# Patient Record
Sex: MALE | Race: BLACK OR AFRICAN AMERICAN | Employment: OTHER | URBAN - METROPOLITAN AREA
[De-identification: names, ages, dates, MRNs, and addresses within clinical notes are randomized per-mention and may not be internally consistent; named-entity substitution may affect disease eponyms.]

---

## 2017-06-13 ENCOUNTER — OFFICE VISIT (OUTPATIENT)
Dept: ENT CLINIC | Age: 57
End: 2017-06-13

## 2017-06-13 VITALS
WEIGHT: 207 LBS | SYSTOLIC BLOOD PRESSURE: 153 MMHG | BODY MASS INDEX: 27.43 KG/M2 | HEART RATE: 66 BPM | DIASTOLIC BLOOD PRESSURE: 86 MMHG | HEIGHT: 73 IN

## 2017-06-13 DIAGNOSIS — J04.0 ACUTE LARYNGITIS: Primary | ICD-10-CM

## 2017-06-13 PROCEDURE — 1036F TOBACCO NON-USER: CPT | Performed by: OTOLARYNGOLOGY

## 2017-06-13 PROCEDURE — 3017F COLORECTAL CA SCREEN DOC REV: CPT | Performed by: OTOLARYNGOLOGY

## 2017-06-13 PROCEDURE — G8419 CALC BMI OUT NRM PARAM NOF/U: HCPCS | Performed by: OTOLARYNGOLOGY

## 2017-06-13 PROCEDURE — 96372 THER/PROPH/DIAG INJ SC/IM: CPT | Performed by: OTOLARYNGOLOGY

## 2017-06-13 PROCEDURE — G8427 DOCREV CUR MEDS BY ELIG CLIN: HCPCS | Performed by: OTOLARYNGOLOGY

## 2017-06-13 PROCEDURE — 99213 OFFICE O/P EST LOW 20 MIN: CPT | Performed by: OTOLARYNGOLOGY

## 2017-06-13 RX ORDER — VENLAFAXINE HYDROCHLORIDE 150 MG/1
CAPSULE, EXTENDED RELEASE ORAL DAILY
COMMUNITY
Start: 2017-04-26

## 2017-06-13 RX ORDER — ACETAMINOPHEN ER 650 MG TABLET,EXTENDED RELEASE 650 MG
650 TABLET, EXTENDED RELEASE ORAL EVERY 8 HOURS PRN
COMMUNITY

## 2017-06-13 RX ORDER — METHYLPREDNISOLONE ACETATE 40 MG/ML
40 INJECTION, SUSPENSION INTRA-ARTICULAR; INTRALESIONAL; INTRAMUSCULAR; SOFT TISSUE ONCE
Status: COMPLETED | OUTPATIENT
Start: 2017-06-13 | End: 2017-06-13

## 2017-06-13 RX ORDER — TOLTERODINE 4 MG/1
CAPSULE, EXTENDED RELEASE ORAL
COMMUNITY
Start: 2017-04-04 | End: 2019-12-12 | Stop reason: ALTCHOICE

## 2017-06-13 RX ORDER — LINACLOTIDE 290 UG/1
CAPSULE, GELATIN COATED ORAL
COMMUNITY
Start: 2017-05-23

## 2017-06-13 RX ADMIN — METHYLPREDNISOLONE ACETATE 40 MG: 40 INJECTION, SUSPENSION INTRA-ARTICULAR; INTRALESIONAL; INTRAMUSCULAR; SOFT TISSUE at 16:07

## 2019-02-20 ENCOUNTER — OFFICE VISIT (OUTPATIENT)
Dept: ENT CLINIC | Age: 59
End: 2019-02-20
Payer: MEDICARE

## 2019-02-20 ENCOUNTER — PROCEDURE VISIT (OUTPATIENT)
Dept: AUDIOLOGY | Age: 59
End: 2019-02-20
Payer: MEDICARE

## 2019-02-20 VITALS
SYSTOLIC BLOOD PRESSURE: 126 MMHG | DIASTOLIC BLOOD PRESSURE: 74 MMHG | HEIGHT: 73 IN | WEIGHT: 197 LBS | BODY MASS INDEX: 26.11 KG/M2

## 2019-02-20 DIAGNOSIS — H90.3 SENSORINEURAL HEARING LOSS (SNHL) OF BOTH EARS: Primary | ICD-10-CM

## 2019-02-20 DIAGNOSIS — H93.13 TINNITUS OF BOTH EARS: Primary | ICD-10-CM

## 2019-02-20 PROCEDURE — G8427 DOCREV CUR MEDS BY ELIG CLIN: HCPCS | Performed by: OTOLARYNGOLOGY

## 2019-02-20 PROCEDURE — 92552 PURE TONE AUDIOMETRY AIR: CPT | Performed by: AUDIOLOGIST

## 2019-02-20 PROCEDURE — 1036F TOBACCO NON-USER: CPT | Performed by: OTOLARYNGOLOGY

## 2019-02-20 PROCEDURE — 92556 SPEECH AUDIOMETRY COMPLETE: CPT | Performed by: AUDIOLOGIST

## 2019-02-20 PROCEDURE — G8419 CALC BMI OUT NRM PARAM NOF/U: HCPCS | Performed by: OTOLARYNGOLOGY

## 2019-02-20 PROCEDURE — 3017F COLORECTAL CA SCREEN DOC REV: CPT | Performed by: OTOLARYNGOLOGY

## 2019-02-20 PROCEDURE — G8484 FLU IMMUNIZE NO ADMIN: HCPCS | Performed by: OTOLARYNGOLOGY

## 2019-02-20 PROCEDURE — 99212 OFFICE O/P EST SF 10 MIN: CPT | Performed by: OTOLARYNGOLOGY

## 2019-02-20 RX ORDER — POTASSIUM CHLORIDE 14.9 MG/ML
20 INJECTION INTRAVENOUS ONCE
COMMUNITY
End: 2019-12-12 | Stop reason: ALTCHOICE

## 2019-02-20 RX ORDER — TRIAMTERENE AND HYDROCHLOROTHIAZIDE 37.5; 25 MG/1; MG/1
1 TABLET ORAL DAILY
COMMUNITY
End: 2019-12-12 | Stop reason: ALTCHOICE

## 2019-02-20 RX ORDER — PIOGLITAZONEHYDROCHLORIDE 15 MG/1
15 TABLET ORAL DAILY
COMMUNITY
End: 2019-12-12 | Stop reason: ALTCHOICE

## 2019-02-20 RX ORDER — PRAVASTATIN SODIUM 20 MG
20 TABLET ORAL DAILY
COMMUNITY

## 2019-12-06 NOTE — PROGRESS NOTES
during my hospitalization, the order may or may not be in effect during this procedure. I give my doctor permission to give me blood or blood products. I understand that there are risks with receiving blood such as hepatitis, AIDS, fever, or allergic reaction. I acknowledge that the risks, benefits, and alternatives of this treatment have been explained to me and that no express or implied warranty has been given by the hospital, any blood bank, or any person or entity as to the blood or blood components transfused. At the discretion of my doctor, I agree to allow observers, equipment/product representatives and allow photographing, and/or televising of the procedure, provided my name or identity is maintained confidentially. I agree the hospital may dispose of or use for scientific or educational purposes any tissue, fluid, or body parts which may be removed.     ________________________________Date________Time______ am/pm  ([facility])  Patient or Signature of Closest Relative or Legal Guardian    ________________________________Date________Time______am/pm      Page 1 of  1  Witness

## 2019-12-12 ENCOUNTER — HOSPITAL ENCOUNTER (OUTPATIENT)
Dept: PREADMISSION TESTING | Age: 59
Discharge: HOME | End: 2019-12-16
Payer: COMMERCIAL

## 2019-12-12 VITALS
OXYGEN SATURATION: 96 % | TEMPERATURE: 97 F | SYSTOLIC BLOOD PRESSURE: 134 MMHG | DIASTOLIC BLOOD PRESSURE: 77 MMHG | WEIGHT: 213 LBS | BODY MASS INDEX: 28.85 KG/M2 | RESPIRATION RATE: 16 BRPM | HEIGHT: 72 IN | HEART RATE: 76 BPM

## 2019-12-12 LAB
ABO/RH: NORMAL
ANION GAP SERPL CALCULATED.3IONS-SCNC: 13 MMOL/L (ref 3–16)
ANTIBODY SCREEN: NORMAL
APTT: 34.3 SEC (ref 24.2–36.2)
BILIRUBIN URINE: NEGATIVE
BLOOD, URINE: ABNORMAL
BUN BLDV-MCNC: 14 MG/DL (ref 7–20)
C-REACTIVE PROTEIN: 7.5 MG/L (ref 0–5.1)
CALCIUM SERPL-MCNC: 9.5 MG/DL (ref 8.3–10.6)
CHLORIDE BLD-SCNC: 101 MMOL/L (ref 99–110)
CLARITY: CLEAR
CO2: 28 MMOL/L (ref 21–32)
COLOR: YELLOW
CREAT SERPL-MCNC: 0.8 MG/DL (ref 0.8–1.3)
EPITHELIAL CELLS, UA: ABNORMAL /HPF
GFR AFRICAN AMERICAN: >60
GFR NON-AFRICAN AMERICAN: >60
GLUCOSE BLD-MCNC: 91 MG/DL (ref 70–99)
GLUCOSE URINE: NEGATIVE MG/DL
HCT VFR BLD CALC: 41.7 % (ref 40.5–52.5)
HEMOGLOBIN: 13.9 G/DL (ref 13.5–17.5)
INR BLD: 1.06 (ref 0.86–1.14)
KETONES, URINE: ABNORMAL MG/DL
LEUKOCYTE ESTERASE, URINE: NEGATIVE
MCH RBC QN AUTO: 31.6 PG (ref 26–34)
MCHC RBC AUTO-ENTMCNC: 33.4 G/DL (ref 31–36)
MCV RBC AUTO: 94.8 FL (ref 80–100)
MICROSCOPIC EXAMINATION: YES
MUCUS: ABNORMAL /LPF
NITRITE, URINE: NEGATIVE
PDW BLD-RTO: 14.6 % (ref 12.4–15.4)
PH UA: 6 (ref 5–8)
PLATELET # BLD: 215 K/UL (ref 135–450)
PMV BLD AUTO: 9.1 FL (ref 5–10.5)
POTASSIUM SERPL-SCNC: 3.6 MMOL/L (ref 3.5–5.1)
PREALBUMIN: 26.8 MG/DL (ref 20–40)
PROTEIN UA: ABNORMAL MG/DL
PROTHROMBIN TIME: 12.3 SEC (ref 10–13.2)
RBC # BLD: 4.4 M/UL (ref 4.2–5.9)
RBC UA: ABNORMAL /HPF (ref 0–2)
SEDIMENTATION RATE, ERYTHROCYTE: 28 MM/HR (ref 0–20)
SODIUM BLD-SCNC: 142 MMOL/L (ref 136–145)
SPECIFIC GRAVITY UA: 1.02 (ref 1–1.03)
URINE TYPE: ABNORMAL
UROBILINOGEN, URINE: 0.2 E.U./DL
WBC # BLD: 6.2 K/UL (ref 4–11)
WBC UA: ABNORMAL /HPF (ref 0–5)

## 2019-12-12 PROCEDURE — 86140 C-REACTIVE PROTEIN: CPT

## 2019-12-12 PROCEDURE — 84134 ASSAY OF PREALBUMIN: CPT

## 2019-12-12 PROCEDURE — 86900 BLOOD TYPING SEROLOGIC ABO: CPT

## 2019-12-12 PROCEDURE — 80048 BASIC METABOLIC PNL TOTAL CA: CPT

## 2019-12-12 PROCEDURE — 85027 COMPLETE CBC AUTOMATED: CPT

## 2019-12-12 PROCEDURE — 86850 RBC ANTIBODY SCREEN: CPT

## 2019-12-12 PROCEDURE — 86901 BLOOD TYPING SEROLOGIC RH(D): CPT

## 2019-12-12 PROCEDURE — 87086 URINE CULTURE/COLONY COUNT: CPT

## 2019-12-12 PROCEDURE — 85652 RBC SED RATE AUTOMATED: CPT

## 2019-12-12 PROCEDURE — 87641 MR-STAPH DNA AMP PROBE: CPT

## 2019-12-12 PROCEDURE — 85730 THROMBOPLASTIN TIME PARTIAL: CPT

## 2019-12-12 PROCEDURE — 81001 URINALYSIS AUTO W/SCOPE: CPT

## 2019-12-12 PROCEDURE — 85610 PROTHROMBIN TIME: CPT

## 2019-12-12 RX ORDER — AMLODIPINE, VALSARTAN AND HYDROCHLOROTHIAZIDE 10; 320; 25 MG/1; MG/1; MG/1
1 TABLET ORAL
COMMUNITY
End: 2019-12-12 | Stop reason: ALTCHOICE

## 2019-12-12 RX ORDER — TAMSULOSIN HYDROCHLORIDE 0.4 MG/1
0.4 CAPSULE ORAL DAILY
COMMUNITY

## 2019-12-12 RX ORDER — OXYCODONE HYDROCHLORIDE AND ACETAMINOPHEN 5; 325 MG/1; MG/1
1 TABLET ORAL EVERY 4 HOURS PRN
COMMUNITY
End: 2020-02-24

## 2019-12-12 RX ORDER — SILDENAFIL CITRATE 20 MG/1
20 TABLET ORAL PRN
COMMUNITY

## 2019-12-12 RX ORDER — AMLODIPINE, VALSARTAN AND HYDROCHLOROTHIAZIDE 10; 320; 25 MG/1; MG/1; MG/1
TABLET ORAL DAILY
COMMUNITY

## 2019-12-13 LAB
MRSA SCREEN RT-PCR: NORMAL
URINE CULTURE, ROUTINE: NORMAL

## 2019-12-16 ENCOUNTER — ANESTHESIA EVENT (OUTPATIENT)
Dept: OPERATING ROOM | Age: 59
DRG: 489 | End: 2019-12-16
Payer: COMMERCIAL

## 2019-12-17 ENCOUNTER — ANESTHESIA (OUTPATIENT)
Dept: OPERATING ROOM | Age: 59
DRG: 489 | End: 2019-12-17
Payer: COMMERCIAL

## 2019-12-17 ENCOUNTER — APPOINTMENT (OUTPATIENT)
Dept: GENERAL RADIOLOGY | Age: 59
DRG: 489 | End: 2019-12-17
Attending: ORTHOPAEDIC SURGERY
Payer: COMMERCIAL

## 2019-12-17 ENCOUNTER — HOSPITAL ENCOUNTER (INPATIENT)
Age: 59
LOS: 2 days | Discharge: HOME | DRG: 489 | End: 2019-12-19
Attending: ORTHOPAEDIC SURGERY | Admitting: ORTHOPAEDIC SURGERY
Payer: COMMERCIAL

## 2019-12-17 VITALS — OXYGEN SATURATION: 100 % | DIASTOLIC BLOOD PRESSURE: 70 MMHG | TEMPERATURE: 96.8 F | SYSTOLIC BLOOD PRESSURE: 130 MMHG

## 2019-12-17 PROBLEM — T84.039A: Status: ACTIVE | Noted: 2019-12-17

## 2019-12-17 LAB
ABO/RH: NORMAL
ANTIBODY SCREEN: NORMAL
GLUCOSE BLD-MCNC: 114 MG/DL (ref 70–99)
GLUCOSE BLD-MCNC: 117 MG/DL (ref 70–99)
GLUCOSE BLD-MCNC: 118 MG/DL (ref 70–99)
GLUCOSE BLD-MCNC: 132 MG/DL (ref 70–99)
GLUCOSE BLD-MCNC: 96 MG/DL (ref 70–99)
PERFORMED ON: ABNORMAL
PERFORMED ON: NORMAL

## 2019-12-17 PROCEDURE — 2580000003 HC RX 258: Performed by: ANESTHESIOLOGY

## 2019-12-17 PROCEDURE — 6360000002 HC RX W HCPCS: Performed by: ANESTHESIOLOGY

## 2019-12-17 PROCEDURE — 2580000003 HC RX 258: Performed by: ORTHOPAEDIC SURGERY

## 2019-12-17 PROCEDURE — 6360000002 HC RX W HCPCS: Performed by: ORTHOPAEDIC SURGERY

## 2019-12-17 PROCEDURE — 2500000003 HC RX 250 WO HCPCS: Performed by: NURSE ANESTHETIST, CERTIFIED REGISTERED

## 2019-12-17 PROCEDURE — 3600000001 HC SURGERY LEVEL 1  BASE: Performed by: ORTHOPAEDIC SURGERY

## 2019-12-17 PROCEDURE — 3700000000 HC ANESTHESIA ATTENDED CARE: Performed by: ORTHOPAEDIC SURGERY

## 2019-12-17 PROCEDURE — 7100000001 HC PACU RECOVERY - ADDTL 15 MIN: Performed by: ORTHOPAEDIC SURGERY

## 2019-12-17 PROCEDURE — 2500000003 HC RX 250 WO HCPCS: Performed by: ORTHOPAEDIC SURGERY

## 2019-12-17 PROCEDURE — 6360000002 HC RX W HCPCS: Performed by: NURSE ANESTHETIST, CERTIFIED REGISTERED

## 2019-12-17 PROCEDURE — 3600000011 HC SURGERY LEVEL 1  ADDTL 15MIN: Performed by: ORTHOPAEDIC SURGERY

## 2019-12-17 PROCEDURE — 87070 CULTURE OTHR SPECIMN AEROBIC: CPT

## 2019-12-17 PROCEDURE — 88305 TISSUE EXAM BY PATHOLOGIST: CPT

## 2019-12-17 PROCEDURE — 88331 PATH CONSLTJ SURG 1 BLK 1SPC: CPT

## 2019-12-17 PROCEDURE — 2720000010 HC SURG SUPPLY STERILE: Performed by: ORTHOPAEDIC SURGERY

## 2019-12-17 PROCEDURE — 3600000006 HC SURGERY LEVEL 6 BASE: Performed by: ORTHOPAEDIC SURGERY

## 2019-12-17 PROCEDURE — 0SBD0ZZ EXCISION OF LEFT KNEE JOINT, OPEN APPROACH: ICD-10-PCS | Performed by: ORTHOPAEDIC SURGERY

## 2019-12-17 PROCEDURE — 6370000000 HC RX 637 (ALT 250 FOR IP): Performed by: ORTHOPAEDIC SURGERY

## 2019-12-17 PROCEDURE — 2709999900 HC NON-CHARGEABLE SUPPLY: Performed by: ORTHOPAEDIC SURGERY

## 2019-12-17 PROCEDURE — 73560 X-RAY EXAM OF KNEE 1 OR 2: CPT

## 2019-12-17 PROCEDURE — 87176 TISSUE HOMOGENIZATION CULTR: CPT

## 2019-12-17 PROCEDURE — 86900 BLOOD TYPING SEROLOGIC ABO: CPT

## 2019-12-17 PROCEDURE — 86850 RBC ANTIBODY SCREEN: CPT

## 2019-12-17 PROCEDURE — 3700000001 HC ADD 15 MINUTES (ANESTHESIA): Performed by: ORTHOPAEDIC SURGERY

## 2019-12-17 PROCEDURE — 0SUW09Z SUPPLEMENT LEFT KNEE JOINT, TIBIAL SURFACE WITH LINER, OPEN APPROACH: ICD-10-PCS | Performed by: ORTHOPAEDIC SURGERY

## 2019-12-17 PROCEDURE — 3600000016 HC SURGERY LEVEL 6 ADDTL 15MIN: Performed by: ORTHOPAEDIC SURGERY

## 2019-12-17 PROCEDURE — 7100000000 HC PACU RECOVERY - FIRST 15 MIN: Performed by: ORTHOPAEDIC SURGERY

## 2019-12-17 PROCEDURE — 2700000000 HC OXYGEN THERAPY PER DAY

## 2019-12-17 PROCEDURE — 3600000015 HC SURGERY LEVEL 5 ADDTL 15MIN: Performed by: ORTHOPAEDIC SURGERY

## 2019-12-17 PROCEDURE — 3600000005 HC SURGERY LEVEL 5 BASE: Performed by: ORTHOPAEDIC SURGERY

## 2019-12-17 PROCEDURE — 1200000000 HC SEMI PRIVATE

## 2019-12-17 PROCEDURE — 0SPD09Z REMOVAL OF LINER FROM LEFT KNEE JOINT, OPEN APPROACH: ICD-10-PCS | Performed by: ORTHOPAEDIC SURGERY

## 2019-12-17 PROCEDURE — C1776 JOINT DEVICE (IMPLANTABLE): HCPCS | Performed by: ORTHOPAEDIC SURGERY

## 2019-12-17 PROCEDURE — 86901 BLOOD TYPING SEROLOGIC RH(D): CPT

## 2019-12-17 PROCEDURE — 87205 SMEAR GRAM STAIN: CPT

## 2019-12-17 RX ORDER — PROMETHAZINE HYDROCHLORIDE 25 MG/ML
6.25 INJECTION, SOLUTION INTRAMUSCULAR; INTRAVENOUS
Status: COMPLETED | OUTPATIENT
Start: 2019-12-17 | End: 2019-12-17

## 2019-12-17 RX ORDER — LIDOCAINE HYDROCHLORIDE 20 MG/ML
INJECTION, SOLUTION INTRAVENOUS PRN
Status: DISCONTINUED | OUTPATIENT
Start: 2019-12-17 | End: 2019-12-17 | Stop reason: SDUPTHER

## 2019-12-17 RX ORDER — MEPERIDINE HYDROCHLORIDE 25 MG/ML
12.5 INJECTION INTRAMUSCULAR; INTRAVENOUS; SUBCUTANEOUS EVERY 5 MIN PRN
Status: DISCONTINUED | OUTPATIENT
Start: 2019-12-17 | End: 2019-12-17 | Stop reason: HOSPADM

## 2019-12-17 RX ORDER — DEXTROSE MONOHYDRATE 25 G/50ML
12.5 INJECTION, SOLUTION INTRAVENOUS PRN
Status: DISCONTINUED | OUTPATIENT
Start: 2019-12-17 | End: 2019-12-19 | Stop reason: HOSPADM

## 2019-12-17 RX ORDER — ROPIVACAINE HYDROCHLORIDE 5 MG/ML
INJECTION, SOLUTION EPIDURAL; INFILTRATION; PERINEURAL
Status: COMPLETED
Start: 2019-12-17 | End: 2019-12-17

## 2019-12-17 RX ORDER — VALSARTAN AND HYDROCHLOROTHIAZIDE 320; 25 MG/1; MG/1
1 TABLET, FILM COATED ORAL DAILY
Status: DISCONTINUED | OUTPATIENT
Start: 2019-12-17 | End: 2019-12-19 | Stop reason: HOSPADM

## 2019-12-17 RX ORDER — VENLAFAXINE HYDROCHLORIDE 150 MG/1
150 CAPSULE, EXTENDED RELEASE ORAL
Status: DISCONTINUED | OUTPATIENT
Start: 2019-12-18 | End: 2019-12-19 | Stop reason: HOSPADM

## 2019-12-17 RX ORDER — MIDAZOLAM HYDROCHLORIDE 1 MG/ML
INJECTION INTRAMUSCULAR; INTRAVENOUS PRN
Status: DISCONTINUED | OUTPATIENT
Start: 2019-12-17 | End: 2019-12-17 | Stop reason: SDUPTHER

## 2019-12-17 RX ORDER — FENTANYL CITRATE 50 UG/ML
INJECTION, SOLUTION INTRAMUSCULAR; INTRAVENOUS
Status: COMPLETED
Start: 2019-12-17 | End: 2019-12-17

## 2019-12-17 RX ORDER — PRAVASTATIN SODIUM 20 MG
20 TABLET ORAL DAILY
Status: DISCONTINUED | OUTPATIENT
Start: 2019-12-17 | End: 2019-12-19 | Stop reason: HOSPADM

## 2019-12-17 RX ORDER — SODIUM CHLORIDE 0.9 % (FLUSH) 0.9 %
10 SYRINGE (ML) INJECTION EVERY 12 HOURS SCHEDULED
Status: DISCONTINUED | OUTPATIENT
Start: 2019-12-17 | End: 2019-12-19 | Stop reason: HOSPADM

## 2019-12-17 RX ORDER — GLYCOPYRROLATE 1 MG/5 ML
SYRINGE (ML) INTRAVENOUS PRN
Status: DISCONTINUED | OUTPATIENT
Start: 2019-12-17 | End: 2019-12-17 | Stop reason: SDUPTHER

## 2019-12-17 RX ORDER — OXYCODONE AND ACETAMINOPHEN 10; 325 MG/1; MG/1
1 TABLET ORAL EVERY 4 HOURS PRN
Qty: 30 TABLET | Refills: 0 | Status: SHIPPED | OUTPATIENT
Start: 2019-12-17 | End: 2019-12-24

## 2019-12-17 RX ORDER — AMLODIPINE BESYLATE 10 MG/1
10 TABLET ORAL DAILY
Status: DISCONTINUED | OUTPATIENT
Start: 2019-12-17 | End: 2019-12-19 | Stop reason: HOSPADM

## 2019-12-17 RX ORDER — SODIUM CHLORIDE 0.9 % (FLUSH) 0.9 %
10 SYRINGE (ML) INJECTION PRN
Status: DISCONTINUED | OUTPATIENT
Start: 2019-12-17 | End: 2019-12-19 | Stop reason: HOSPADM

## 2019-12-17 RX ORDER — NICOTINE POLACRILEX 4 MG
15 LOZENGE BUCCAL PRN
Status: DISCONTINUED | OUTPATIENT
Start: 2019-12-17 | End: 2019-12-19 | Stop reason: HOSPADM

## 2019-12-17 RX ORDER — TAMSULOSIN HYDROCHLORIDE 0.4 MG/1
0.4 CAPSULE ORAL DAILY
Status: DISCONTINUED | OUTPATIENT
Start: 2019-12-17 | End: 2019-12-19 | Stop reason: HOSPADM

## 2019-12-17 RX ORDER — DIPHENHYDRAMINE HYDROCHLORIDE 50 MG/ML
12.5 INJECTION INTRAMUSCULAR; INTRAVENOUS
Status: DISCONTINUED | OUTPATIENT
Start: 2019-12-17 | End: 2019-12-17 | Stop reason: HOSPADM

## 2019-12-17 RX ORDER — BUDESONIDE 0.5 MG/2ML
0.5 INHALANT ORAL 2 TIMES DAILY
Status: DISCONTINUED | OUTPATIENT
Start: 2019-12-17 | End: 2019-12-19 | Stop reason: HOSPADM

## 2019-12-17 RX ORDER — ROCURONIUM BROMIDE 10 MG/ML
INJECTION, SOLUTION INTRAVENOUS PRN
Status: DISCONTINUED | OUTPATIENT
Start: 2019-12-17 | End: 2019-12-17 | Stop reason: SDUPTHER

## 2019-12-17 RX ORDER — ROPIVACAINE HYDROCHLORIDE 5 MG/ML
INJECTION, SOLUTION EPIDURAL; INFILTRATION; PERINEURAL PRN
Status: DISCONTINUED | OUTPATIENT
Start: 2019-12-17 | End: 2019-12-17 | Stop reason: SDUPTHER

## 2019-12-17 RX ORDER — DEXTROSE MONOHYDRATE 50 MG/ML
100 INJECTION, SOLUTION INTRAVENOUS PRN
Status: DISCONTINUED | OUTPATIENT
Start: 2019-12-17 | End: 2019-12-19 | Stop reason: HOSPADM

## 2019-12-17 RX ORDER — OXYCODONE HYDROCHLORIDE 5 MG/1
10 TABLET ORAL EVERY 4 HOURS PRN
Status: DISCONTINUED | OUTPATIENT
Start: 2019-12-17 | End: 2019-12-19 | Stop reason: HOSPADM

## 2019-12-17 RX ORDER — OXYCODONE HYDROCHLORIDE 5 MG/1
5 TABLET ORAL PRN
Status: DISCONTINUED | OUTPATIENT
Start: 2019-12-17 | End: 2019-12-17 | Stop reason: HOSPADM

## 2019-12-17 RX ORDER — CETIRIZINE HYDROCHLORIDE, PSEUDOEPHEDRINE HYDROCHLORIDE 5; 120 MG/1; MG/1
1 TABLET, FILM COATED, EXTENDED RELEASE ORAL DAILY
Status: DISCONTINUED | OUTPATIENT
Start: 2019-12-17 | End: 2019-12-19 | Stop reason: HOSPADM

## 2019-12-17 RX ORDER — TRANEXAMIC ACID 650 1/1
1300 TABLET ORAL ONCE
Status: COMPLETED | OUTPATIENT
Start: 2019-12-17 | End: 2019-12-17

## 2019-12-17 RX ORDER — SODIUM CHLORIDE, SODIUM LACTATE, POTASSIUM CHLORIDE, CALCIUM CHLORIDE 600; 310; 30; 20 MG/100ML; MG/100ML; MG/100ML; MG/100ML
INJECTION, SOLUTION INTRAVENOUS CONTINUOUS
Status: DISCONTINUED | OUTPATIENT
Start: 2019-12-17 | End: 2019-12-19 | Stop reason: HOSPADM

## 2019-12-17 RX ORDER — ACETAMINOPHEN 325 MG/1
650 TABLET ORAL EVERY 6 HOURS
Status: DISCONTINUED | OUTPATIENT
Start: 2019-12-17 | End: 2019-12-19 | Stop reason: HOSPADM

## 2019-12-17 RX ORDER — AMLODIPINE, VALSARTAN AND HYDROCHLOROTHIAZIDE 10; 320; 25 MG/1; MG/1; MG/1
1 TABLET ORAL DAILY
Status: DISCONTINUED | OUTPATIENT
Start: 2019-12-17 | End: 2019-12-17

## 2019-12-17 RX ORDER — NEOSTIGMINE METHYLSULFATE 5 MG/5 ML
SYRINGE (ML) INTRAVENOUS PRN
Status: DISCONTINUED | OUTPATIENT
Start: 2019-12-17 | End: 2019-12-17 | Stop reason: SDUPTHER

## 2019-12-17 RX ORDER — MIDAZOLAM HYDROCHLORIDE 1 MG/ML
INJECTION INTRAMUSCULAR; INTRAVENOUS
Status: COMPLETED
Start: 2019-12-17 | End: 2019-12-17

## 2019-12-17 RX ORDER — SODIUM CHLORIDE, SODIUM LACTATE, POTASSIUM CHLORIDE, CALCIUM CHLORIDE 600; 310; 30; 20 MG/100ML; MG/100ML; MG/100ML; MG/100ML
INJECTION, SOLUTION INTRAVENOUS CONTINUOUS
Status: DISCONTINUED | OUTPATIENT
Start: 2019-12-17 | End: 2019-12-17

## 2019-12-17 RX ORDER — TRANEXAMIC ACID 100 MG/ML
INJECTION, SOLUTION INTRAVENOUS PRN
Status: DISCONTINUED | OUTPATIENT
Start: 2019-12-17 | End: 2019-12-17 | Stop reason: ALTCHOICE

## 2019-12-17 RX ORDER — LORAZEPAM 0.5 MG/1
0.5 TABLET ORAL EVERY 8 HOURS PRN
Status: DISCONTINUED | OUTPATIENT
Start: 2019-12-17 | End: 2019-12-18 | Stop reason: DRUGHIGH

## 2019-12-17 RX ORDER — LABETALOL 20 MG/4 ML (5 MG/ML) INTRAVENOUS SYRINGE
5 EVERY 10 MIN PRN
Status: DISCONTINUED | OUTPATIENT
Start: 2019-12-17 | End: 2019-12-17 | Stop reason: HOSPADM

## 2019-12-17 RX ORDER — OXYCODONE HYDROCHLORIDE 5 MG/1
10 TABLET ORAL PRN
Status: DISCONTINUED | OUTPATIENT
Start: 2019-12-17 | End: 2019-12-17 | Stop reason: HOSPADM

## 2019-12-17 RX ORDER — OXYCODONE HYDROCHLORIDE AND ACETAMINOPHEN 5; 325 MG/1; MG/1
2 TABLET ORAL EVERY 6 HOURS SCHEDULED
Status: DISCONTINUED | OUTPATIENT
Start: 2019-12-17 | End: 2019-12-19 | Stop reason: HOSPADM

## 2019-12-17 RX ORDER — HYDRALAZINE HYDROCHLORIDE 20 MG/ML
5 INJECTION INTRAMUSCULAR; INTRAVENOUS EVERY 10 MIN PRN
Status: DISCONTINUED | OUTPATIENT
Start: 2019-12-17 | End: 2019-12-17 | Stop reason: HOSPADM

## 2019-12-17 RX ORDER — SENNA AND DOCUSATE SODIUM 50; 8.6 MG/1; MG/1
1 TABLET, FILM COATED ORAL 2 TIMES DAILY
Status: DISCONTINUED | OUTPATIENT
Start: 2019-12-17 | End: 2019-12-19 | Stop reason: HOSPADM

## 2019-12-17 RX ORDER — VANCOMYCIN HYDROCHLORIDE 1 G/20ML
INJECTION, POWDER, LYOPHILIZED, FOR SOLUTION INTRAVENOUS PRN
Status: DISCONTINUED | OUTPATIENT
Start: 2019-12-17 | End: 2019-12-17 | Stop reason: ALTCHOICE

## 2019-12-17 RX ORDER — FENTANYL CITRATE 50 UG/ML
INJECTION, SOLUTION INTRAMUSCULAR; INTRAVENOUS PRN
Status: DISCONTINUED | OUTPATIENT
Start: 2019-12-17 | End: 2019-12-17 | Stop reason: SDUPTHER

## 2019-12-17 RX ORDER — OXYCODONE HYDROCHLORIDE 5 MG/1
5 TABLET ORAL EVERY 4 HOURS PRN
Status: DISCONTINUED | OUTPATIENT
Start: 2019-12-17 | End: 2019-12-19 | Stop reason: HOSPADM

## 2019-12-17 RX ORDER — FORMOTEROL FUMARATE 20 UG/2ML
20 SOLUTION RESPIRATORY (INHALATION) 2 TIMES DAILY
Status: DISCONTINUED | OUTPATIENT
Start: 2019-12-17 | End: 2019-12-19 | Stop reason: HOSPADM

## 2019-12-17 RX ORDER — DOCUSATE SODIUM 100 MG/1
100 CAPSULE, LIQUID FILLED ORAL 2 TIMES DAILY
Status: DISCONTINUED | OUTPATIENT
Start: 2019-12-17 | End: 2019-12-19 | Stop reason: HOSPADM

## 2019-12-17 RX ORDER — MORPHINE SULFATE 4 MG/ML
1 INJECTION, SOLUTION INTRAMUSCULAR; INTRAVENOUS EVERY 5 MIN PRN
Status: DISCONTINUED | OUTPATIENT
Start: 2019-12-17 | End: 2019-12-17 | Stop reason: HOSPADM

## 2019-12-17 RX ORDER — ONDANSETRON 2 MG/ML
4 INJECTION INTRAMUSCULAR; INTRAVENOUS EVERY 6 HOURS PRN
Status: DISCONTINUED | OUTPATIENT
Start: 2019-12-17 | End: 2019-12-19 | Stop reason: HOSPADM

## 2019-12-17 RX ORDER — PROPOFOL 10 MG/ML
INJECTION, EMULSION INTRAVENOUS PRN
Status: DISCONTINUED | OUTPATIENT
Start: 2019-12-17 | End: 2019-12-17 | Stop reason: SDUPTHER

## 2019-12-17 RX ORDER — METOCLOPRAMIDE HYDROCHLORIDE 5 MG/ML
10 INJECTION INTRAMUSCULAR; INTRAVENOUS
Status: DISCONTINUED | OUTPATIENT
Start: 2019-12-17 | End: 2019-12-17 | Stop reason: HOSPADM

## 2019-12-17 RX ORDER — GABAPENTIN 300 MG/1
300 CAPSULE ORAL 3 TIMES DAILY
Status: DISCONTINUED | OUTPATIENT
Start: 2019-12-17 | End: 2019-12-19 | Stop reason: HOSPADM

## 2019-12-17 RX ORDER — HYDROMORPHONE HCL 110MG/55ML
PATIENT CONTROLLED ANALGESIA SYRINGE INTRAVENOUS PRN
Status: DISCONTINUED | OUTPATIENT
Start: 2019-12-17 | End: 2019-12-17 | Stop reason: SDUPTHER

## 2019-12-17 RX ADMIN — PROPOFOL 150 MG: 10 INJECTION, EMULSION INTRAVENOUS at 07:46

## 2019-12-17 RX ADMIN — HYDROMORPHONE HYDROCHLORIDE 0.5 MG: 2 INJECTION, SOLUTION INTRAMUSCULAR; INTRAVENOUS; SUBCUTANEOUS at 09:43

## 2019-12-17 RX ADMIN — HYDROMORPHONE HYDROCHLORIDE 0.5 MG: 1 INJECTION, SOLUTION INTRAMUSCULAR; INTRAVENOUS; SUBCUTANEOUS at 11:01

## 2019-12-17 RX ADMIN — SODIUM CHLORIDE, SODIUM LACTATE, POTASSIUM CHLORIDE, AND CALCIUM CHLORIDE: 600; 310; 30; 20 INJECTION, SOLUTION INTRAVENOUS at 09:36

## 2019-12-17 RX ADMIN — PRAVASTATIN SODIUM 20 MG: 20 TABLET ORAL at 14:38

## 2019-12-17 RX ADMIN — OXYCODONE HYDROCHLORIDE AND ACETAMINOPHEN 2 TABLET: 5; 325 TABLET ORAL at 18:44

## 2019-12-17 RX ADMIN — DEXTROSE MONOHYDRATE 2 G: 50 INJECTION, SOLUTION INTRAVENOUS at 23:41

## 2019-12-17 RX ADMIN — TRANEXAMIC ACID 1415 MG: 1 INJECTION, SOLUTION INTRAVENOUS at 07:52

## 2019-12-17 RX ADMIN — LIDOCAINE HYDROCHLORIDE 100 MG: 20 INJECTION, SOLUTION INTRAVENOUS at 07:46

## 2019-12-17 RX ADMIN — TAMSULOSIN HYDROCHLORIDE 0.4 MG: 0.4 CAPSULE ORAL at 14:28

## 2019-12-17 RX ADMIN — DEXTROSE MONOHYDRATE 2 G: 50 INJECTION, SOLUTION INTRAVENOUS at 14:32

## 2019-12-17 RX ADMIN — HYDROMORPHONE HYDROCHLORIDE 0.5 MG: 1 INJECTION, SOLUTION INTRAMUSCULAR; INTRAVENOUS; SUBCUTANEOUS at 10:26

## 2019-12-17 RX ADMIN — SODIUM CHLORIDE, POTASSIUM CHLORIDE, SODIUM LACTATE AND CALCIUM CHLORIDE: 600; 310; 30; 20 INJECTION, SOLUTION INTRAVENOUS at 11:48

## 2019-12-17 RX ADMIN — HYDROMORPHONE HYDROCHLORIDE 0.5 MG: 1 INJECTION, SOLUTION INTRAMUSCULAR; INTRAVENOUS; SUBCUTANEOUS at 10:18

## 2019-12-17 RX ADMIN — HYDROMORPHONE HYDROCHLORIDE 0.5 MG: 1 INJECTION, SOLUTION INTRAMUSCULAR; INTRAVENOUS; SUBCUTANEOUS at 14:11

## 2019-12-17 RX ADMIN — RIVAROXABAN 10 MG: 10 TABLET, FILM COATED ORAL at 20:42

## 2019-12-17 RX ADMIN — HYDROMORPHONE HYDROCHLORIDE 0.5 MG: 1 INJECTION, SOLUTION INTRAMUSCULAR; INTRAVENOUS; SUBCUTANEOUS at 10:53

## 2019-12-17 RX ADMIN — CETIRIZINE HCL, PSEUDOEPHEDRINE HCL 1 TABLET: 5; 120 TABLET, EXTENDED RELEASE ORAL at 14:16

## 2019-12-17 RX ADMIN — ROCURONIUM BROMIDE 50 MG: 10 INJECTION, SOLUTION INTRAVENOUS at 07:46

## 2019-12-17 RX ADMIN — HYDROMORPHONE HYDROCHLORIDE 0.5 MG: 2 INJECTION, SOLUTION INTRAMUSCULAR; INTRAVENOUS; SUBCUTANEOUS at 07:43

## 2019-12-17 RX ADMIN — AMLODIPINE BESYLATE 10 MG: 10 TABLET ORAL at 14:28

## 2019-12-17 RX ADMIN — ROPIVACAINE HYDROCHLORIDE 30 ML: 5 INJECTION, SOLUTION EPIDURAL; INFILTRATION; PERINEURAL at 07:19

## 2019-12-17 RX ADMIN — OXYCODONE HYDROCHLORIDE 10 MG: 5 TABLET ORAL at 15:12

## 2019-12-17 RX ADMIN — PROMETHAZINE HYDROCHLORIDE 6.25 MG: 25 INJECTION INTRAMUSCULAR; INTRAVENOUS at 11:06

## 2019-12-17 RX ADMIN — HYDROMORPHONE HYDROCHLORIDE 0.5 MG: 1 INJECTION, SOLUTION INTRAMUSCULAR; INTRAVENOUS; SUBCUTANEOUS at 11:16

## 2019-12-17 RX ADMIN — ROPIVACAINE HYDROCHLORIDE 30 ML: 5 INJECTION, SOLUTION EPIDURAL; INFILTRATION; PERINEURAL at 07:22

## 2019-12-17 RX ADMIN — LORAZEPAM 0.5 MG: 0.5 TABLET ORAL at 14:28

## 2019-12-17 RX ADMIN — SENNOSIDES AND DOCUSATE SODIUM 1 TABLET: 8.6; 5 TABLET ORAL at 20:42

## 2019-12-17 RX ADMIN — TRANEXAMIC ACID 1300 MG: 650 TABLET ORAL at 14:21

## 2019-12-17 RX ADMIN — PROPOFOL 50 MG: 10 INJECTION, EMULSION INTRAVENOUS at 09:41

## 2019-12-17 RX ADMIN — SODIUM CHLORIDE, SODIUM LACTATE, POTASSIUM CHLORIDE, AND CALCIUM CHLORIDE: 600; 310; 30; 20 INJECTION, SOLUTION INTRAVENOUS at 06:15

## 2019-12-17 RX ADMIN — SENNOSIDES AND DOCUSATE SODIUM 1 TABLET: 8.6; 5 TABLET ORAL at 14:38

## 2019-12-17 RX ADMIN — MIDAZOLAM HYDROCHLORIDE 2 MG: 2 INJECTION, SOLUTION INTRAMUSCULAR; INTRAVENOUS at 07:16

## 2019-12-17 RX ADMIN — GABAPENTIN 300 MG: 300 CAPSULE ORAL at 14:21

## 2019-12-17 RX ADMIN — Medication 1 MG: at 09:17

## 2019-12-17 RX ADMIN — DOCUSATE SODIUM 100 MG: 100 CAPSULE, LIQUID FILLED ORAL at 20:42

## 2019-12-17 RX ADMIN — OXYCODONE HYDROCHLORIDE 10 MG: 5 TABLET ORAL at 22:49

## 2019-12-17 RX ADMIN — FENTANYL CITRATE 100 MCG: 50 INJECTION INTRAMUSCULAR; INTRAVENOUS at 07:16

## 2019-12-17 RX ADMIN — Medication 2 G: at 07:37

## 2019-12-17 RX ADMIN — LINAGLIPTIN 5 MG: 5 TABLET, FILM COATED ORAL at 14:21

## 2019-12-17 RX ADMIN — OXYCODONE HYDROCHLORIDE AND ACETAMINOPHEN 2 TABLET: 5; 325 TABLET ORAL at 14:22

## 2019-12-17 RX ADMIN — Medication 5 MG: at 09:17

## 2019-12-17 RX ADMIN — NALOXEGOL OXALATE 25 MG: 25 TABLET, FILM COATED ORAL at 14:21

## 2019-12-17 RX ADMIN — DOCUSATE SODIUM 100 MG: 100 CAPSULE, LIQUID FILLED ORAL at 14:28

## 2019-12-17 RX ADMIN — SODIUM CHLORIDE, SODIUM LACTATE, POTASSIUM CHLORIDE, AND CALCIUM CHLORIDE: 600; 310; 30; 20 INJECTION, SOLUTION INTRAVENOUS at 06:05

## 2019-12-17 RX ADMIN — OXYCODONE HYDROCHLORIDE 10 MG: 5 TABLET ORAL at 18:43

## 2019-12-17 RX ADMIN — VALSARTAN AND HYDROCHLOROTHIAZIDE 1 TABLET: 320; 25 TABLET, FILM COATED ORAL at 14:28

## 2019-12-17 RX ADMIN — HYDROMORPHONE HYDROCHLORIDE 0.5 MG: 1 INJECTION, SOLUTION INTRAMUSCULAR; INTRAVENOUS; SUBCUTANEOUS at 20:43

## 2019-12-17 RX ADMIN — HYDROMORPHONE HYDROCHLORIDE 0.5 MG: 1 INJECTION, SOLUTION INTRAMUSCULAR; INTRAVENOUS; SUBCUTANEOUS at 17:34

## 2019-12-17 RX ADMIN — HYDROMORPHONE HYDROCHLORIDE 0.5 MG: 1 INJECTION, SOLUTION INTRAMUSCULAR; INTRAVENOUS; SUBCUTANEOUS at 23:42

## 2019-12-17 RX ADMIN — GABAPENTIN 300 MG: 300 CAPSULE ORAL at 20:42

## 2019-12-17 ASSESSMENT — PAIN DESCRIPTION - DIRECTION
RADIATING_TOWARDS: NO

## 2019-12-17 ASSESSMENT — PAIN DESCRIPTION - PAIN TYPE
TYPE: ACUTE PAIN;SURGICAL PAIN
TYPE: ACUTE PAIN;SURGICAL PAIN
TYPE: SURGICAL PAIN
TYPE: SURGICAL PAIN
TYPE: ACUTE PAIN;SURGICAL PAIN
TYPE: SURGICAL PAIN

## 2019-12-17 ASSESSMENT — PULMONARY FUNCTION TESTS
PIF_VALUE: 18
PIF_VALUE: 0
PIF_VALUE: 20
PIF_VALUE: 9
PIF_VALUE: 2
PIF_VALUE: 0
PIF_VALUE: 20
PIF_VALUE: 18
PIF_VALUE: 19
PIF_VALUE: 18
PIF_VALUE: 17
PIF_VALUE: 18
PIF_VALUE: 19
PIF_VALUE: 19
PIF_VALUE: 18
PIF_VALUE: 19
PIF_VALUE: 0
PIF_VALUE: 19
PIF_VALUE: 19
PIF_VALUE: 20
PIF_VALUE: 18
PIF_VALUE: 18
PIF_VALUE: 19
PIF_VALUE: 21
PIF_VALUE: 18
PIF_VALUE: 9
PIF_VALUE: 9
PIF_VALUE: 17
PIF_VALUE: 19
PIF_VALUE: 10
PIF_VALUE: 9
PIF_VALUE: 13
PIF_VALUE: 9
PIF_VALUE: 20
PIF_VALUE: 19
PIF_VALUE: 0
PIF_VALUE: 6
PIF_VALUE: 19
PIF_VALUE: 18
PIF_VALUE: 18
PIF_VALUE: 19
PIF_VALUE: 21
PIF_VALUE: 19
PIF_VALUE: 20
PIF_VALUE: 20
PIF_VALUE: 18
PIF_VALUE: 19
PIF_VALUE: 19
PIF_VALUE: 18
PIF_VALUE: 19
PIF_VALUE: 20
PIF_VALUE: 17
PIF_VALUE: 19
PIF_VALUE: 18
PIF_VALUE: 1
PIF_VALUE: 1
PIF_VALUE: 0
PIF_VALUE: 18
PIF_VALUE: 18
PIF_VALUE: 19
PIF_VALUE: 18
PIF_VALUE: 18
PIF_VALUE: 19
PIF_VALUE: 18
PIF_VALUE: 17
PIF_VALUE: 19
PIF_VALUE: 18
PIF_VALUE: 17
PIF_VALUE: 19
PIF_VALUE: 19
PIF_VALUE: 18
PIF_VALUE: 6
PIF_VALUE: 18
PIF_VALUE: 19
PIF_VALUE: 17
PIF_VALUE: 9
PIF_VALUE: 18
PIF_VALUE: 17
PIF_VALUE: 20
PIF_VALUE: 8
PIF_VALUE: 18
PIF_VALUE: 18
PIF_VALUE: 19
PIF_VALUE: 19
PIF_VALUE: 20
PIF_VALUE: 20
PIF_VALUE: 18
PIF_VALUE: 19
PIF_VALUE: 18
PIF_VALUE: 18
PIF_VALUE: 17
PIF_VALUE: 1
PIF_VALUE: 19
PIF_VALUE: 20
PIF_VALUE: 18
PIF_VALUE: 10
PIF_VALUE: 9
PIF_VALUE: 11
PIF_VALUE: 9
PIF_VALUE: 9
PIF_VALUE: 18
PIF_VALUE: 19
PIF_VALUE: 1
PIF_VALUE: 18
PIF_VALUE: 2
PIF_VALUE: 19
PIF_VALUE: 18
PIF_VALUE: 21
PIF_VALUE: 0
PIF_VALUE: 9
PIF_VALUE: 19
PIF_VALUE: 18
PIF_VALUE: 20
PIF_VALUE: 19
PIF_VALUE: 18
PIF_VALUE: 2
PIF_VALUE: 17
PIF_VALUE: 9
PIF_VALUE: 18
PIF_VALUE: 0
PIF_VALUE: 21
PIF_VALUE: 19
PIF_VALUE: 19
PIF_VALUE: 18
PIF_VALUE: 19
PIF_VALUE: 17

## 2019-12-17 ASSESSMENT — PAIN DESCRIPTION - ONSET
ONSET: ON-GOING

## 2019-12-17 ASSESSMENT — PAIN SCALES - GENERAL
PAINLEVEL_OUTOF10: 8
PAINLEVEL_OUTOF10: 7
PAINLEVEL_OUTOF10: 9
PAINLEVEL_OUTOF10: 7
PAINLEVEL_OUTOF10: 7
PAINLEVEL_OUTOF10: 9
PAINLEVEL_OUTOF10: 7
PAINLEVEL_OUTOF10: 8
PAINLEVEL_OUTOF10: 7
PAINLEVEL_OUTOF10: 7
PAINLEVEL_OUTOF10: 8
PAINLEVEL_OUTOF10: 7
PAINLEVEL_OUTOF10: 7
PAINLEVEL_OUTOF10: 8
PAINLEVEL_OUTOF10: 3
PAINLEVEL_OUTOF10: 8
PAINLEVEL_OUTOF10: 4
PAINLEVEL_OUTOF10: 10
PAINLEVEL_OUTOF10: 9

## 2019-12-17 ASSESSMENT — PAIN DESCRIPTION - ORIENTATION
ORIENTATION: LEFT

## 2019-12-17 ASSESSMENT — PAIN DESCRIPTION - DESCRIPTORS
DESCRIPTORS: SHARP;SORE
DESCRIPTORS: ACHING
DESCRIPTORS: SHARP
DESCRIPTORS: ACHING
DESCRIPTORS: SHARP
DESCRIPTORS: ACHING

## 2019-12-17 ASSESSMENT — PAIN DESCRIPTION - LOCATION
LOCATION: KNEE
LOCATION: NECK
LOCATION: KNEE

## 2019-12-17 ASSESSMENT — PAIN DESCRIPTION - FREQUENCY
FREQUENCY: CONTINUOUS

## 2019-12-17 ASSESSMENT — PAIN DESCRIPTION - PROGRESSION
CLINICAL_PROGRESSION: GRADUALLY WORSENING

## 2019-12-17 ASSESSMENT — PAIN - FUNCTIONAL ASSESSMENT
PAIN_FUNCTIONAL_ASSESSMENT: PREVENTS OR INTERFERES SOME ACTIVE ACTIVITIES AND ADLS
PAIN_FUNCTIONAL_ASSESSMENT: 0-10
PAIN_FUNCTIONAL_ASSESSMENT: PREVENTS OR INTERFERES SOME ACTIVE ACTIVITIES AND ADLS

## 2019-12-17 ASSESSMENT — ENCOUNTER SYMPTOMS
EYES NEGATIVE: 1
RESPIRATORY NEGATIVE: 1
GASTROINTESTINAL NEGATIVE: 1

## 2019-12-17 NOTE — DISCHARGE INSTR - COC
Continuity of Care Form    Patient Name: Alimta Okeefe   :  1960  MRN:  [id number]    Admit date:  2019  Discharge date:  ***    Code Status Order: Prior   Advance Directives:   [facility] Documentation     Date/Time Healthcare Directive Type of Healthcare Directive Copy in [address]  [address] Agent's Name Healthcare Agent's Phone Number    [phone]  No, patient does not have an advance directive for healthcare treatment -- -- -- -- --          Admitting Physician:  Lon Pete MD  PCP: Ashia Newman MD    Discharging Nurse: [facility] Unit/Room#: OR/NONE  Discharging Unit Phone Number: ***    Emergency Contact:   Extended Emergency Contact Information  Primary Emergency Contact: Jordan Hutchison  Address: [address] [place] [address] Phone: [phone]  Mobile Phone: [phone]  Relation: Spouse    Past Surgical History:  Past Surgical History:   Procedure Laterality Date    ANKLE SURGERY Right [id number]    RIGHT ANKLE ARTHROTOMY WITH EXPLORATION, REMOVAL OF LOOSE    CARPAL TUNNEL RELEASE Left     CERVICAL SPINE SURGERY      COLONOSCOPY      FOOT SURGERY      bone spur removed right foot    KNEE ARTHROPLASTY Left     NERVE SURGERY      ulnar, bilat.     ROTATOR CUFF REPAIR Bilateral        Immunization History:   Immunization History   Administered Date(s) Administered    Influenza Virus Vaccine 10/19/2010    Pneumococcal Polysaccharide ([id number]) 2010       Active Problems:  Patient Active Problem List   Diagnosis Code    Hypertension I10    Vomiting R11.10    Abdominal pain R10.9    Abdominal pain, epigastric R10.13    Asthma J45.909    Chronic back pain M54.9, G89.29    GERD (gastroesophageal reflux disease) K21.9    DM2 (diabetes mellitus, type 2) ([facility]) E11.9    Acute renal failure ([facility]) N17.9    Vomiting R11.10    Hearing loss d/t noise H83.3X9 Isolation/Infection:   Isolation          No Isolation        Patient Infection Status     None to display          Nurse Assessment:  Last Vital Signs: BP (!) 140/83   Pulse 65   Temp 97.7 °F (36.5 °C) (Oral)   Resp 17   Ht 6' 1\" (1.854 m)   Wt 206 lb (93.4 kg)   SpO2 97%   BMI 27.18 kg/m²     Last documented pain score (0-10 scale): Pain Level: 3(minimal pain at rest, 7/10 pain with ambulation)  Last Weight:   Wt Readings from Last 1 Encounters:   12/17/19 206 lb (93.4 kg)     Mental Status:  {IP PT MENTAL STATUS:20030}    IV Access:  { CHARLEE IV ACCESS:435873468}    Nursing Mobility/ADLs:  Walking   {P DME ILBW:212601157}  Transfer  {P DME TGPV:908861860}  Bathing  {P DME BIAI:496984745}  Dressing  {P DME WLYT:714298690}  Toileting  {P DME KDDN:408614264}  Feeding  {[facility] DME CJIJ:126837864}  Med Admin  {P DME YTWH:394478432}  Med Delivery   { CHARLEE MED Delivery:015793229}    Wound Care Documentation and Therapy:        Elimination:  Continence:   · Bowel: {YES / IV:95631}  · Bladder: {YES / GH:99635}  Urinary Catheter: {Urinary Catheter:607908987}   Colostomy/Ileostomy/Ileal Conduit: {YES / BE:66999}       Date of Last BM: ***  No intake or output data in the 24 hours ending 12/17/19 0743  No intake/output data recorded.     Safety Concerns:     [employer] Safety Concerns:750921407}    Impairments/Disabilities:      [employer] Impairments/Disabilities:844562668}    Nutrition Therapy:  Current Nutrition Therapy:   [employer] Diet List:466272414}    Routes of Feeding: {[facility] DME Other Feedings:918893986}  Liquids: {Slp liquid thickness:19976}  Daily Fluid Restriction: {CHP DME Yes amt example:222523338}  Last Modified Barium Swallow with Video (Video Swallowing Test): {Done Not Done PTDC:678829499}    Treatments at the Time of Hospital Discharge:   Respiratory Treatments: ***  Oxygen Therapy:  {Therapy; copd oxygen:19711}  Ventilator:    {MH CC Vent REDI:065676588}    Rehab Therapies: {THERAPEUTIC INTERVENTION:7962366398}  Weight Bearing Status/Restrictions: [address] CC Weight Bearin}  Other Medical Equipment (for information only, NOT a DME order):  {EQUIPMENT:374691225}  Other Treatments: ***    Patient's personal belongings (please select all that are sent with patient):  {CHP DME Belongings:792649791}    RN SIGNATURE:  {Esignature:288253537}    CASE MANAGEMENT/SOCIAL WORK SECTION    Inpatient Status Date: ***    Readmission Risk Assessment Score:  Readmission Risk              Risk of Unplanned Readmission:        5           Discharging to Facility/ Agency   · Name:   · Address:  · Phone:  · Fax:    Dialysis Facility (if applicable)   · Name:  · Address:  · Dialysis Schedule:  · Phone:  · Fax:    / signature: {Esignature:123915307}    PHYSICIAN SECTION    Prognosis: Good    Condition at Discharge: Stable    Rehab Potential (if transferring to Rehab): Good    Recommended Labs or Other Treatments After Discharge: Home health referral:    Jamee Blair    Referral for physical therapy, occupational therapy, and most importantly skilled nursing. Initial goals of physical and occupational therapy are to  safety, provide initial mobilization, assess for appropriate aids for mobilization, and to start physical and occupational therapy proportional to the surgery performed. Most importantly monitoring of the wound for any problems is paramount. Please report any issues with the wound. If the postoperative dressing comes off it should be replaced with a Mepilex type dressing and sealed with Tegaderm. As long as the wound is sealed the patient may shower but not bathe. Weightbearing status: WBAT    Precautions or restrictions: see below      Post Operative Total Knee Replacement Instructions     You will be in Rehab once you have your surgery. You may have this Rehab at the hospital or at home with a home health care agency coming to see you. These instructions will apply once you go home.  You may walk for exercise. Inside or Outside and Stairs are okay. Let pain and Home therapy be your guide to how much you should be doing.  Keep wound covered for 10-18 days after surgery. Gauze and Tape should be used on the incision. Cynthia Bang will come out on Post-op day #18. Try to leave postoperative dressing on for 7-10 days (may shower though).  You may shower if the wound is dry and no drainage is present. You can get staples wet. Be sure to dry completely before applying your dressing.  Participate in physical therapy/occupational therapy at home or at an outpatient setting. The goal is to get you 90 degrees flexion by three weeks after surgery. If you do not have this, please call the doctor.  Wear knee brace as directed and sleep with it in 0° extension (straight out). Wear whole DON on the operative leg x 3-4 weeks. You will need to use this until quadriceps are strong. Basically, until you are able to raise your leg by yourself and when stability is restored.  Your knee will feel warm after exercising for several months, this is normal.  Use ice frequently to help this.  Assume your regular diet and avoid constipation. Use laxatives as needed. Magnesium Citrate is the strongest laxative and can be bought over the counter. Senna is only a stool softener. Take Senna as directed daily.  Take pain medication that is provided as needed. (Norco or Percocet) Resume all other home medications.  Your blood clos prevention will be individualized to you. It will require Aspirin Baby low dose 81 mg. two times daily x 6-8 weeks. Other drugs may be added if your risk demands.  Follow-up appointment with Dr. Homero Alejandro 2 weeks after surgery. Please call the office to schedule this appt.  You will feel weak for several weeks after surgery. This is normal due to blood loss during surgery.    Physical therapy is to increase strength, increase motion, and restore ambulation. Your end result depends on your participation in therapy. Work Hard!  Any concerns or questions call [phone] (Medical Department). Call for any emergencies such as increasing leg pain, wound drainage, wound redness/hardness, fever, possible infection, or difficulty voiding.  Blood clots cause leg pain, swelling, shortness of breath, leg edema (swelling that does not go away), and calf tenderness. If this occurs, call the office immediately.  KNEE BRACE - leave lock in extension especially at night and until you can lift the leg on your own. Will adjust as your recover progresses        Physician Certification: I certify the above information and transfer of Amador Brooke  is necessary for the continuing treatment of the diagnosis listed and that he requires [address] for greater 30 days.      Update Admission H&P: No change in H&P    PHYSICIAN SIGNATURE:  Electronically signed by Epifanio Leach MD on 12/17/19 at 7:44 AM

## 2019-12-17 NOTE — PLAN OF CARE
Problem: Mobility - Impaired:  Goal: Achieve maximum mobility level  Description  Achieve maximum mobility level  Note:   Sp block positive Dorsi planter flex, reposition in bed ,

## 2019-12-17 NOTE — H&P
History and Physical update. No significant or new findings.     Allergies   Allergen Reactions    Metformin Other (See Comments)     GI upset    Shellfish-Derived Products Hives    Simvastatin Other (See Comments)     GI upset     Left knee    Glory Elders MD
None     Non-medical: None   Tobacco Use    Smoking status: Former Smoker     Years: 5.00     Last attempt to quit: 1991     Years since quittin.3    Smokeless tobacco: Never Used   Substance and Sexual Activity    Alcohol use: Yes     Comment: occasional    Drug use: Yes     Frequency: 2.0 times per week     Types: Marijuana     Comment: HAS MEDICAL MARIJUANA , used 19    Sexual activity: None   Lifestyle    Physical activity:     Days per week: None     Minutes per session: None    Stress: None   Relationships    Social connections:     Talks on phone: None     Gets together: None     Attends Yazdanism service: None     Active member of club or organization: None     Attends meetings of clubs or organizations: None     Relationship status: None    Intimate partner violence:     Fear of current or ex partner: None     Emotionally abused: None     Physically abused: None     Forced sexual activity: None   Other Topics Concern    None   Social History Narrative    None         Medications Prior to Admission:      Prior to Admission medications    Medication Sig Start Date End Date Taking? Authorizing Provider   mometasone-formoterol (DULERA) 200-5 MCG/ACT inhaler Inhale 2 puffs into the lungs daily   Yes Historical Provider, MD   tamsulosin (FLOMAX) 0.4 MG capsule Take 0.4 mg by mouth daily   Yes Historical Provider, MD   linagliptin (TRADJENTA) 5 MG tablet Take 5 mg by mouth 19  Yes Historical Provider, MD   oxyCODONE-acetaminophen (PERCOCET) 5-325 MG per tablet Take 1 tablet by mouth every 4 hours as needed for Pain.    Yes Historical Provider, MD   pravastatin (PRAVACHOL) 20 MG tablet Take 20 mg by mouth daily   Yes Historical Provider, MD   LINZESS 290 MCG CAPS capsule every morning (before breakfast)  17  Yes Historical Provider, MD   venlafaxine (EFFEXOR XR) 150 MG extended release capsule daily  17  Yes Historical Provider, MD   acetaminophen (TYLENOL 8 HOUR ARTHRITIS

## 2019-12-17 NOTE — PROGRESS NOTES
Left     CERVICAL SPINE SURGERY      COLONOSCOPY      FOOT SURGERY      bone spur removed right foot    KNEE ARTHROPLASTY Left     NERVE SURGERY      ulnar, bilat.  REVISION TOTAL KNEE ARTHROPLASTY Left 12/17/2019    LEFT TOTAL KNEE DEBRIDEMENT AND POLYETHYLENE EXCHANGE performed by Kellen Saldaña MD at [address] [place] [address] Bilateral        Level of Consciousness: Alert, Oriented, and Cooperative = 0    Level of Activity: Walking with assistance = 1    Respiratory Pattern: Regular Pattern; RR 8-20 = 0    Breath Sounds: Clear = 0    Sputum   ,  ,    Cough: Strong, spontaneous, non-productive = 0    Vital Signs   BP (!) 168/85   Pulse 82   Temp 98.5 °F (36.9 °C) (Oral)   Resp 16   Ht 6' 1\" (1.854 m)   Wt 206 lb (93.4 kg)   SpO2 100%   BMI 27.18 kg/m²   SPO2 (COPD values may differ): 90-91% on room air or greater than 92% on FiO2 24- 28% = 1    Peak Flow (asthma only): not applicable = 0    RSI: 0-4 = See once and convert to home regimen or discontinue        Plan       Goals: medication delivery, mobilize retained secretions, volume expansion and improve oxygenation    Patient/caregiver was educated on the proper method of use for Respiratory Care Devices:  Yes      Level of patient/caregiver understanding able to:   ? Verbalize understanding   ? Demonstrate understanding       ? Teach back        ? Needs reinforcement       ? No available caregiver               ? Other:     Response to education:  Very Good     Is patient being placed on Home Treatment Regimen? Yes     Does the patient have everything they need prior to discharge? NA     Comments: reviewed chart and assessed patient     Plan of Care: Continue as ordered     Electronically signed by Giovanni Gomez RCP on 12/17/2019 at 2:21 PM    Respiratory Protocol Guidelines     1.  Assessment and treatment by Respiratory Therapy will be initiated for medication and therapeutic interventions upon initiation of aerosolized medication. 2. Physician will be contacted for respiratory rate (RR) greater than 35 breaths per minute. Therapy will be held for heart rate (HR) greater than 140 beats per minute, pending direction from physician. 3. Bronchodilators will be administered via Metered Dose Inhaler (MDI) with spacer when the following criteria are met:  a. Alert and cooperative     b. HR < 140 bpm  c. RR < 30 bpm                d. Can demonstrate a 2-3 second inspiratory hold  4. Bronchodilators will be administered via Hand Held Nebulizer [facility]) to patients when ANY of the following criteria are met  a. Incognizant or uncooperative          b. Patients treated with HHN at Home        c. Unable to demonstrate proper use of MDI with spacer     d. RR > 30 bpm   5. Bronchodilators will be delivered via Metered Dose Inhaler (MDI), HHN, Aerogen to intubated patients on mechanical ventilation. 6. Inhalation medication orders will be delivered and/or substituted as outlined below. Aerosolized Medications Ordering and Administration Guidelines:    1. All Medications will be ordered by a physician, and their frequency and/or modality will be adjusted as defined by the patients Respiratory Severity Index (RSI) score. 2. If the patient does not have documented COPD, consider discontinuing anticholinergics when RSI is less than 9.  3. If the bronchospasm worsens (increased RSI), then the bronchodilator frequency can be increased to a maximum of every 4 hours. If greater than every 4 hours is required, the physician will be contacted. 4. If the bronchospasm improves, the frequency of the bronchodilator can be decreased, based on the patient's RSI, but not less than home treatment regimen frequency. 5. Bronchodilator(s) will be discontinued if patient has a RSI less than 9 and has received no scheduled or as needed treatment for 72  Hrs. Patients Ordered on a Mucolytic Agent:    1.  Must always be administered with a bronchodilator. 2. Discontinue if patient experiences worsened bronchospasm, or secretions have lessened to the point that the patient is able to clear them with a cough. Anti-inflammatory and Combination Medications:    1. If the patient lacks prior history of lung disease, is not using inhaled anti-inflammatory medication at home, and lacks wheezing by examination or by history for at least 24 hours, contact physician for possible discontinuation.

## 2019-12-17 NOTE — BRIEF OP NOTE
Brief Postoperative Note  ______________________________________________________________    Patient: Lukas Camara  YOB: 1960  MRN: [id number]  Date of Procedure: 12/17/2019    Pre-Op Diagnosis: LEFT TOTAL KNEE INSTABILITY    Post-Op Diagnosis: Same       Procedure(s):  LEFT TOTAL KNEE DEBRIDEMENT AND POLYETHYLENE EXCHANGE WITH VMO ADVANCEMENT    Anesthesia: General / Dali Galindo    Surgeon(s):  Jelena Venegas MD    Assistant: Rudolfo Phalen    Estimated Blood Loss (mL): 782     Complications: None    Specimens:   * No specimens in log *    Implants:  * No implants in log *      Drains: * No LDAs found *    Findings: Same    Trae Baez MD  Date: 12/17/2019  Time: 7:40 AM

## 2019-12-17 NOTE — CONSULTS
12/24/19 Yes Hermilo Desouza MD   rivaroxaban (XARELTO) 10 MG TABS tablet Take 10mg daily at about the same time each day with food x 7 days   THEN: convert to aspirin 81 mg po twice daily x 4-6 weeks 12/17/19  Yes Hermilo Desouza MD   rivaroxaban (XARELTO) 10 MG TABS tablet Take 10mg daily at about the same time each day with food x 10 days 12/17/19  Yes Hermilo Desouza MD   mometasone-formoterol (DULERA) 200-5 MCG/ACT inhaler Inhale 2 puffs into the lungs daily   Yes Historical Provider, MD   tamsulosin (FLOMAX) 0.4 MG capsule Take 0.4 mg by mouth daily   Yes Historical Provider, MD   linagliptin (TRADJENTA) 5 MG tablet Take 5 mg by mouth 9/30/19  Yes Historical Provider, MD   oxyCODONE-acetaminophen (PERCOCET) 5-325 MG per tablet Take 1 tablet by mouth every 4 hours as needed for Pain. Yes Historical Provider, MD   sildenafil (REVATIO) 20 MG tablet Take 20 mg by mouth as needed   Yes Historical Provider, MD   amLODIPine-valsartan-HCTZ -25 MG TABS Take by mouth daily   Yes Historical Provider, MD   pravastatin (PRAVACHOL) 20 MG tablet Take 20 mg by mouth daily   Yes Historical Provider, MD   LINZESS 290 MCG CAPS capsule every morning (before breakfast)  5/23/17  Yes Historical Provider, MD   venlafaxine (EFFEXOR XR) 150 MG extended release capsule daily  4/26/17  Yes Historical Provider, MD   acetaminophen (TYLENOL 8 HOUR ARTHRITIS PAIN) 650 MG extended release tablet Take 650 mg by mouth every 8 hours as needed for Pain    Yes Historical Provider, MD   Loratadine-Pseudoephedrine (CLARITIN-D 24 HOUR PO) Take by mouth   Yes Historical Provider, MD   LORazepam (ATIVAN) 0.5 MG tablet Take 1 tablet by mouth every 8 hours as needed for Anxiety  Patient taking differently: Take 0.5 mg by mouth every 6 hours as needed for Anxiety. 5/27/16  Yes Glory Vogel MD   gabapentin (NEURONTIN) 300 MG capsule Take 300 mg by mouth 3 times daily.     Yes Historical Provider, MD       Allergies:  Metformin;

## 2019-12-17 NOTE — PROGRESS NOTES
Left adductor canal and posterior tibial nerve block completed by Dr. Ish Mccormack with this RN at bedside. Time-out completed prior to block. Heart monitor and 2L NC 02 in place for procedure. See MAR for meds given by Dr. Ish Mccormack. Patient tolerated well.

## 2019-12-18 PROBLEM — T84.033A MECHANICAL LOOSENING OF INTERNAL LEFT KNEE PROSTHETIC JOINT (HCC): Status: ACTIVE | Noted: 2019-12-17

## 2019-12-18 PROBLEM — J44.9 COPD (CHRONIC OBSTRUCTIVE PULMONARY DISEASE) (HCC): Status: ACTIVE | Noted: 2019-12-18

## 2019-12-18 LAB
ANION GAP SERPL CALCULATED.3IONS-SCNC: 12 MMOL/L (ref 3–16)
BUN BLDV-MCNC: 12 MG/DL (ref 7–20)
CALCIUM SERPL-MCNC: 9.4 MG/DL (ref 8.3–10.6)
CHLORIDE BLD-SCNC: 96 MMOL/L (ref 99–110)
CO2: 28 MMOL/L (ref 21–32)
CREAT SERPL-MCNC: 0.8 MG/DL (ref 0.8–1.3)
GFR AFRICAN AMERICAN: >60
GFR NON-AFRICAN AMERICAN: >60
GLUCOSE BLD-MCNC: 100 MG/DL (ref 70–99)
GLUCOSE BLD-MCNC: 101 MG/DL (ref 70–99)
GLUCOSE BLD-MCNC: 115 MG/DL (ref 70–99)
GLUCOSE BLD-MCNC: 121 MG/DL (ref 70–99)
GLUCOSE BLD-MCNC: 122 MG/DL (ref 70–99)
HCT VFR BLD CALC: 38.9 % (ref 40.5–52.5)
HEMOGLOBIN: 13 G/DL (ref 13.5–17.5)
PERFORMED ON: ABNORMAL
POTASSIUM SERPL-SCNC: 3.4 MMOL/L (ref 3.5–5.1)
SODIUM BLD-SCNC: 136 MMOL/L (ref 136–145)

## 2019-12-18 PROCEDURE — 6360000002 HC RX W HCPCS: Performed by: ORTHOPAEDIC SURGERY

## 2019-12-18 PROCEDURE — 1200000000 HC SEMI PRIVATE

## 2019-12-18 PROCEDURE — 6370000000 HC RX 637 (ALT 250 FOR IP): Performed by: INTERNAL MEDICINE

## 2019-12-18 PROCEDURE — 97110 THERAPEUTIC EXERCISES: CPT

## 2019-12-18 PROCEDURE — 36415 COLL VENOUS BLD VENIPUNCTURE: CPT

## 2019-12-18 PROCEDURE — 99221 1ST HOSP IP/OBS SF/LOW 40: CPT | Performed by: INTERNAL MEDICINE

## 2019-12-18 PROCEDURE — 94640 AIRWAY INHALATION TREATMENT: CPT

## 2019-12-18 PROCEDURE — 2580000003 HC RX 258: Performed by: ORTHOPAEDIC SURGERY

## 2019-12-18 PROCEDURE — 85014 HEMATOCRIT: CPT

## 2019-12-18 PROCEDURE — 85018 HEMOGLOBIN: CPT

## 2019-12-18 PROCEDURE — 97116 GAIT TRAINING THERAPY: CPT

## 2019-12-18 PROCEDURE — 6370000000 HC RX 637 (ALT 250 FOR IP): Performed by: ORTHOPAEDIC SURGERY

## 2019-12-18 PROCEDURE — 97162 PT EVAL MOD COMPLEX 30 MIN: CPT

## 2019-12-18 PROCEDURE — 80048 BASIC METABOLIC PNL TOTAL CA: CPT

## 2019-12-18 RX ORDER — LORAZEPAM 0.5 MG/1
0.5 TABLET ORAL EVERY 4 HOURS PRN
Status: DISCONTINUED | OUTPATIENT
Start: 2019-12-18 | End: 2019-12-19 | Stop reason: HOSPADM

## 2019-12-18 RX ORDER — HYDROMORPHONE HYDROCHLORIDE 2 MG/1
1 TABLET ORAL
Status: DISCONTINUED | OUTPATIENT
Start: 2019-12-18 | End: 2019-12-19 | Stop reason: HOSPADM

## 2019-12-18 RX ORDER — POTASSIUM CHLORIDE 20 MEQ/1
20 TABLET, EXTENDED RELEASE ORAL ONCE
Status: COMPLETED | OUTPATIENT
Start: 2019-12-18 | End: 2019-12-18

## 2019-12-18 RX ADMIN — VENLAFAXINE HYDROCHLORIDE 150 MG: 150 CAPSULE, EXTENDED RELEASE ORAL at 09:30

## 2019-12-18 RX ADMIN — VALSARTAN AND HYDROCHLOROTHIAZIDE 1 TABLET: 320; 25 TABLET, FILM COATED ORAL at 09:30

## 2019-12-18 RX ADMIN — BUDESONIDE 500 MCG: 0.5 SUSPENSION RESPIRATORY (INHALATION) at 08:47

## 2019-12-18 RX ADMIN — FORMOTEROL FUMARATE DIHYDRATE 20 MCG: 20 SOLUTION RESPIRATORY (INHALATION) at 08:47

## 2019-12-18 RX ADMIN — Medication 10 ML: at 20:55

## 2019-12-18 RX ADMIN — Medication 10 ML: at 09:32

## 2019-12-18 RX ADMIN — OXYCODONE HYDROCHLORIDE AND ACETAMINOPHEN 2 TABLET: 5; 325 TABLET ORAL at 23:42

## 2019-12-18 RX ADMIN — BUDESONIDE 500 MCG: 0.5 SUSPENSION RESPIRATORY (INHALATION) at 20:04

## 2019-12-18 RX ADMIN — OXYCODONE HYDROCHLORIDE AND ACETAMINOPHEN 2 TABLET: 5; 325 TABLET ORAL at 19:41

## 2019-12-18 RX ADMIN — HYDROMORPHONE HYDROCHLORIDE 0.5 MG: 1 INJECTION, SOLUTION INTRAMUSCULAR; INTRAVENOUS; SUBCUTANEOUS at 04:11

## 2019-12-18 RX ADMIN — CETIRIZINE HCL, PSEUDOEPHEDRINE HCL 1 TABLET: 5; 120 TABLET, EXTENDED RELEASE ORAL at 09:32

## 2019-12-18 RX ADMIN — OXYCODONE HYDROCHLORIDE 10 MG: 5 TABLET ORAL at 03:09

## 2019-12-18 RX ADMIN — AMLODIPINE BESYLATE 10 MG: 10 TABLET ORAL at 09:31

## 2019-12-18 RX ADMIN — HYDROMORPHONE HYDROCHLORIDE 1 MG: 2 TABLET ORAL at 11:33

## 2019-12-18 RX ADMIN — ONDANSETRON 4 MG: 2 INJECTION INTRAMUSCULAR; INTRAVENOUS at 16:03

## 2019-12-18 RX ADMIN — GABAPENTIN 300 MG: 300 CAPSULE ORAL at 09:30

## 2019-12-18 RX ADMIN — LINAGLIPTIN 5 MG: 5 TABLET, FILM COATED ORAL at 09:30

## 2019-12-18 RX ADMIN — RIVAROXABAN 10 MG: 10 TABLET, FILM COATED ORAL at 18:53

## 2019-12-18 RX ADMIN — DOCUSATE SODIUM 100 MG: 100 CAPSULE, LIQUID FILLED ORAL at 09:30

## 2019-12-18 RX ADMIN — POTASSIUM CHLORIDE 20 MEQ: 20 TABLET, EXTENDED RELEASE ORAL at 09:31

## 2019-12-18 RX ADMIN — OXYCODONE HYDROCHLORIDE AND ACETAMINOPHEN 2 TABLET: 5; 325 TABLET ORAL at 13:21

## 2019-12-18 RX ADMIN — LORAZEPAM 0.5 MG: 0.5 TABLET ORAL at 04:14

## 2019-12-18 RX ADMIN — OXYCODONE HYDROCHLORIDE 10 MG: 5 TABLET ORAL at 15:08

## 2019-12-18 RX ADMIN — OXYCODONE HYDROCHLORIDE AND ACETAMINOPHEN 2 TABLET: 5; 325 TABLET ORAL at 06:03

## 2019-12-18 RX ADMIN — SENNOSIDES AND DOCUSATE SODIUM 1 TABLET: 8.6; 5 TABLET ORAL at 20:55

## 2019-12-18 RX ADMIN — OXYCODONE HYDROCHLORIDE 10 MG: 5 TABLET ORAL at 20:55

## 2019-12-18 RX ADMIN — NALOXEGOL OXALATE 25 MG: 25 TABLET, FILM COATED ORAL at 09:30

## 2019-12-18 RX ADMIN — TAMSULOSIN HYDROCHLORIDE 0.4 MG: 0.4 CAPSULE ORAL at 09:30

## 2019-12-18 RX ADMIN — HYDROMORPHONE HYDROCHLORIDE 1 MG: 2 TABLET ORAL at 07:23

## 2019-12-18 RX ADMIN — PRAVASTATIN SODIUM 20 MG: 20 TABLET ORAL at 09:31

## 2019-12-18 RX ADMIN — OXYCODONE HYDROCHLORIDE AND ACETAMINOPHEN 2 TABLET: 5; 325 TABLET ORAL at 00:47

## 2019-12-18 RX ADMIN — GABAPENTIN 300 MG: 300 CAPSULE ORAL at 20:55

## 2019-12-18 RX ADMIN — SENNOSIDES AND DOCUSATE SODIUM 1 TABLET: 8.6; 5 TABLET ORAL at 09:30

## 2019-12-18 RX ADMIN — DOCUSATE SODIUM 100 MG: 100 CAPSULE, LIQUID FILLED ORAL at 20:55

## 2019-12-18 RX ADMIN — FORMOTEROL FUMARATE DIHYDRATE 20 MCG: 20 SOLUTION RESPIRATORY (INHALATION) at 20:05

## 2019-12-18 RX ADMIN — HYDROMORPHONE HYDROCHLORIDE 0.5 MG: 1 INJECTION, SOLUTION INTRAMUSCULAR; INTRAVENOUS; SUBCUTANEOUS at 16:07

## 2019-12-18 RX ADMIN — GABAPENTIN 300 MG: 300 CAPSULE ORAL at 15:07

## 2019-12-18 RX ADMIN — OXYCODONE HYDROCHLORIDE 10 MG: 5 TABLET ORAL at 09:31

## 2019-12-18 ASSESSMENT — PAIN DESCRIPTION - FREQUENCY
FREQUENCY: CONTINUOUS
FREQUENCY: CONTINUOUS
FREQUENCY: INTERMITTENT
FREQUENCY: CONTINUOUS

## 2019-12-18 ASSESSMENT — PAIN DESCRIPTION - ONSET
ONSET: ON-GOING
ONSET: GRADUAL
ONSET: ON-GOING

## 2019-12-18 ASSESSMENT — PAIN DESCRIPTION - DESCRIPTORS
DESCRIPTORS: ACHING
DESCRIPTORS: ACHING
DESCRIPTORS: SHARP
DESCRIPTORS: SHARP
DESCRIPTORS: ACHING
DESCRIPTORS: SHARP
DESCRIPTORS: ACHING
DESCRIPTORS: ACHING

## 2019-12-18 ASSESSMENT — PAIN - FUNCTIONAL ASSESSMENT
PAIN_FUNCTIONAL_ASSESSMENT: ACTIVITIES ARE NOT PREVENTED
PAIN_FUNCTIONAL_ASSESSMENT: PREVENTS OR INTERFERES SOME ACTIVE ACTIVITIES AND ADLS
PAIN_FUNCTIONAL_ASSESSMENT: ACTIVITIES ARE NOT PREVENTED
PAIN_FUNCTIONAL_ASSESSMENT: PREVENTS OR INTERFERES SOME ACTIVE ACTIVITIES AND ADLS

## 2019-12-18 ASSESSMENT — PAIN DESCRIPTION - LOCATION
LOCATION: KNEE

## 2019-12-18 ASSESSMENT — PAIN DESCRIPTION - PROGRESSION
CLINICAL_PROGRESSION: NOT CHANGED
CLINICAL_PROGRESSION: NOT CHANGED
CLINICAL_PROGRESSION: GRADUALLY WORSENING
CLINICAL_PROGRESSION: NOT CHANGED
CLINICAL_PROGRESSION: GRADUALLY WORSENING
CLINICAL_PROGRESSION: GRADUALLY WORSENING
CLINICAL_PROGRESSION: NOT CHANGED
CLINICAL_PROGRESSION: GRADUALLY WORSENING
CLINICAL_PROGRESSION: NOT CHANGED
CLINICAL_PROGRESSION: NOT CHANGED
CLINICAL_PROGRESSION: GRADUALLY WORSENING

## 2019-12-18 ASSESSMENT — PAIN DESCRIPTION - ORIENTATION
ORIENTATION: LEFT

## 2019-12-18 ASSESSMENT — PAIN DESCRIPTION - PAIN TYPE
TYPE: SURGICAL PAIN
TYPE: ACUTE PAIN
TYPE: SURGICAL PAIN
TYPE: ACUTE PAIN

## 2019-12-18 ASSESSMENT — PAIN SCALES - GENERAL
PAINLEVEL_OUTOF10: 8
PAINLEVEL_OUTOF10: 7
PAINLEVEL_OUTOF10: 8
PAINLEVEL_OUTOF10: 4
PAINLEVEL_OUTOF10: 4
PAINLEVEL_OUTOF10: 8
PAINLEVEL_OUTOF10: 7
PAINLEVEL_OUTOF10: 8
PAINLEVEL_OUTOF10: 4
PAINLEVEL_OUTOF10: 8
PAINLEVEL_OUTOF10: 7
PAINLEVEL_OUTOF10: 8
PAINLEVEL_OUTOF10: 7
PAINLEVEL_OUTOF10: 4
PAINLEVEL_OUTOF10: 7
PAINLEVEL_OUTOF10: 8
PAINLEVEL_OUTOF10: 4
PAINLEVEL_OUTOF10: 8

## 2019-12-18 NOTE — PROGRESS NOTES
description reported)  Vital Signs  Patient Currently in Pain: Yes(+L knee pain, no rating and/or description reported)       Orientation  Orientation  Overall Orientation Status: Within Normal Limits     Social/Functional History  Social/Functional History  Lives With: Spouse(+24 hour S/A available for first few weeks)  Type of Home: House  Home Layout: Two level, Bed/Bath upstairs  Home Access: Stairs to enter with rails  Entrance Stairs - Number of Steps: 2 [address] & 13 stairs with hand railing to access second level   Bathroom Shower/Tub: Tub/Shower unit  Bathroom Equipment: Shower chair  Home Equipment: Rolling walker, 4 wheeled walker, Cane, [place] Help From: Family, Friend(s)  ADL Assistance: Independent  Homemaking Assistance: Needs assistance(spouse completes cooking, cleaning, laundry )  Homemaking Responsibilities: No  Ambulation Assistance: Independent(but painful )  Transfer Assistance: Independent(but painful )  Active : Yes  Additional Comments: Pt reports having R rotator cuff repair in 9/2019 & endorses weakness of UE   Cognition   Cognition  Overall Cognitive Status: WNL    Objective     Observation/Palpation  Posture: Fair    AROM RLE (degrees)  RLE AROM: WNL  AROM LLE (degrees)  LLE General AROM: Grossly WFL at all joints except the knee joint--pt lacking ~10\" from the neutral position, +guarding noted & able to achieve ~20* knee flexion while seated in bedside chair. Strength RLE  Strength RLE: WFL  Strength LLE  Strength LLE: WFL  Tone RLE  RLE Tone: Normotonic  Tone LLE  LLE Tone: Normotonic  Motor Control  Gross Motor?: WNL  Coordination  Rapid Alternating Movements: Normal  Sensation  Overall Sensation Status: WNL     Bed mobility  Supine to Sit: Minimal assistance(with HOB slightly elevated. +assistance for LLE management. )    Transfers  Sit to Stand: Contact guard assistance(up to RW. VC for upright posture once in standing position 2/2 pt demo's forward flexed posture. )  Stand to sit: Contact guard assistance    Ambulation  Ambulation?: Yes  WB Status: WBAT   More Ambulation?: No  Ambulation 1  Surface: level tile  Device: Rolling Walker  Assistance: Contact guard assistance  Quality of Gait: Overall, staggering antalgic gait pattern with NBOS, inc'd forward trunk flexion, short B step lengths, dec'd LLE stance time/WB'ing with inc'd reliance of BUE on RW. No overt LOB. Distance: 20' x 1 repetition & 72' x 1 repetition   Comments: VC for upright posture, improved step length/stride lengths of the BLE with emphasis on sequencing pattern with RW--pt demo's +correction. With inc'd ambulation pt demo's a slight improvement in quality/fluidity of gait. Stairs/Curb  Stairs?: No(pt declines concerns with task. )     Balance  Posture: Fair  Sitting - Static: (I at EOB )  Sitting - Dynamic: (I at EOB )  Standing - Static: (S with RW )  Standing - Dynamic: (CGA with RW )     ADDENDUM: 2nd Tx session. Upon PT arrival, pt resting in bed & agreeable to PT tx. Continues to endorse pain, 8/10 which presents as a barrier to his tx. RN notified/aware & to bedside to administer pain medication. Pt participated in LE strengthening exercises. Exercises included: x 10 repetitions BLE ankle DF/PF, x 10 repetitions LLE quad sets with a 5 second hold (minimal palpable contraction noted),x 10 repetitions B gluteal sets with a 5 second hold, 2 sets x 5 repetitions BLE hip abd/add, AA LLE heel slides x 5 repetitions with a 5 second hold & seated AA LLE knee flexion x 5 repetitions with a 5 second hold. Pt remains limited in his L knee ROM 2/2 pain & guarding. Pt participated in short distance gait training in hospital room--ambulating ~20' with CGA & RW. Pt continues to maintain a forward flexed posture with dec'd LLE step length, dec'd LLE stance time with a slow antalgic staggering gait pattern (Knee immobilizer donned on LLE with OOB activity). ++time required for gait.  Upon conclusion of tx

## 2019-12-19 VITALS
HEIGHT: 73 IN | TEMPERATURE: 97.7 F | BODY MASS INDEX: 27.3 KG/M2 | RESPIRATION RATE: 16 BRPM | WEIGHT: 206 LBS | SYSTOLIC BLOOD PRESSURE: 132 MMHG | DIASTOLIC BLOOD PRESSURE: 75 MMHG | HEART RATE: 94 BPM | OXYGEN SATURATION: 94 %

## 2019-12-19 LAB
GLUCOSE BLD-MCNC: 121 MG/DL (ref 70–99)
GLUCOSE BLD-MCNC: 129 MG/DL (ref 70–99)
PERFORMED ON: ABNORMAL
PERFORMED ON: ABNORMAL

## 2019-12-19 PROCEDURE — 97116 GAIT TRAINING THERAPY: CPT

## 2019-12-19 PROCEDURE — 97166 OT EVAL MOD COMPLEX 45 MIN: CPT

## 2019-12-19 PROCEDURE — 6360000002 HC RX W HCPCS: Performed by: ORTHOPAEDIC SURGERY

## 2019-12-19 PROCEDURE — 94770 HC ETCO2 MONITOR DAILY: CPT

## 2019-12-19 PROCEDURE — 97535 SELF CARE MNGMENT TRAINING: CPT

## 2019-12-19 PROCEDURE — 99231 SBSQ HOSP IP/OBS SF/LOW 25: CPT | Performed by: INTERNAL MEDICINE

## 2019-12-19 PROCEDURE — 6370000000 HC RX 637 (ALT 250 FOR IP): Performed by: ORTHOPAEDIC SURGERY

## 2019-12-19 PROCEDURE — 2580000003 HC RX 258: Performed by: ORTHOPAEDIC SURGERY

## 2019-12-19 PROCEDURE — 97530 THERAPEUTIC ACTIVITIES: CPT

## 2019-12-19 PROCEDURE — 94640 AIRWAY INHALATION TREATMENT: CPT

## 2019-12-19 RX ORDER — OXYCODONE HYDROCHLORIDE 10 MG/1
10 TABLET ORAL EVERY 4 HOURS PRN
Qty: 20 TABLET | Refills: 0 | Status: SHIPPED | OUTPATIENT
Start: 2019-12-19 | End: 2019-12-22

## 2019-12-19 RX ADMIN — OXYCODONE HYDROCHLORIDE 10 MG: 5 TABLET ORAL at 14:57

## 2019-12-19 RX ADMIN — NALOXEGOL OXALATE 25 MG: 25 TABLET, FILM COATED ORAL at 09:30

## 2019-12-19 RX ADMIN — CETIRIZINE HCL, PSEUDOEPHEDRINE HCL 1 TABLET: 5; 120 TABLET, EXTENDED RELEASE ORAL at 09:31

## 2019-12-19 RX ADMIN — VENLAFAXINE HYDROCHLORIDE 150 MG: 150 CAPSULE, EXTENDED RELEASE ORAL at 09:27

## 2019-12-19 RX ADMIN — PRAVASTATIN SODIUM 20 MG: 20 TABLET ORAL at 09:27

## 2019-12-19 RX ADMIN — LINAGLIPTIN 5 MG: 5 TABLET, FILM COATED ORAL at 09:30

## 2019-12-19 RX ADMIN — OXYCODONE HYDROCHLORIDE 10 MG: 5 TABLET ORAL at 03:11

## 2019-12-19 RX ADMIN — TAMSULOSIN HYDROCHLORIDE 0.4 MG: 0.4 CAPSULE ORAL at 09:30

## 2019-12-19 RX ADMIN — SENNOSIDES AND DOCUSATE SODIUM 1 TABLET: 8.6; 5 TABLET ORAL at 09:30

## 2019-12-19 RX ADMIN — AMLODIPINE BESYLATE 10 MG: 10 TABLET ORAL at 09:30

## 2019-12-19 RX ADMIN — VALSARTAN AND HYDROCHLOROTHIAZIDE 1 TABLET: 320; 25 TABLET, FILM COATED ORAL at 09:27

## 2019-12-19 RX ADMIN — OXYCODONE HYDROCHLORIDE AND ACETAMINOPHEN 2 TABLET: 5; 325 TABLET ORAL at 12:10

## 2019-12-19 RX ADMIN — FORMOTEROL FUMARATE DIHYDRATE 20 MCG: 20 SOLUTION RESPIRATORY (INHALATION) at 08:15

## 2019-12-19 RX ADMIN — HYDROMORPHONE HYDROCHLORIDE 1 MG: 2 TABLET ORAL at 09:30

## 2019-12-19 RX ADMIN — GABAPENTIN 300 MG: 300 CAPSULE ORAL at 09:30

## 2019-12-19 RX ADMIN — BUDESONIDE 500 MCG: 0.5 SUSPENSION RESPIRATORY (INHALATION) at 08:15

## 2019-12-19 RX ADMIN — Medication 10 ML: at 09:32

## 2019-12-19 RX ADMIN — GABAPENTIN 300 MG: 300 CAPSULE ORAL at 14:57

## 2019-12-19 RX ADMIN — OXYCODONE HYDROCHLORIDE AND ACETAMINOPHEN 2 TABLET: 5; 325 TABLET ORAL at 06:22

## 2019-12-19 RX ADMIN — DOCUSATE SODIUM 100 MG: 100 CAPSULE, LIQUID FILLED ORAL at 09:29

## 2019-12-19 ASSESSMENT — PAIN DESCRIPTION - PAIN TYPE
TYPE: ACUTE PAIN
TYPE: SURGICAL PAIN
TYPE: SURGICAL PAIN
TYPE: ACUTE PAIN

## 2019-12-19 ASSESSMENT — PAIN SCALES - GENERAL
PAINLEVEL_OUTOF10: 8
PAINLEVEL_OUTOF10: 8
PAINLEVEL_OUTOF10: 7
PAINLEVEL_OUTOF10: 3
PAINLEVEL_OUTOF10: 7
PAINLEVEL_OUTOF10: 7
PAINLEVEL_OUTOF10: 3

## 2019-12-19 ASSESSMENT — PAIN DESCRIPTION - PROGRESSION
CLINICAL_PROGRESSION: NOT CHANGED

## 2019-12-19 ASSESSMENT — PAIN DESCRIPTION - LOCATION
LOCATION: KNEE

## 2019-12-19 ASSESSMENT — PAIN DESCRIPTION - FREQUENCY
FREQUENCY: CONTINUOUS

## 2019-12-19 ASSESSMENT — PAIN DESCRIPTION - DESCRIPTORS
DESCRIPTORS: ACHING
DESCRIPTORS: ACHING;CONSTANT
DESCRIPTORS: ACHING;CONSTANT
DESCRIPTORS: ACHING

## 2019-12-19 ASSESSMENT — PAIN - FUNCTIONAL ASSESSMENT
PAIN_FUNCTIONAL_ASSESSMENT: ACTIVITIES ARE NOT PREVENTED
PAIN_FUNCTIONAL_ASSESSMENT: ACTIVITIES ARE NOT PREVENTED

## 2019-12-19 ASSESSMENT — PAIN DESCRIPTION - ORIENTATION
ORIENTATION: LEFT

## 2019-12-19 ASSESSMENT — PAIN DESCRIPTION - ONSET
ONSET: ON-GOING

## 2019-12-19 NOTE — DISCHARGE SUMMARY
POD# 2  S:  Pain in surgical area about  6 /10  No new pain or N/T/LOS    O:  AVSS  Vitals:    12/19/19 1101   BP: 132/81   Pulse: 90   Resp: 16   Temp: 97.2 °F (36.2 °C)   SpO2: 93%       Wound stable and dry without infection. Hgb and labs stable. Oh negative bilaterally  Neuro- intact    Hematology - cultures no growth so far    A: S/P  LTKR revision    P: Continue current treatment plan. PT and OT  Discharge to home today  Stable and cleared by medicine and PT/OT.       Additional --  No HHC will go directly to Mari Melendrez MD
stable at discharge. Follow-up is arranged with Dr. Steffany Vicente for clinical and radiographic review in the next two to three weeks. Written instructions, prescriptions, and directions were provided in detail. Thromboembolic prevention was provided with mechanical and pharmacologic means as necessary, and the patient will continue the pharmacologist prophylaxis as an outpatient as directed by Dr. Steffany Vicente. The patients wound will be addressed by a patient and family and sterile wound care will continue for about 18 days and/or until the staples are removed. The patient is discharged in a stable and satisfactory condition and is discharged to home    Cultures are no growth at present    Allergies   Allergen Reactions    Metformin Other (See Comments)     GI upset    Shellfish-Derived Products Hives    Simvastatin Other (See Comments)     GI upset       Pain Management: This patient is recovering from a major orthopedic injury/surgery (revision LTKR). In my experience (over 30 years) this type of surgery will require narcotic analgesics will likely be required for more than 7 days. It is medically necessary to exceed an average daily dose of 30 MED to provide adequate analgesia to ensure patient comfort and successful rehabilitation. The patient was advised to use the minimum possible dose to adequately control pain and facilitate rehabilitation. We will attempt to discontinue all narcotic analgesics as soon as possible. The risks and benefits of narcotic addiction, substance abuse and all potential side effects were all carefully discussed. The patient voiced understanding, consent, and agreement.    __________________________________  Mike Vicente M.D., M.B. A.

## 2019-12-19 NOTE — PROGRESS NOTES
Occupational Therapy   Occupational Therapy Initial Assessment/ Treatment/ discharge  Date: 2019   Patient Name: Gabbie Adjutant  MRN: [id number]     : 1960    Date of Service: 2019    Discharge Recommendations:  S Level 1  OT Equipment Recommendations  Equipment Needed: Lauren Maguire scored a 21/24 on the AM-PAC ADL Inpatient form. Current research shows that an AM-PAC score of 18 or greater is typically associated with a discharge to the patient's home setting. Based on the patients AM-PAC score and their current ADL deficits, it is recommended that the patient have 2-3 sessions per week of Occupational Therapy at d/c to increase the patients independence. Assessment   Performance deficits / Impairments: Decreased functional mobility ; Decreased endurance;Decreased coordination;Decreased high-level IADLs  Assessment: Prior to admission pt was living at home with his wife, independent for ADLs and wife was completing IADLs. PT now presents s/p L knee revision, now near his baseline however. Pt requires supervision for transfers and ADLs. Pt plans to dc home with wife to assist, [facility] score also indicates homebound discharge. Pt demonstrates no acute OT needs at this time, will sign off on acute OT services. Treatment Diagnosis: decreased ADLs and transfers secondary to L knee sx  Prognosis: Good  Decision Making: Medium Complexity  OT Education: OT Role;Plan of Care  Patient Education: verb understanding  REQUIRES OT FOLLOW UP: No  Activity Tolerance  Activity Tolerance: Patient Tolerated treatment well  Safety Devices  Safety Devices in place: Yes  Type of devices: Left in chair;Call light within reach; Chair alarm in place;Nurse notified           Patient Diagnosis(es): The encounter diagnosis was Mechanical loosening of internal left knee prosthetic joint, initial encounter ([facility] [address]      has a past medical history of Acute renal failure ([facility] [address] Ankle disorder, Arthritis, Asthma, asthma, COPD (chronic obstructive pulmonary disease) ([facility] [address] Diabetes mellitus ([facility] [address] GERD (gastroesophageal reflux disease), GERD (gastroesophageal reflux disease), Heart murmur, History of gastritis, Hypertension, Pancreatitis, peripheral neuropathy, Spinal stenosis, spinal stenosis, and Ulcer. has a past surgical history that includes Rotator cuff repair (Bilateral); Cervical spine surgery; Carpal tunnel release (Left); Nerve Surgery; Colonoscopy; Ankle surgery (Right, [id number]); Foot surgery; Knee Arthroplasty (Left); and Revision total knee arthroplasty (Left, 12/17/2019). Treatment Diagnosis: decreased ADLs and transfers secondary to L knee sx      Restrictions  Position Activity Restriction  Other position/activity restrictions: WBAT L , up with assist; KI to L LE    Subjective   General  Chart Reviewed: Yes  Patient assessed for rehabilitation services?: Yes  Additional Pertinent Hx: Pt admitted for LEFT TOTAL KNEE DEBRIDEMENT AND POLYETHYLENE EXCHANGE WITH VMO ADVANCEMENT on 12/17. PMHx includes:  has a past medical history of Acute renal failure ([facility] [address] (9/28/2012), Ankle disorder (7/2013), Arthritis, Asthma, asthma, COPD (chronic obstructive pulmonary disease) ([facility] [address] Diabetes mellitus ([facility] [address] (8/19/2011), GERD (gastroesophageal reflux disease), GERD (gastroesophageal reflux disease), Heart murmur, History of gastritis, Hypertension, Pancreatitis, peripheral neuropathy, Spinal stenosis, spinal stenosis, and Ulcer. Family / Caregiver Present: No  Referring Practitioner: Daysi,   Diagnosis: mechanical loosening of prosthetic joint  Subjective  Subjective: Pt walking in hallway with physical therapy, agreeable to OT eval and treat. PT reported having bathed and dressed this am already. Pt denied pain.     Social/Functional History  Social/Functional History  Lives With: Spouse(+24 hour S/A available for first few weeks)  Type of Home: House  Home Layout: Two level, Bed/Bath upstairs  Home Access: Stairs to enter with rails  Entrance Stairs - Number of Steps: 2 [address] 13 stairs with hand railing to access second level   Bathroom Shower/Tub: Tub/Shower unit  Bathroom Equipment: Shower chair  Home Equipment: Rolling walker, 4 wheeled walker, Cane, [employer]  Receives Help From: Family, Friend(s)  ADL Assistance: Independent  Homemaking Assistance: Needs assistance(spouse completes cooking, cleaning, laundry )  Homemaking Responsibilities: No  Ambulation Assistance: Independent(but painful )  Transfer Assistance: Independent(but painful )  Active : Yes  Additional Comments: Pt reports having R rotator cuff repair in 9/2019 & endorses weakness of UE        Objective   Vision: Within Functional Limits  Hearing: Within functional limits    Orientation  Overall Orientation Status: Within Functional Limits     Balance  Sitting Balance: Independent  Standing Balance: Supervision  Standing Balance  Time: 5+5 mins  Activity: funcitonal mobility in hallway, in room to/from bathroom  Comment: with RW  Functional Mobility  Functional - Mobility Device: Rolling Walker  Activity: To/from bathroom; Other(in hallway)  Assist Level: Supervision  Toilet Transfers  Toilet - Technique: Ambulating  Equipment Used: Standard toilet(+GB; pt reports having sink and wall on each side of toilet at home, toilet at home is comfort height)  Toilet Transfer: Supervision  Toilet Transfers Comments: effortful 2/2 low height and pt is tall, however pt was able to complete with no physical assistance  ADL  Feeding: Setup; Beverage management  Grooming: Supervision(oral care standing at sink)  Toileting: Supervision(simulated)  Additional Comments: declined further ADLs           Transfers  Sit to stand: Supervision  Stand to sit: Supervision     Cognition  Overall Cognitive Status: WNL                 LUE AROM (degrees)  LUE AROM : WFL  Left Hand AROM (degrees)  Left Hand AROM: WFL  RUE AROM (degrees)  RUE AROM : WFL  Right Hand AROM (degrees)  Right Hand AROM: WFL  LUE Strength  Gross LUE Strength: WFL  RUE Strength  Gross RUE Strength: WFL           Treatment included functional transfer training, ADLs, and patient education. Plan   Plan  Times per week: dc acute ot              Therapy Time   Individual Concurrent Group Co-treatment   Time In 1026         Time Out 1049         Minutes 23         Timed Code Treatment Minutes: 8 Minutes(+15 min eval)    Nohelia ECHAVARRIA  [address] [place] [address], OTR/L [id number]

## 2019-12-19 NOTE — PROGRESS NOTES
Pancreatitis, peripheral neuropathy, Spinal stenosis, spinal stenosis, and Ulcer. has a past surgical history that includes Rotator cuff repair (Bilateral); Cervical spine surgery; Carpal tunnel release (Left); Nerve Surgery; Colonoscopy; Ankle surgery (Right, [id number]); Foot surgery; Knee Arthroplasty (Left); and Revision total knee arthroplasty (Left, 12/17/2019). Restrictions  Position Activity Restriction  Other position/activity restrictions: WBAT L , up with assist; KI to L LE  Subjective   General  Chart Reviewed: Yes  Additional Pertinent Hx: 61 yo admitted 12/17/19 for L TKR revision (liner exchange and debridement) per Dr. Mallory Baez. Pmhx: L TKR, B rotator cuff repair. Family / Caregiver Present: No  Referring Practitioner: Yoanna Pulido MD  Subjective  Subjective: Pt up in chair reports just receiving hinged knee brace. \"The man said to leave in extension & he doesn't want any bending right now. \"  RN clarified that the man fitting the brace was from Dr. Barry Pay office and did say that. No orders in chart for ROM restrictions but pt under assumption he should do no bending until seen by Dr. Mallory Baez next week.    Pain Screening  Patient Currently in Pain: Yes(4 L knee)  Vital Signs  Patient Currently in Pain: Yes(4 L knee)       Orientation  Orientation  Overall Orientation Status: Within Normal Limits     Objective   Bed mobility  Supine to Sit: Independent(HOB flat)  Sit to Supine: Independent(HOB flat)  Transfers  Sit to Stand: Supervision  Stand to sit: Supervision  Ambulation  Ambulation?: Yes  WB Status: WBAT   Ambulation 1  Surface: level tile  Device: Rolling Walker  Assistance: Supervision  Quality of Gait: initially slow gait first few steps & slow to advance LLE; gradually developed into a more normal walking pattern; few standing rest breaks due to reported RUE fatigue from recent shoulder surgery  Distance: 160'  Stairs/Curb  Stairs?: Yes(up/down 11 steps with 1rail/1crutch

## 2019-12-19 NOTE — PROGRESS NOTES
Patient alert and oriented x4. VSS. Surgical site is CDI. Neuro checks WNL. Pain controlled with PO pain medicine. Urinating adequately. Tolerating ambulation well. Fall precautions in place, will continue to monitor.

## 2019-12-19 NOTE — PLAN OF CARE
Problem: Pain - Acute:  Goal: Pain level will decrease  Description  Pain level will decrease. Patient's pain well managed with oral medications this shift, no IV medications required. 12/19/2019 1058 by Saleem Martinez RN  Outcome: Ongoing     Problem: Falls - Risk of:  Goal: Will remain free from falls  Description   Patient in bed with alarm and nonskid socks on, 2/4 side rails up and bed locked in lowest position. Call light, belongings, and bedside table within reach. Patient educated on using call light and instructed to call out for assistance when getting out of bed, patient verbalized understanding. Patient calls out approprietly and remains free of falls.     12/19/2019 1058 by Saleem Martinez RN  Outcome: Ongoing

## 2020-01-06 LAB
ANAEROBIC CULTURE: NORMAL
BODY FLUID CULTURE, STERILE: NORMAL
GRAM STAIN RESULT: NORMAL
GRAM STAIN RESULT: NORMAL
WOUND/ABSCESS: NORMAL

## 2020-02-24 ENCOUNTER — ANESTHESIA EVENT (OUTPATIENT)
Dept: OPERATING ROOM | Age: 60
DRG: 487 | End: 2020-02-24
Payer: COMMERCIAL

## 2020-02-24 RX ORDER — OXYCODONE AND ACETAMINOPHEN 10; 325 MG/1; MG/1
1 TABLET ORAL EVERY 4 HOURS PRN
COMMUNITY

## 2020-02-24 NOTE — PROGRESS NOTES
Dr. Armenta Client called LUCIEN Akbar, RN with order for PICC placement preop 2/25. Orders placed for PICC and required labs prior to placement.   [facility] for PICC team with patient information and need/ag

## 2020-02-24 NOTE — PROGRESS NOTES
[facility] [employer] / [facility] ([facility]) [address] [facility], [address]    Acknowledgment of Informed Consent for Surgical or Medical Procedure and Sedation  I agree to allow doctor(s) Christophe Linder and his/her associates or assistants, including residents and/or other qualified medical practitioner to perform the following medical treatment or procedure and to administer or direct the administration of sedation as necessary:  Procedure(s): INCISION AND DRAINAGE, CULTURE AND CLOSURE LEFT TOTAL KNEE ARTHROPLASTY WOUND, POSSIBLE POLY EXCHANGE   My doctor has explained the following regarding the proposed procedure:   the explanation of the procedure   the benefits of the procedure   the potential problems that might occur during recuperation   the risks and side effects of the procedure which could include but are not limited to severe blood loss, infection, stroke or death   the benefits, risks and side effect of alternative procedures including the consequences of declining this procedure or any alternative procedures   the likelihood of achieving satisfactory results. I acknowledge no guarantee or assurance has been made to me regarding the results. I understand that during the course of this treatment/procedure, unforeseen conditions can occur which require an additional or different procedure. I agree to allow my physician or assistants to perform such extension of the original procedure as they may find necessary. I understand that sedation will often result in temporary impairment of memory and fine motor skills and that sedation can occasionally progress to a state of deep sedation or general anesthesia. I understand the risks of anesthesia for surgery include, but are not limited to, sore throat, hoarseness, injury to face, mouth, or teeth; nausea; headache; injury to blood vessels or nerves; death, brain damage, or paralysis.     I understand that if I have a Limitation of Treatment order in effect during my hospitalization, the order may or may not be in effect during this procedure. I give my doctor permission to give me blood or blood products. I understand that there are risks with receiving blood such as hepatitis, AIDS, fever, or allergic reaction. I acknowledge that the risks, benefits, and alternatives of this treatment have been explained to me and that no express or implied warranty has been given by the hospital, any blood bank, or any person or entity as to the blood or blood components transfused. At the discretion of my doctor, I agree to allow observers, equipment/product representatives and allow photographing, and/or televising of the procedure, provided my name or identity is maintained confidentially. I agree the hospital may dispose of or use for scientific or educational purposes any tissue, fluid, or body parts which may be removed.     ________________________________Date________Time______ am/pm  ([facility])  Patient or Signature of Closest Relative or Legal Guardian    ________________________________Date________Time______am/pm      Page 1 of  1  Witness

## 2020-02-24 NOTE — PROGRESS NOTES
Snoring? Do you snore loudly (loud enough to be heard through closed doors, or your bed partner elbows you for snoring at night)? No    Tired? Do you often feel tired, fatigued, or sleepy during the daytime (such as falling asleep during driving)? No    Observed? Has anyone observed you stop breathing or choking/gasping during your sleep? No    Pressure? Do you have or are being treated for high blood pressure? Yes    Neck Size? (measured around Sandros apple)  For male, is your shirt collar 17 inches or larger? For female, is your shirt collar 16 inches or larger? Yes    Age older than 48years old? Yes    Gender = Male  Yes    Body Mass Index more than 35 kg/m2? No    Risk of ISATU Scoring criteria:    [] Low risk:  Yes to 0 - 2 questions    [x] Intermediate risk:  Yes to 3 - 4 questions    [] High risk:  Yes to 5 - 8 questions         For ALL PATIENTS THAT SCORE  5 or >:    Results called to OR Scheduling? No    Patient given Sleep Apnea Referral Pamphlet?   No

## 2020-02-24 NOTE — PROGRESS NOTES
[facility] PRE-SURGICAL TESTING INSTRUCTIONS                              PRIOR TO PROCEDURE DATE:  1. Please follow any guidelines/instructions prior to your procedure as advised by your surgeon. 2. Arrange for someone to drive you home and be with you for the first 24 hours after discharge for your safety after your procedure for which you received sedation. Ensure it is someone we can share information with regarding your discharge. 3. You must contact your surgeon for instructions IF:   You are taking any blood thinners, aspirin, anti-inflammatory or vitamin E.   There is a change in your physical condition such as a cold, fever, rash, cuts, sores or any other infection, especially near your surgical site. 4. Do not drink alcohol the day before or day of your procedure. 5. A Pre-op History and Physical for surgery MUST be completed by your Physician or Urgent Care within 30 days of your procedure date. Please bring a copy with you on the day of your procedure and along with any other testing performed. THE DAY OF YOUR PROCEDURE:  1. Follow instructions for ARRIVAL TIME as DIRECTED BY YOUR SURGEON. I    2. Enter the MAIN entrance from [facility] and follow the signs to the free [facility] parking (offered free of charge 6am-5pm). 3. Enter the Main Entrance of the hospital (do not enter from the lower level of the parking garage). Upon entrance, check in with the  at the main desk on your left. If no one is available at the desk, proceed into the [facility] Waiting Room and go through the door directly into the [facility]. There is a Check-in desk ACROSS from Room 5 (marked with a sign hanging from the ceiling). The phone number for the surgery center is [phone]. 4. Please call [phone] option #2 option #2 if you have not been preregistered yet. On the day of your procedure bring your insurance card and photo ID.  You will be registered at your bedside once brought back to your room. 5. DO NOT EAT ANYTHING eight hours prior to surgery. May have 8 ounces of water 4 hours prior to surgery. 6. MEDICATIONS    Take the following medications with a SMALL sip of water: flomax, effexor, gabapentin   Use your usual dose of inhalers the morning of surgery. BRING your rescue inhaler with you to hospital.    Anesthesia does NOT want you to take insulin the morning of surgery. They will control your blood sugar while you are at the hospital. Please contact your ordering physician for instructions regarding your insulin the night before your procedure. If you have an insulin pump, please keep it set on basal rate. 7. Do not swallow water when brushing teeth. No gum, candy, mints or ice chips. Refrain from smoking or at least decrease the amount. 8. Dress in loose, comfortable clothing appropriate for redressing after your procedure. Do not wear jewelry (including body piercings), make-up (especially NO eye make-up), fingernail polish (NO toenail polish if foot/leg surgery), lotion, powders or metal hairclips. 9. Dentures, glasses, or contacts will need to be removed before your procedure. Bring cases for your glasses, contacts, dentures, or hearing aids to protect them while you are in surgery. 10. If you use a CPAP, please bring it with you on the day of your procedure. 11. We recommend that valuable personal  belongings such as cash, cell phones, e-tablets or jewelry, be left at home during your stay. The hospital will not be responsible for valuables that are not secured in the hospital safe. However, if your insurance requires a co-pay, you may want to bring a method of payment, i.e. Check or credit card, if you wish to pay your co-pay the day of surgery. 12. If you are to stay overnight, you may bring a bag with personal items.  Please have any large items you may need brought in by your family after your arrival to your hospital room. 15. If you have a Living Will or Durable Power of , please bring a copy on the day of your procedure. [facility]. With your permission, one family member may accompany you while you are being prepared for surgery. Once you are ready, additional family members may join you. HOW WE KEEP YOU SAFE and WORK TO PREVENT SURGICAL SITE INFECTIONS:  1. Health care workers should always check your ID bracelet to verify your name and birth date. You will be asked many times to state your name, date of birth, and allergies. 2. Health care workers should always clean their hands with soap or alcohol gel before providing care to you. It is okay to ask anyone if they cleaned their hands before they touch you. 3. You will be actively involved in verifying the type of procedure you are having and ensuring the correct surgical site. This will be confirmed multiple times prior to your procedure. Do NOT stephanie your surgery site UNLESS instructed to by your surgeon. 4. Do not shave or wax for 72 hours prior to procedure near your operative site. Shaving with a razor can irritate your skin and make it easier to develop an infection. On the day of your procedure, any hair that needs to be removed near the surgical site will be clipped by a healthcare worker using a special clippers designed to avoid skin irritation. 5. When you are in the operating room, your surgical site will be cleansed with a special soap, and in most cases, you will be given an antibiotic before the surgery begins. What to expect AFTER YOUR PROCEDURE:  1. Immediately following your procedure, your will be taken to the PACU for the first phase of your recovery. Your nurse will help you recover from any potential side effects of anesthesia, such as extreme drowsiness, changes in your vital signs or breathing patterns. Nausea, headache, muscle aches, or sore throat may also occur after anesthesia.   Your nurse will help you manage these potential side effects. 2. For comfort and safety, arrange to have someone at home with you for the first 24 hours after discharge. 3. You and your family will be given written instructions about your diet, activity, dressing care, medications, and return visits. 4. Once at home, should issues with nausea, pain, or bleeding occur, or should you notice any signs of infection, you should call your surgeon. 5. Always clean your hands before and after caring for your wound. Do not let your family touch your surgery site without cleaning their hands. 6. Narcotic pain medications can cause significant constipation. You may want to add a stool softener to your postoperative medication schedule or speak to your surgeon on how best to manage this SIDE EFFECT. SPECIAL INSTRUCTIONS     Thank you for allowing us to care for you. We strive to exceed your expectations in the delivery of care and service provided to you and your family. If you need to contact us for any reason, please call us at [phone]    Instructions reviewed with patient during preadmission testing phone interview. Mireya Ruelas. 2/24/2020 .1:29 PM      ADDITIONAL EDUCATIONAL INFORMATION REVIEWED PER PHONE WITH YOU AND/OR YOUR FAMILY:  No Bring a urine sample on day of surgery  Yes Pain Goal-Taking Control of Your Pain  Yes FAQs about Surgical Site Infections  Call Dr. Jacquelyn Fisher and ask Hibiclens® Bathing Instructions   Yes Antibacterial Soap  No Natanael® Wipes Bathing Instructions (Obtained from: [email or web address].[employer]/. pdf )  Yes Incentive Spirometer Education  No Other

## 2020-02-25 ENCOUNTER — ANESTHESIA (OUTPATIENT)
Dept: OPERATING ROOM | Age: 60
DRG: 487 | End: 2020-02-25
Payer: COMMERCIAL

## 2020-02-25 ENCOUNTER — HOSPITAL ENCOUNTER (INPATIENT)
Age: 60
LOS: 2 days | Discharge: HOME | DRG: 487 | End: 2020-02-27
Attending: ORTHOPAEDIC SURGERY | Admitting: ORTHOPAEDIC SURGERY
Payer: COMMERCIAL

## 2020-02-25 VITALS — DIASTOLIC BLOOD PRESSURE: 103 MMHG | OXYGEN SATURATION: 99 % | SYSTOLIC BLOOD PRESSURE: 155 MMHG | TEMPERATURE: 97.5 F

## 2020-02-25 DIAGNOSIS — Z96.652 STATUS POST TOTAL LEFT KNEE REPLACEMENT: ICD-10-CM

## 2020-02-25 PROBLEM — T84.54XA INFECTION OF TOTAL LEFT KNEE REPLACEMENT (HCC): Status: ACTIVE | Noted: 2020-02-25

## 2020-02-25 LAB
ABO/RH: NORMAL
ANION GAP SERPL CALCULATED.3IONS-SCNC: 14 MMOL/L (ref 3–16)
ANTIBODY SCREEN: NORMAL
BASOPHILS ABSOLUTE: 0 K/UL (ref 0–0.2)
BASOPHILS RELATIVE PERCENT: 0.7 %
BILIRUBIN URINE: NEGATIVE
BLOOD, URINE: ABNORMAL
BUN BLDV-MCNC: 16 MG/DL (ref 7–20)
C-REACTIVE PROTEIN: 8.2 MG/L (ref 0–5.1)
CALCIUM SERPL-MCNC: 9.1 MG/DL (ref 8.3–10.6)
CHLORIDE BLD-SCNC: 98 MMOL/L (ref 99–110)
CLARITY: CLEAR
CO2: 25 MMOL/L (ref 21–32)
COLOR: YELLOW
CREAT SERPL-MCNC: 0.9 MG/DL (ref 0.8–1.3)
EOSINOPHILS ABSOLUTE: 0.2 K/UL (ref 0–0.6)
EOSINOPHILS RELATIVE PERCENT: 2.7 %
GFR AFRICAN AMERICAN: >60
GFR NON-AFRICAN AMERICAN: >60
GLUCOSE BLD-MCNC: 106 MG/DL (ref 70–99)
GLUCOSE BLD-MCNC: 109 MG/DL (ref 70–99)
GLUCOSE BLD-MCNC: 111 MG/DL (ref 70–99)
GLUCOSE URINE: NEGATIVE MG/DL
HCT VFR BLD CALC: 35.9 % (ref 40.5–52.5)
HEMOGLOBIN: 11.9 G/DL (ref 13.5–17.5)
INR BLD: 1.09 (ref 0.86–1.14)
KETONES, URINE: NEGATIVE MG/DL
LEUKOCYTE ESTERASE, URINE: NEGATIVE
LYMPHOCYTES ABSOLUTE: 1.8 K/UL (ref 1–5.1)
LYMPHOCYTES RELATIVE PERCENT: 29 %
MCH RBC QN AUTO: 30.9 PG (ref 26–34)
MCHC RBC AUTO-ENTMCNC: 33.1 G/DL (ref 31–36)
MCV RBC AUTO: 93.3 FL (ref 80–100)
MICROSCOPIC EXAMINATION: YES
MONOCYTES ABSOLUTE: 0.6 K/UL (ref 0–1.3)
MONOCYTES RELATIVE PERCENT: 10 %
NEUTROPHILS ABSOLUTE: 3.6 K/UL (ref 1.7–7.7)
NEUTROPHILS RELATIVE PERCENT: 57.6 %
NITRITE, URINE: NEGATIVE
PDW BLD-RTO: 14.9 % (ref 12.4–15.4)
PERFORMED ON: ABNORMAL
PERFORMED ON: ABNORMAL
PH UA: 6 (ref 5–8)
PLATELET # BLD: 234 K/UL (ref 135–450)
PMV BLD AUTO: 8.2 FL (ref 5–10.5)
POTASSIUM SERPL-SCNC: 3.7 MMOL/L (ref 3.5–5.1)
PREALBUMIN: 21.2 MG/DL (ref 20–40)
PROTEIN UA: NEGATIVE MG/DL
PROTHROMBIN TIME: 12.7 SEC (ref 10–13.2)
RBC # BLD: 3.85 M/UL (ref 4.2–5.9)
RBC UA: ABNORMAL /HPF (ref 0–4)
SEDIMENTATION RATE, ERYTHROCYTE: 36 MM/HR (ref 0–20)
SODIUM BLD-SCNC: 137 MMOL/L (ref 136–145)
SPECIFIC GRAVITY UA: 1.02 (ref 1–1.03)
URINE TYPE: ABNORMAL
UROBILINOGEN, URINE: 0.2 E.U./DL
WBC # BLD: 6.3 K/UL (ref 4–11)
WBC UA: ABNORMAL /HPF (ref 0–5)

## 2020-02-25 PROCEDURE — 85610 PROTHROMBIN TIME: CPT

## 2020-02-25 PROCEDURE — 2580000003 HC RX 258: Performed by: ORTHOPAEDIC SURGERY

## 2020-02-25 PROCEDURE — C1776 JOINT DEVICE (IMPLANTABLE): HCPCS | Performed by: ORTHOPAEDIC SURGERY

## 2020-02-25 PROCEDURE — 0SUW09Z SUPPLEMENT LEFT KNEE JOINT, TIBIAL SURFACE WITH LINER, OPEN APPROACH: ICD-10-PCS | Performed by: ORTHOPAEDIC SURGERY

## 2020-02-25 PROCEDURE — 2580000003 HC RX 258: Performed by: ANESTHESIOLOGY

## 2020-02-25 PROCEDURE — 94150 VITAL CAPACITY TEST: CPT

## 2020-02-25 PROCEDURE — 85652 RBC SED RATE AUTOMATED: CPT

## 2020-02-25 PROCEDURE — 85025 COMPLETE CBC W/AUTO DIFF WBC: CPT

## 2020-02-25 PROCEDURE — 0SPD09Z REMOVAL OF LINER FROM LEFT KNEE JOINT, OPEN APPROACH: ICD-10-PCS | Performed by: ORTHOPAEDIC SURGERY

## 2020-02-25 PROCEDURE — 6370000000 HC RX 637 (ALT 250 FOR IP): Performed by: ORTHOPAEDIC SURGERY

## 2020-02-25 PROCEDURE — 64447 NJX AA&/STRD FEMORAL NRV IMG: CPT | Performed by: ANESTHESIOLOGY

## 2020-02-25 PROCEDURE — 2500000003 HC RX 250 WO HCPCS: Performed by: ORTHOPAEDIC SURGERY

## 2020-02-25 PROCEDURE — 86140 C-REACTIVE PROTEIN: CPT

## 2020-02-25 PROCEDURE — 80048 BASIC METABOLIC PNL TOTAL CA: CPT

## 2020-02-25 PROCEDURE — 3700000001 HC ADD 15 MINUTES (ANESTHESIA): Performed by: ORTHOPAEDIC SURGERY

## 2020-02-25 PROCEDURE — 3600000015 HC SURGERY LEVEL 5 ADDTL 15MIN: Performed by: ORTHOPAEDIC SURGERY

## 2020-02-25 PROCEDURE — 6360000002 HC RX W HCPCS

## 2020-02-25 PROCEDURE — 87102 FUNGUS ISOLATION CULTURE: CPT

## 2020-02-25 PROCEDURE — 6360000002 HC RX W HCPCS: Performed by: ORTHOPAEDIC SURGERY

## 2020-02-25 PROCEDURE — C1751 CATH, INF, PER/CENT/MIDLINE: HCPCS

## 2020-02-25 PROCEDURE — 6360000002 HC RX W HCPCS: Performed by: NURSE ANESTHETIST, CERTIFIED REGISTERED

## 2020-02-25 PROCEDURE — 87070 CULTURE OTHR SPECIMN AEROBIC: CPT

## 2020-02-25 PROCEDURE — 87205 SMEAR GRAM STAIN: CPT

## 2020-02-25 PROCEDURE — 87086 URINE CULTURE/COLONY COUNT: CPT

## 2020-02-25 PROCEDURE — 84134 ASSAY OF PREALBUMIN: CPT

## 2020-02-25 PROCEDURE — 3700000000 HC ANESTHESIA ATTENDED CARE: Performed by: ORTHOPAEDIC SURGERY

## 2020-02-25 PROCEDURE — 81001 URINALYSIS AUTO W/SCOPE: CPT

## 2020-02-25 PROCEDURE — 7100000000 HC PACU RECOVERY - FIRST 15 MIN: Performed by: ORTHOPAEDIC SURGERY

## 2020-02-25 PROCEDURE — 2709999900 HC NON-CHARGEABLE SUPPLY: Performed by: ORTHOPAEDIC SURGERY

## 2020-02-25 PROCEDURE — C1729 CATH, DRAINAGE: HCPCS | Performed by: ORTHOPAEDIC SURGERY

## 2020-02-25 PROCEDURE — 87176 TISSUE HOMOGENIZATION CULTR: CPT

## 2020-02-25 PROCEDURE — 94760 N-INVAS EAR/PLS OXIMETRY 1: CPT

## 2020-02-25 PROCEDURE — 7100000001 HC PACU RECOVERY - ADDTL 15 MIN: Performed by: ORTHOPAEDIC SURGERY

## 2020-02-25 PROCEDURE — 36569 INSJ PICC 5 YR+ W/O IMAGING: CPT

## 2020-02-25 PROCEDURE — 86901 BLOOD TYPING SEROLOGIC RH(D): CPT

## 2020-02-25 PROCEDURE — 2720000010 HC SURG SUPPLY STERILE: Performed by: ORTHOPAEDIC SURGERY

## 2020-02-25 PROCEDURE — 86900 BLOOD TYPING SEROLOGIC ABO: CPT

## 2020-02-25 PROCEDURE — 86850 RBC ANTIBODY SCREEN: CPT

## 2020-02-25 PROCEDURE — 6360000002 HC RX W HCPCS: Performed by: ANESTHESIOLOGY

## 2020-02-25 PROCEDURE — 1200000000 HC SEMI PRIVATE

## 2020-02-25 PROCEDURE — 0SBD0ZZ EXCISION OF LEFT KNEE JOINT, OPEN APPROACH: ICD-10-PCS | Performed by: ORTHOPAEDIC SURGERY

## 2020-02-25 PROCEDURE — 2500000003 HC RX 250 WO HCPCS: Performed by: NURSE ANESTHETIST, CERTIFIED REGISTERED

## 2020-02-25 PROCEDURE — 3600000005 HC SURGERY LEVEL 5 BASE: Performed by: ORTHOPAEDIC SURGERY

## 2020-02-25 PROCEDURE — 02HV33Z INSERTION OF INFUSION DEVICE INTO SUPERIOR VENA CAVA, PERCUTANEOUS APPROACH: ICD-10-PCS | Performed by: ORTHOPAEDIC SURGERY

## 2020-02-25 RX ORDER — PRAVASTATIN SODIUM 20 MG
20 TABLET ORAL NIGHTLY
Status: DISCONTINUED | OUTPATIENT
Start: 2020-02-25 | End: 2020-02-27 | Stop reason: HOSPADM

## 2020-02-25 RX ORDER — OXYCODONE HYDROCHLORIDE 5 MG/1
5 TABLET ORAL EVERY 4 HOURS PRN
Status: DISCONTINUED | OUTPATIENT
Start: 2020-02-25 | End: 2020-02-27 | Stop reason: HOSPADM

## 2020-02-25 RX ORDER — PROMETHAZINE HYDROCHLORIDE 12.5 MG/1
12.5 TABLET ORAL EVERY 6 HOURS PRN
Status: DISCONTINUED | OUTPATIENT
Start: 2020-02-25 | End: 2020-02-27 | Stop reason: HOSPADM

## 2020-02-25 RX ORDER — SENNA AND DOCUSATE SODIUM 50; 8.6 MG/1; MG/1
1 TABLET, FILM COATED ORAL 2 TIMES DAILY
Status: DISCONTINUED | OUTPATIENT
Start: 2020-02-25 | End: 2020-02-27 | Stop reason: HOSPADM

## 2020-02-25 RX ORDER — DEXAMETHASONE SODIUM PHOSPHATE 4 MG/ML
4 INJECTION, SOLUTION INTRA-ARTICULAR; INTRALESIONAL; INTRAMUSCULAR; INTRAVENOUS; SOFT TISSUE
Status: DISCONTINUED | OUTPATIENT
Start: 2020-02-25 | End: 2020-02-25 | Stop reason: HOSPADM

## 2020-02-25 RX ORDER — ARFORMOTEROL TARTRATE 15 UG/2ML
15 SOLUTION RESPIRATORY (INHALATION) 2 TIMES DAILY
Status: DISCONTINUED | OUTPATIENT
Start: 2020-02-25 | End: 2020-02-27 | Stop reason: HOSPADM

## 2020-02-25 RX ORDER — ONDANSETRON 2 MG/ML
INJECTION INTRAMUSCULAR; INTRAVENOUS PRN
Status: DISCONTINUED | OUTPATIENT
Start: 2020-02-25 | End: 2020-02-25 | Stop reason: SDUPTHER

## 2020-02-25 RX ORDER — DEXAMETHASONE SODIUM PHOSPHATE 4 MG/ML
INJECTION, SOLUTION INTRA-ARTICULAR; INTRALESIONAL; INTRAMUSCULAR; INTRAVENOUS; SOFT TISSUE
Status: COMPLETED
Start: 2020-02-25 | End: 2020-02-25

## 2020-02-25 RX ORDER — MAGNESIUM HYDROXIDE 1200 MG/15ML
LIQUID ORAL CONTINUOUS PRN
Status: COMPLETED | OUTPATIENT
Start: 2020-02-25 | End: 2020-02-25

## 2020-02-25 RX ORDER — SODIUM CHLORIDE, SODIUM LACTATE, POTASSIUM CHLORIDE, CALCIUM CHLORIDE 600; 310; 30; 20 MG/100ML; MG/100ML; MG/100ML; MG/100ML
INJECTION, SOLUTION INTRAVENOUS CONTINUOUS
Status: DISCONTINUED | OUTPATIENT
Start: 2020-02-25 | End: 2020-02-25

## 2020-02-25 RX ORDER — LORAZEPAM 0.5 MG/1
0.5 TABLET ORAL EVERY 8 HOURS PRN
Status: DISCONTINUED | OUTPATIENT
Start: 2020-02-25 | End: 2020-02-26

## 2020-02-25 RX ORDER — FENTANYL CITRATE 50 UG/ML
INJECTION, SOLUTION INTRAMUSCULAR; INTRAVENOUS
Status: COMPLETED
Start: 2020-02-25 | End: 2020-02-25

## 2020-02-25 RX ORDER — ROCURONIUM BROMIDE 10 MG/ML
INJECTION, SOLUTION INTRAVENOUS PRN
Status: DISCONTINUED | OUTPATIENT
Start: 2020-02-25 | End: 2020-02-25 | Stop reason: SDUPTHER

## 2020-02-25 RX ORDER — VANCOMYCIN HYDROCHLORIDE 1 G/20ML
INJECTION, POWDER, LYOPHILIZED, FOR SOLUTION INTRAVENOUS PRN
Status: DISCONTINUED | OUTPATIENT
Start: 2020-02-25 | End: 2020-02-25 | Stop reason: ALTCHOICE

## 2020-02-25 RX ORDER — FENTANYL CITRATE 50 UG/ML
25 INJECTION, SOLUTION INTRAMUSCULAR; INTRAVENOUS EVERY 5 MIN PRN
Status: DISCONTINUED | OUTPATIENT
Start: 2020-02-25 | End: 2020-02-25 | Stop reason: HOSPADM

## 2020-02-25 RX ORDER — OXYCODONE HYDROCHLORIDE 5 MG/1
10 TABLET ORAL EVERY 4 HOURS PRN
Status: DISCONTINUED | OUTPATIENT
Start: 2020-02-25 | End: 2020-02-27 | Stop reason: HOSPADM

## 2020-02-25 RX ORDER — FENTANYL CITRATE 50 UG/ML
INJECTION, SOLUTION INTRAMUSCULAR; INTRAVENOUS PRN
Status: DISCONTINUED | OUTPATIENT
Start: 2020-02-25 | End: 2020-02-25 | Stop reason: SDUPTHER

## 2020-02-25 RX ORDER — SENNOSIDES 8.6 MG
650 CAPSULE ORAL EVERY 8 HOURS PRN
Status: DISCONTINUED | OUTPATIENT
Start: 2020-02-25 | End: 2020-02-25 | Stop reason: ALTCHOICE

## 2020-02-25 RX ORDER — MIDAZOLAM HYDROCHLORIDE 1 MG/ML
INJECTION INTRAMUSCULAR; INTRAVENOUS
Status: COMPLETED
Start: 2020-02-25 | End: 2020-02-25

## 2020-02-25 RX ORDER — LIDOCAINE HYDROCHLORIDE 20 MG/ML
INJECTION, SOLUTION INTRAVENOUS PRN
Status: DISCONTINUED | OUTPATIENT
Start: 2020-02-25 | End: 2020-02-25 | Stop reason: SDUPTHER

## 2020-02-25 RX ORDER — ACETAMINOPHEN 325 MG/1
650 TABLET ORAL EVERY 6 HOURS
Status: DISCONTINUED | OUTPATIENT
Start: 2020-02-25 | End: 2020-02-25 | Stop reason: ALTCHOICE

## 2020-02-25 RX ORDER — OXYCODONE AND ACETAMINOPHEN 10; 325 MG/1; MG/1
1 TABLET ORAL EVERY 6 HOURS PRN
Qty: 40 TABLET | Refills: 0 | Status: SHIPPED | OUTPATIENT
Start: 2020-02-25 | End: 2020-03-03

## 2020-02-25 RX ORDER — OXYCODONE HYDROCHLORIDE AND ACETAMINOPHEN 5; 325 MG/1; MG/1
2 TABLET ORAL EVERY 6 HOURS SCHEDULED
Status: DISCONTINUED | OUTPATIENT
Start: 2020-02-25 | End: 2020-02-27 | Stop reason: HOSPADM

## 2020-02-25 RX ORDER — ROPIVACAINE HYDROCHLORIDE 5 MG/ML
INJECTION, SOLUTION EPIDURAL; INFILTRATION; PERINEURAL
Status: COMPLETED | OUTPATIENT
Start: 2020-02-25 | End: 2020-02-25

## 2020-02-25 RX ORDER — BUDESONIDE 0.5 MG/2ML
0.5 INHALANT ORAL 2 TIMES DAILY
Status: DISCONTINUED | OUTPATIENT
Start: 2020-02-25 | End: 2020-02-27 | Stop reason: HOSPADM

## 2020-02-25 RX ORDER — AMLODIPINE BESYLATE 10 MG/1
10 TABLET ORAL DAILY
Status: DISCONTINUED | OUTPATIENT
Start: 2020-02-25 | End: 2020-02-27 | Stop reason: HOSPADM

## 2020-02-25 RX ORDER — GABAPENTIN 300 MG/1
300 CAPSULE ORAL 3 TIMES DAILY
Status: DISCONTINUED | OUTPATIENT
Start: 2020-02-25 | End: 2020-02-27 | Stop reason: HOSPADM

## 2020-02-25 RX ORDER — PIOGLITAZONEHYDROCHLORIDE 15 MG/1
15 TABLET ORAL DAILY
Status: DISCONTINUED | OUTPATIENT
Start: 2020-02-25 | End: 2020-02-27 | Stop reason: HOSPADM

## 2020-02-25 RX ORDER — MIDAZOLAM HYDROCHLORIDE 1 MG/ML
INJECTION INTRAMUSCULAR; INTRAVENOUS PRN
Status: DISCONTINUED | OUTPATIENT
Start: 2020-02-25 | End: 2020-02-25 | Stop reason: SDUPTHER

## 2020-02-25 RX ORDER — DEXAMETHASONE SODIUM PHOSPHATE 4 MG/ML
INJECTION, SOLUTION INTRA-ARTICULAR; INTRALESIONAL; INTRAMUSCULAR; INTRAVENOUS; SOFT TISSUE PRN
Status: DISCONTINUED | OUTPATIENT
Start: 2020-02-25 | End: 2020-02-25 | Stop reason: SDUPTHER

## 2020-02-25 RX ORDER — SODIUM CHLORIDE 0.9 % (FLUSH) 0.9 %
10 SYRINGE (ML) INJECTION EVERY 12 HOURS SCHEDULED
Status: DISCONTINUED | OUTPATIENT
Start: 2020-02-25 | End: 2020-02-25 | Stop reason: HOSPADM

## 2020-02-25 RX ORDER — SODIUM CHLORIDE 0.9 % (FLUSH) 0.9 %
10 SYRINGE (ML) INJECTION EVERY 12 HOURS SCHEDULED
Status: DISCONTINUED | OUTPATIENT
Start: 2020-02-25 | End: 2020-02-27 | Stop reason: HOSPADM

## 2020-02-25 RX ORDER — SODIUM CHLORIDE, SODIUM LACTATE, POTASSIUM CHLORIDE, CALCIUM CHLORIDE 600; 310; 30; 20 MG/100ML; MG/100ML; MG/100ML; MG/100ML
INJECTION, SOLUTION INTRAVENOUS CONTINUOUS
Status: DISCONTINUED | OUTPATIENT
Start: 2020-02-25 | End: 2020-02-27 | Stop reason: HOSPADM

## 2020-02-25 RX ORDER — SODIUM CHLORIDE 0.9 % (FLUSH) 0.9 %
10 SYRINGE (ML) INJECTION PRN
Status: DISCONTINUED | OUTPATIENT
Start: 2020-02-25 | End: 2020-02-27 | Stop reason: HOSPADM

## 2020-02-25 RX ORDER — ONDANSETRON 2 MG/ML
4 INJECTION INTRAMUSCULAR; INTRAVENOUS EVERY 6 HOURS PRN
Status: DISCONTINUED | OUTPATIENT
Start: 2020-02-25 | End: 2020-02-27 | Stop reason: HOSPADM

## 2020-02-25 RX ORDER — VALSARTAN AND HYDROCHLOROTHIAZIDE 320; 25 MG/1; MG/1
1 TABLET, FILM COATED ORAL DAILY
Status: DISCONTINUED | OUTPATIENT
Start: 2020-02-25 | End: 2020-02-27 | Stop reason: HOSPADM

## 2020-02-25 RX ORDER — PIOGLITAZONEHYDROCHLORIDE 15 MG/1
15 TABLET ORAL DAILY
COMMUNITY

## 2020-02-25 RX ORDER — 0.9 % SODIUM CHLORIDE 0.9 %
500 INTRAVENOUS SOLUTION INTRAVENOUS
Status: DISCONTINUED | OUTPATIENT
Start: 2020-02-25 | End: 2020-02-25 | Stop reason: HOSPADM

## 2020-02-25 RX ORDER — BUDESONIDE AND FORMOTEROL FUMARATE DIHYDRATE 160; 4.5 UG/1; UG/1
2 AEROSOL RESPIRATORY (INHALATION) 2 TIMES DAILY
Status: DISCONTINUED | OUTPATIENT
Start: 2020-02-25 | End: 2020-02-25 | Stop reason: CLARIF

## 2020-02-25 RX ORDER — AMLODIPINE, VALSARTAN AND HYDROCHLOROTHIAZIDE 10; 320; 25 MG/1; MG/1; MG/1
1 TABLET ORAL DAILY
Status: DISCONTINUED | OUTPATIENT
Start: 2020-02-25 | End: 2020-02-25 | Stop reason: RX

## 2020-02-25 RX ORDER — ROPIVACAINE HYDROCHLORIDE 5 MG/ML
INJECTION, SOLUTION EPIDURAL; INFILTRATION; PERINEURAL
Status: COMPLETED
Start: 2020-02-25 | End: 2020-02-25

## 2020-02-25 RX ORDER — SILDENAFIL CITRATE 20 MG/1
20 TABLET ORAL PRN
Status: DISCONTINUED | OUTPATIENT
Start: 2020-02-25 | End: 2020-02-25 | Stop reason: RX

## 2020-02-25 RX ORDER — LORATADINE AND PSEUDOEPHEDRINE 10; 240 MG/1; MG/1
1 TABLET, EXTENDED RELEASE ORAL DAILY
Status: DISCONTINUED | OUTPATIENT
Start: 2020-02-26 | End: 2020-02-26

## 2020-02-25 RX ORDER — LIDOCAINE HYDROCHLORIDE 10 MG/ML
5 INJECTION, SOLUTION EPIDURAL; INFILTRATION; INTRACAUDAL; PERINEURAL ONCE
Status: COMPLETED | OUTPATIENT
Start: 2020-02-25 | End: 2020-02-25

## 2020-02-25 RX ORDER — TAMSULOSIN HYDROCHLORIDE 0.4 MG/1
0.4 CAPSULE ORAL DAILY
Status: DISCONTINUED | OUTPATIENT
Start: 2020-02-25 | End: 2020-02-27 | Stop reason: HOSPADM

## 2020-02-25 RX ORDER — SODIUM CHLORIDE 0.9 % (FLUSH) 0.9 %
10 SYRINGE (ML) INJECTION PRN
Status: DISCONTINUED | OUTPATIENT
Start: 2020-02-25 | End: 2020-02-25 | Stop reason: HOSPADM

## 2020-02-25 RX ORDER — TRANEXAMIC ACID 650 1/1
1300 TABLET ORAL ONCE
Status: COMPLETED | OUTPATIENT
Start: 2020-02-25 | End: 2020-02-25

## 2020-02-25 RX ORDER — VENLAFAXINE HYDROCHLORIDE 150 MG/1
150 CAPSULE, EXTENDED RELEASE ORAL DAILY
Status: DISCONTINUED | OUTPATIENT
Start: 2020-02-26 | End: 2020-02-27 | Stop reason: HOSPADM

## 2020-02-25 RX ORDER — PROPOFOL 10 MG/ML
INJECTION, EMULSION INTRAVENOUS PRN
Status: DISCONTINUED | OUTPATIENT
Start: 2020-02-25 | End: 2020-02-25 | Stop reason: SDUPTHER

## 2020-02-25 RX ORDER — ONDANSETRON 2 MG/ML
4 INJECTION INTRAMUSCULAR; INTRAVENOUS
Status: DISCONTINUED | OUTPATIENT
Start: 2020-02-25 | End: 2020-02-25 | Stop reason: HOSPADM

## 2020-02-25 RX ORDER — FENTANYL CITRATE 50 UG/ML
50 INJECTION, SOLUTION INTRAMUSCULAR; INTRAVENOUS EVERY 5 MIN PRN
Status: COMPLETED | OUTPATIENT
Start: 2020-02-25 | End: 2020-02-25

## 2020-02-25 RX ADMIN — PROPOFOL 30 MG: 10 INJECTION, EMULSION INTRAVENOUS at 12:57

## 2020-02-25 RX ADMIN — FENTANYL CITRATE 25 MCG: 50 INJECTION, SOLUTION INTRAMUSCULAR; INTRAVENOUS at 14:24

## 2020-02-25 RX ADMIN — FENTANYL CITRATE 50 MCG: 50 INJECTION INTRAMUSCULAR; INTRAVENOUS at 12:37

## 2020-02-25 RX ADMIN — SODIUM CHLORIDE, SODIUM LACTATE, POTASSIUM CHLORIDE, AND CALCIUM CHLORIDE: 600; 310; 30; 20 INJECTION, SOLUTION INTRAVENOUS at 10:33

## 2020-02-25 RX ADMIN — GABAPENTIN 300 MG: 300 CAPSULE ORAL at 18:33

## 2020-02-25 RX ADMIN — RIVAROXABAN 10 MG: 10 TABLET, FILM COATED ORAL at 20:02

## 2020-02-25 RX ADMIN — HYDROMORPHONE HYDROCHLORIDE 0.5 MG: 1 INJECTION, SOLUTION INTRAMUSCULAR; INTRAVENOUS; SUBCUTANEOUS at 20:03

## 2020-02-25 RX ADMIN — LIDOCAINE HYDROCHLORIDE 70 MG: 20 INJECTION, SOLUTION INTRAVENOUS at 10:54

## 2020-02-25 RX ADMIN — FENTANYL CITRATE 50 MCG: 50 INJECTION, SOLUTION INTRAMUSCULAR; INTRAVENOUS at 13:35

## 2020-02-25 RX ADMIN — MIDAZOLAM HYDROCHLORIDE 1 MG: 2 INJECTION, SOLUTION INTRAMUSCULAR; INTRAVENOUS at 11:30

## 2020-02-25 RX ADMIN — LIDOCAINE HYDROCHLORIDE 20 MG: 20 INJECTION, SOLUTION INTRAVENOUS at 11:01

## 2020-02-25 RX ADMIN — LORAZEPAM 0.5 MG: 0.5 TABLET ORAL at 18:33

## 2020-02-25 RX ADMIN — PROPOFOL 20 MG: 10 INJECTION, EMULSION INTRAVENOUS at 12:48

## 2020-02-25 RX ADMIN — FENTANYL CITRATE 25 MCG: 50 INJECTION, SOLUTION INTRAMUSCULAR; INTRAVENOUS at 14:17

## 2020-02-25 RX ADMIN — PROPOFOL 40 MG: 10 INJECTION, EMULSION INTRAVENOUS at 11:01

## 2020-02-25 RX ADMIN — OXYCODONE 10 MG: 5 TABLET ORAL at 22:10

## 2020-02-25 RX ADMIN — ONDANSETRON 4 MG: 2 INJECTION INTRAMUSCULAR; INTRAVENOUS at 12:23

## 2020-02-25 RX ADMIN — MIDAZOLAM HYDROCHLORIDE 2 MG: 2 INJECTION, SOLUTION INTRAMUSCULAR; INTRAVENOUS at 10:28

## 2020-02-25 RX ADMIN — SODIUM CHLORIDE, SODIUM LACTATE, POTASSIUM CHLORIDE, AND CALCIUM CHLORIDE: 600; 310; 30; 20 INJECTION, SOLUTION INTRAVENOUS at 15:32

## 2020-02-25 RX ADMIN — SUGAMMADEX 200 MG: 100 INJECTION, SOLUTION INTRAVENOUS at 12:42

## 2020-02-25 RX ADMIN — HYDROMORPHONE HYDROCHLORIDE 0.5 MG: 1 INJECTION, SOLUTION INTRAMUSCULAR; INTRAVENOUS; SUBCUTANEOUS at 14:02

## 2020-02-25 RX ADMIN — OXYCODONE 10 MG: 5 TABLET ORAL at 14:58

## 2020-02-25 RX ADMIN — MIDAZOLAM HYDROCHLORIDE 1 MG: 2 INJECTION, SOLUTION INTRAMUSCULAR; INTRAVENOUS at 12:38

## 2020-02-25 RX ADMIN — DEXAMETHASONE SODIUM PHOSPHATE 4 MG: 4 INJECTION, SOLUTION INTRAMUSCULAR; INTRAVENOUS at 10:28

## 2020-02-25 RX ADMIN — ROCURONIUM BROMIDE 40 MG: 10 INJECTION, SOLUTION INTRAVENOUS at 10:54

## 2020-02-25 RX ADMIN — SENNOSIDES AND DOCUSATE SODIUM 1 TABLET: 8.6; 5 TABLET ORAL at 20:02

## 2020-02-25 RX ADMIN — Medication 10 ML: at 20:45

## 2020-02-25 RX ADMIN — PROPOFOL 140 MG: 10 INJECTION, EMULSION INTRAVENOUS at 10:54

## 2020-02-25 RX ADMIN — VALSARTAN AND HYDROCHLOROTHIAZIDE 1 TABLET: 320; 25 TABLET, FILM COATED ORAL at 18:34

## 2020-02-25 RX ADMIN — FENTANYL CITRATE 50 MCG: 50 INJECTION INTRAMUSCULAR; INTRAVENOUS at 11:30

## 2020-02-25 RX ADMIN — AMLODIPINE BESYLATE 10 MG: 10 TABLET ORAL at 18:34

## 2020-02-25 RX ADMIN — CEFAZOLIN 2 G: 10 INJECTION, POWDER, FOR SOLUTION INTRAVENOUS at 10:47

## 2020-02-25 RX ADMIN — GABAPENTIN 300 MG: 300 CAPSULE ORAL at 20:02

## 2020-02-25 RX ADMIN — HYDROMORPHONE HYDROCHLORIDE 0.5 MG: 1 INJECTION, SOLUTION INTRAMUSCULAR; INTRAVENOUS; SUBCUTANEOUS at 13:55

## 2020-02-25 RX ADMIN — LIDOCAINE HYDROCHLORIDE 10 MG: 20 INJECTION, SOLUTION INTRAVENOUS at 12:48

## 2020-02-25 RX ADMIN — TRANEXAMIC ACID 1300 MG: 650 TABLET ORAL at 18:33

## 2020-02-25 RX ADMIN — VANCOMYCIN HYDROCHLORIDE 1500 MG: 10 INJECTION, POWDER, LYOPHILIZED, FOR SOLUTION INTRAVENOUS at 18:32

## 2020-02-25 RX ADMIN — TAMSULOSIN HYDROCHLORIDE 0.4 MG: 0.4 CAPSULE ORAL at 18:33

## 2020-02-25 RX ADMIN — FENTANYL CITRATE 50 MCG: 50 INJECTION, SOLUTION INTRAMUSCULAR; INTRAVENOUS at 13:29

## 2020-02-25 RX ADMIN — HYDROMORPHONE HYDROCHLORIDE 0.5 MG: 1 INJECTION, SOLUTION INTRAMUSCULAR; INTRAVENOUS; SUBCUTANEOUS at 16:30

## 2020-02-25 RX ADMIN — ROPIVACAINE HYDROCHLORIDE 20 ML: 5 INJECTION, SOLUTION EPIDURAL; INFILTRATION; PERINEURAL at 10:24

## 2020-02-25 RX ADMIN — LIDOCAINE HYDROCHLORIDE 5 ML: 10 INJECTION, SOLUTION EPIDURAL; INFILTRATION; INTRACAUDAL; PERINEURAL at 09:34

## 2020-02-25 RX ADMIN — FENTANYL CITRATE 100 MCG: 50 INJECTION INTRAMUSCULAR; INTRAVENOUS at 10:28

## 2020-02-25 RX ADMIN — TRANEXAMIC ACID 1401 MG: 1 INJECTION, SOLUTION INTRAVENOUS at 11:02

## 2020-02-25 RX ADMIN — OXYCODONE HYDROCHLORIDE AND ACETAMINOPHEN 2 TABLET: 5; 325 TABLET ORAL at 18:21

## 2020-02-25 RX ADMIN — PRAVASTATIN SODIUM 20 MG: 20 TABLET ORAL at 20:02

## 2020-02-25 ASSESSMENT — PULMONARY FUNCTION TESTS
PIF_VALUE: 1
PIF_VALUE: 22
PIF_VALUE: 22
PIF_VALUE: 18
PIF_VALUE: 18
PIF_VALUE: 13
PIF_VALUE: 21
PIF_VALUE: 22
PIF_VALUE: 18
PIF_VALUE: 18
PIF_VALUE: 22
PIF_VALUE: 21
PIF_VALUE: 18
PIF_VALUE: 21
PIF_VALUE: 24
PIF_VALUE: 0
PIF_VALUE: 20
PIF_VALUE: 20
PIF_VALUE: 22
PIF_VALUE: 23
PIF_VALUE: 20
PIF_VALUE: 24
PIF_VALUE: 22
PIF_VALUE: 23
PIF_VALUE: 21
PIF_VALUE: 13
PIF_VALUE: 22
PIF_VALUE: 10
PIF_VALUE: 12
PIF_VALUE: 19
PIF_VALUE: 22
PIF_VALUE: 25
PIF_VALUE: 20
PIF_VALUE: 22
PIF_VALUE: 21
PIF_VALUE: 1
PIF_VALUE: 18
PIF_VALUE: 22
PIF_VALUE: 4
PIF_VALUE: 1
PIF_VALUE: 0
PIF_VALUE: 20
PIF_VALUE: 19
PIF_VALUE: 20
PIF_VALUE: 10
PIF_VALUE: 1
PIF_VALUE: 1
PIF_VALUE: 3
PIF_VALUE: 0
PIF_VALUE: 19
PIF_VALUE: 22
PIF_VALUE: 20
PIF_VALUE: 25
PIF_VALUE: 18
PIF_VALUE: 11
PIF_VALUE: 22
PIF_VALUE: 0
PIF_VALUE: 0
PIF_VALUE: 22
PIF_VALUE: 12
PIF_VALUE: 11
PIF_VALUE: 20
PIF_VALUE: 24
PIF_VALUE: 18
PIF_VALUE: 21
PIF_VALUE: 22
PIF_VALUE: 23
PIF_VALUE: 22
PIF_VALUE: 18
PIF_VALUE: 22
PIF_VALUE: 23
PIF_VALUE: 22
PIF_VALUE: 18
PIF_VALUE: 21
PIF_VALUE: 22
PIF_VALUE: 23
PIF_VALUE: 20
PIF_VALUE: 22
PIF_VALUE: 21
PIF_VALUE: 23
PIF_VALUE: 1
PIF_VALUE: 11
PIF_VALUE: 11
PIF_VALUE: 23
PIF_VALUE: 24
PIF_VALUE: 6
PIF_VALUE: 22
PIF_VALUE: 5
PIF_VALUE: 23
PIF_VALUE: 22
PIF_VALUE: 22
PIF_VALUE: 11
PIF_VALUE: 19
PIF_VALUE: 11
PIF_VALUE: 23
PIF_VALUE: 21
PIF_VALUE: 18
PIF_VALUE: 22
PIF_VALUE: 20
PIF_VALUE: 23
PIF_VALUE: 21
PIF_VALUE: 19
PIF_VALUE: 23
PIF_VALUE: 22
PIF_VALUE: 0
PIF_VALUE: 22
PIF_VALUE: 22
PIF_VALUE: 23
PIF_VALUE: 21
PIF_VALUE: 22
PIF_VALUE: 22
PIF_VALUE: 20
PIF_VALUE: 1
PIF_VALUE: 18
PIF_VALUE: 20
PIF_VALUE: 22
PIF_VALUE: 23
PIF_VALUE: 23
PIF_VALUE: 21
PIF_VALUE: 21
PIF_VALUE: 20
PIF_VALUE: 22
PIF_VALUE: 20
PIF_VALUE: 21
PIF_VALUE: 18
PIF_VALUE: 28
PIF_VALUE: 21
PIF_VALUE: 1
PIF_VALUE: 20
PIF_VALUE: 1
PIF_VALUE: 21
PIF_VALUE: 22
PIF_VALUE: 21
PIF_VALUE: 20
PIF_VALUE: 23
PIF_VALUE: 13
PIF_VALUE: 21
PIF_VALUE: 13

## 2020-02-25 ASSESSMENT — PAIN SCALES - GENERAL
PAINLEVEL_OUTOF10: 6
PAINLEVEL_OUTOF10: 5
PAINLEVEL_OUTOF10: 10
PAINLEVEL_OUTOF10: 6
PAINLEVEL_OUTOF10: 7
PAINLEVEL_OUTOF10: 10
PAINLEVEL_OUTOF10: 6
PAINLEVEL_OUTOF10: 7
PAINLEVEL_OUTOF10: 9
PAINLEVEL_OUTOF10: 8
PAINLEVEL_OUTOF10: 8

## 2020-02-25 ASSESSMENT — PAIN DESCRIPTION - FREQUENCY
FREQUENCY: CONTINUOUS
FREQUENCY: CONTINUOUS

## 2020-02-25 ASSESSMENT — PAIN DESCRIPTION - ORIENTATION
ORIENTATION: LEFT
ORIENTATION: LEFT

## 2020-02-25 ASSESSMENT — PAIN DESCRIPTION - PROGRESSION
CLINICAL_PROGRESSION: GRADUALLY WORSENING
CLINICAL_PROGRESSION: GRADUALLY WORSENING

## 2020-02-25 ASSESSMENT — PAIN DESCRIPTION - DESCRIPTORS
DESCRIPTORS: ACHING
DESCRIPTORS: ACHING;STABBING

## 2020-02-25 ASSESSMENT — PAIN DESCRIPTION - LOCATION
LOCATION: KNEE
LOCATION: KNEE

## 2020-02-25 ASSESSMENT — PAIN DESCRIPTION - PAIN TYPE
TYPE: SURGICAL PAIN
TYPE: SURGICAL PAIN

## 2020-02-25 ASSESSMENT — COPD QUESTIONNAIRES: CAT_SEVERITY: MILD

## 2020-02-25 ASSESSMENT — PAIN DESCRIPTION - ONSET
ONSET: ON-GOING
ONSET: ON-GOING

## 2020-02-25 ASSESSMENT — PAIN - FUNCTIONAL ASSESSMENT
PAIN_FUNCTIONAL_ASSESSMENT: PREVENTS OR INTERFERES SOME ACTIVE ACTIVITIES AND ADLS
PAIN_FUNCTIONAL_ASSESSMENT: PREVENTS OR INTERFERES SOME ACTIVE ACTIVITIES AND ADLS

## 2020-02-25 NOTE — BRIEF OP NOTE
Brief Postoperative Note  ______________________________________________________________    Patient: Shiv Mathur  YOB: 1960  MRN: [id number]  Date of Procedure: 2/25/2020    Pre-Op Diagnosis: Status post total left knee replacement [Z96.652]    Post-Op Diagnosis: Same       Procedure(s):  INCISION AND DRAINAGE, CULTURE AND CLOSURE LEFT TOTAL KNEE ARTHROPLASTY WOUND,  POLY EXCHANGE    Anesthesia: General    Surgeon(s):  Yury Russell MD    Assistant: Manuel    Estimated Blood Loss (mL): 838 mls    Complications: None    Specimens:   ID Type Source Tests Collected by Time Destination   1 : LEFT KNEE CULTURE #1 Tissue Tissue CULTURE, FUNGUS, CULTURE, TISSUE Yury Russell MD 2/25/2020 1123    2 : LEFT KNEE CULTURE #2 Tissue Tissue CULTURE, FUNGUS, CULTURE, TISSUE Yuyr Russell MD 2/25/2020 1126    3 : LEFT KNEE CULTURE #3 Tissue Tissue CULTURE, FUNGUS, CULTURE, TISSUE Yury Russell MD 2/25/2020 1128    4 : LEFT KNEE CULTURE #4 Tissue Tissue CULTURE, FUNGUS, CULTURE, TISSUE Yury Russell MD 2/25/2020 1129    5 : LEFT KNEE CULTURE #5 Tissue Tissue CULTURE, FUNGUS, CULTURE, CHONG Russell MD 2/25/2020 1143        Implants:  * No implants in log *      Drains:   Closed/Suction Drain Left; Anterior Knee Accordion 10 Turkish (Active)       Findings: Same    Lino Sykes MD  Date: 2/25/2020  Time: 1:03 PM

## 2020-02-25 NOTE — ANESTHESIA POSTPROCEDURE EVALUATION
Department of Anesthesiology  Postprocedure Note    Patient: Carlitos Angel  MRN: [id number]  YOB: 1960  Date of evaluation: 2/25/2020  Time:  1:49 PM     Procedure Summary     Date:  02/25/20 Room / Location:  [address] [place], [place] [address]    Anesthesia Start:  0150 Anesthesia Stop:  4744    Procedure:  INCISION AND DRAINAGE, CULTURE AND CLOSURE LEFT TOTAL KNEE ARTHROPLASTY WOUND,  POLY EXCHANGE (Left ) Diagnosis:       Status post total left knee replacement      (Status post total left knee replacement [H71.598])    Surgeon:  Natasha Rodriguez MD Responsible Provider:  Shu Leyva DO    Anesthesia Type:  general ASA Status:  3          Anesthesia Type: general    Duc Phase I: Duc Score: 8    Duc Phase II:      Last vitals: Reviewed and per EMR flowsheets.        Anesthesia Post Evaluation    Patient location during evaluation: PACU  Patient participation: complete - patient participated  Level of consciousness: awake and alert  Pain score: 7  Airway patency: patent  Nausea & Vomiting: no nausea and no vomiting  Complications: no  Cardiovascular status: blood pressure returned to baseline  Respiratory status: acceptable  Hydration status: euvolemic

## 2020-02-25 NOTE — DISCHARGE INSTR - COC
Continuity of Care Form    Patient Name: No Banda   :  1960  MRN:  [id number]    Admit date:  2020  Discharge date:  ***    Code Status Order: Prior   Advance Directives:   [facility] Documentation     Date/Time Healthcare Directive Type of Healthcare Directive Copy in [facility] [address] Agent's Name Healthcare Agent's Phone Number    20 9675  No, patient does not have an advance directive for healthcare treatment -- -- -- -- --    20 1325  No, patient does not have an advance directive for healthcare treatment -- -- -- -- --          Admitting Physician:  Mary Vaughn MD  PCP: Nannette Zhao MD    Discharging Nurse: [facility] Unit/Room#: OR/NONE  Discharging Unit Phone Number: ***    Emergency Contact:   Extended Emergency Contact Information  Primary Emergency Contact: Cartertabitha BarneyOliver  Address: [address] [place] [address], [address] Phone: [phone]  Mobile Phone: [phone]  Relation: Spouse    Past Surgical History:  Past Surgical History:   Procedure Laterality Date    ANKLE SURGERY Right [id number]    RIGHT ANKLE ARTHROTOMY WITH EXPLORATION, REMOVAL OF LOOSE    CARPAL TUNNEL RELEASE Left     CERVICAL SPINE SURGERY      COLONOSCOPY      FOOT SURGERY      bone spur removed right foot    KNEE ARTHROPLASTY Left     NERVE SURGERY      ulnar, bilat.     REVISION TOTAL KNEE ARTHROPLASTY Left 2019    LEFT TOTAL KNEE DEBRIDEMENT AND POLYETHYLENE EXCHANGE performed by Mary Vaughn MD at [facility] Bilateral        Immunization History:   Immunization History   Administered Date(s) Administered    Influenza Virus Vaccine 10/19/2010    Pneumococcal Polysaccharide ([id number]) 2010       Active Problems:  Patient Active Problem List   Diagnosis Code    Hypertension I10    Vomiting R11.10    Abdominal pain R10.9    Abdominal pain, epigastric R10.13    Asthma J45.909    Chronic back pain M54.9, G89.29    GERD (gastroesophageal reflux disease) K21.9    Diabetes mellitus ([facility]) E11.9    Acute renal failure ([facility]) N17.9    Vomiting R11.10    Hearing loss d/t noise H83.3X9    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    COPD (chronic obstructive pulmonary disease) ([facility]) J44.9       Isolation/Infection:   Isolation          No Isolation        Patient Infection Status     None to display          Nurse Assessment:  Last Vital Signs: /80   Pulse 74   Temp 97.5 °F (36.4 °C) (Oral)   Resp 16   Ht 6' 1\" (1.854 m)   Wt 203 lb (92.1 kg)   SpO2 97%   BMI 26.78 kg/m²     Last documented pain score (0-10 scale):    Last Weight:   Wt Readings from Last 1 Encounters:   02/25/20 203 lb (92.1 kg)     Mental Status:  {IP PT MENTAL STATUS:20030}    IV Access:  {[facility] IV ACCESS:030966021}    Nursing Mobility/ADLs:  Walking   {P DME BNIQ:102070005}  Transfer  {P DME QWSZ:425764533}  Bathing  {CHP DME ANIP:345188396}  Dressing  {CHP DME MEDD:282406072}  Toileting  {CHP DME OQRR:416739241}  Feeding  {P DME GSSO:467342604}  Med Admin  {P DME XHOM:096644997}  Med Delivery   {[facility] MED Delivery:398626376}    Wound Care Documentation and Therapy:        Elimination:  Continence:   · Bowel: {YES / NL:92827}  · Bladder: {YES / RT:86064}  Urinary Catheter: {Urinary Catheter:155483428}   Colostomy/Ileostomy/Ileal Conduit: {YES / SM:24999}       Date of Last BM: ***  No intake or output data in the 24 hours ending 02/25/20 1303  No intake/output data recorded.     Safety Concerns:     [address] [employer] Safety Concerns:202922059}    Impairments/Disabilities:      [employer] Impairments/Disabilities:739891024}    Nutrition Therapy:  Current Nutrition Therapy:   [employer] Diet List:823027909}    Routes of Feeding: {CHP DME Other Feedings:710372655}  Liquids: {Slp liquid thickness:73435}  Daily Fluid Restriction: {STEPHANIE CARRILLO Yes amt CNOBWVK:697875920}  Last Modified Barium Swallow with Video (Video Swallowing Test): {Done Not Done GBID:150088249}    Treatments at the Time of Hospital Discharge:   Respiratory Treatments: ***  Oxygen Therapy:  {Therapy; copd oxygen:12203}  Ventilator:    { CC Vent YKUP:365668544}    Rehab Therapies: {THERAPEUTIC INTERVENTION:4274867932}  Weight Bearing Status/Restrictions: {[facility] Weight Bearin}  Other Medical Equipment (for information only, NOT a DME order):  {EQUIPMENT:152466769}  Other Treatments: ***    Patient's personal belongings (please select all that are sent with patient):  {CHP DME Belongings:241106634}    RN SIGNATURE:  {Esignature:402702135}    CASE MANAGEMENT/SOCIAL WORK SECTION    Inpatient Status Date: ***    Readmission Risk Assessment Score:  Readmission Risk              Risk of Unplanned Readmission:        9           Discharging to Facility/ Agency   · Name:   · Address:  · Phone:  · Fax:    Dialysis Facility (if applicable)   · Name:  · Address:  · Dialysis Schedule:  · Phone:  · Fax:    / signature: {Esignature:574413995}    PHYSICIAN SECTION    Prognosis: Good    Condition at Discharge: Stable    Rehab Potential (if transferring to Rehab): Good    Recommended Labs or Other Treatments After Discharge:   INFUSION ORDERS:  Daptomycin 500 mg iv daily through 20  Ceftriaxone 2 gm daily through 20  - First dose given in hospital  - PICC   - Disposition / date discharge  - Check CBC w diff, CMP, ESR, CRP, vancomycin trough every Mon or Tue - FAX result to [phone]  - Call with antibiotic / infusion issues, [phone]  - No f/u in outpatient ID office necessary       Physician Certification: I certify the above information and transfer of Jennifer Greer  is necessary for the continuing treatment of the diagnosis listed and that he requires Home Care for less 30 days.      Update Admission H&P: No change in H&P    PHYSICIAN SIGNATURE: {[facility]:094576674}

## 2020-02-25 NOTE — ANESTHESIA PRE PROCEDURE
gastritis     Hypertension     Pancreatitis     peripheral neuropathy     both legs    Spinal stenosis     spinal stenosis     spinal stenosis, DDD    Ulcer        Past Surgical History:        Procedure Laterality Date    ANKLE SURGERY Right [id number]    RIGHT ANKLE ARTHROTOMY WITH EXPLORATION, REMOVAL OF LOOSE    CARPAL TUNNEL RELEASE Left     CERVICAL SPINE SURGERY      COLONOSCOPY      FOOT SURGERY      bone spur removed right foot    KNEE ARTHROPLASTY Left     NERVE SURGERY      ulnar, bilat.  REVISION TOTAL KNEE ARTHROPLASTY Left 2019    LEFT TOTAL KNEE DEBRIDEMENT AND POLYETHYLENE EXCHANGE performed by Estuardo Mclaughlin MD [address] [facility] [address] Bilateral        Social History:    Social History     Tobacco Use    Smoking status: Former Smoker     Years: 5.00     Last attempt to quit: 1991     Years since quittin.5    Smokeless tobacco: Never Used   Substance Use Topics    Alcohol use: Yes     Comment: occasional                                Counseling given: Not Answered      Vital Signs (Current):   Vitals:    20 1324 20 0738   BP:  133/80   Pulse:  69   Resp:  16   Temp:  97.5 °F (36.4 °C)   TempSrc:  Oral   SpO2:  94%   Weight: 206 lb (93.4 kg) 203 lb (92.1 kg)   Height: 6' 1\" (1.854 m) 6' 1\" (1.854 m)                                              BP Readings from Last 3 Encounters:   20 133/80   19 132/75   19 130/70       NPO Status: Time of last liquid consumption: 0600                        Time of last solid consumption:                         Date of last liquid consumption: 20                        Date of last solid food consumption: 20    BMI:   Wt Readings from Last 3 Encounters:   20 203 lb (92.1 kg)   19 206 lb (93.4 kg)   19 213 lb (96.6 kg)     Body mass index is 26.78 kg/m².     CBC:   Lab Results   Component Value Date    WBC 6.3 2020    RBC 3.85 2020    HGB 11.9

## 2020-02-25 NOTE — PROGRESS NOTES
PACU Transfer Note    Vitals:    02/25/20 1530   BP: (!) 150/79   Pulse: 79   Resp: 22   Temp: 97.9 °F (36.6 °C)   SpO2: 97%   BP within 20% of pre-op    In: 220 [I.V.:220]  Out: 25 [Drains:25]    Pain assessment:  present - adequately treated  Pain Level: (appears to sleep resp easy)    Report given to Receiving unit BLAIR Lozada     2/25/2020 3:40 PM

## 2020-02-25 NOTE — ANESTHESIA PROCEDURE NOTES
Peripheral Block    Patient location during procedure: pre-op  Start time: 2/25/2020 10:10 AM  End time: 2/25/2020 10:15 AM  Staffing  Anesthesiologist: David Rosenthal DO  Performed: anesthesiologist   Preanesthetic Checklist  Completed: patient identified, site marked, surgical consent, pre-op evaluation, timeout performed, IV checked, risks and benefits discussed, monitors and equipment checked, anesthesia consent given, oxygen available and patient being monitored  Peripheral Block  Patient position: supine  Prep: ChloraPrep  Patient monitoring: cardiac monitor, continuous pulse ox, frequent blood pressure checks and IV access  Block type: Saphenous  Laterality: left  Injection technique: single-shot  Procedures: ultrasound guided  Provider prep: mask  Needle  Needle type: short-bevel   Needle gauge: 21 G  Needle length: 5 cm  Needle localization: ultrasound guidance  Assessment  Injection assessment: negative aspiration for heme, no paresthesia on injection and local visualized surrounding nerve on ultrasound  Hemodynamics: stable  Additional Notes  20 cc ropi with 4 mg decadron   Reason for block: post-op pain management, primary anesthetic and at surgeon's request

## 2020-02-25 NOTE — CONSULTS
Clinical Pharmacy Consult Note    Admit date: 2/25/2020    Subjective/Objective:  Patient is a  60 yom s/p incision and drainage, culture and closure left total knee arthroplasty wound, poly exchange. Patient had reconstruction of failed extensor mechanism/repair at an outside hospital on 2/13/20. Prior to today's procedure patient had drainage from his incision site. PMH significant for HTN and COPD. Pharmacy is consulted to dose Vancomycin per Dr. Krista Lopez. Pertinent Medications:  Cefazolin 2g (2/25, once pre-op)  Vancomycin 2g (to wound at closing)  Vancomycin 1.5g q12h (2/25-)    PERTINENT LABS:  Recent Labs     02/25/20  0802      K 3.7   CL 98*   CO2 25   BUN 16   CREATININE 0.9       Estimated Creatinine Clearance: 99 mL/min (based on SCr of 0.9 mg/dL). Recent Labs     02/25/20  0802   WBC 6.3   HGB 11.9*   HCT 35.9*   MCV 93.3          Height:  6' 1\" (185.4 cm)  Weight: 203 lb (92.1 kg)    Micro:  Date Site Micro Susceptibility   2/25 Wound     2.25  Urine               Assessment/Plan:  1. Osteomyelitis/prosthetic joint infection  :  vancomycin day #1  Vancomycin  · Will start Vancomycin 1.5 g q12h. Provides ~ 16 mg/kg and is based on a half-life elimination of 8 hours. · Clinical pharmacist will follow-up in AM.  · Renal function will be monitored closely and dosing will be adjusted as appropriate. Please call with any questions. Thank you for consulting pharmacy!   Arpita Aldrich PharmD, [facility] 2/25/2020 5:25 PM

## 2020-02-25 NOTE — PROCEDURES
Received call from [facility] staff regarding PICC order, explained results of labs need to be obtained before insertion, per Ishmael Jo RN, Leonie Dear RN aware of lab result need per their discussion 2/24 before line can be inserted. Will follow.

## 2020-02-25 NOTE — PROGRESS NOTES
RESPIRATORY THERAPY ASSESSMENT    Name:  [address] [place] [address] Record Number:  [id number]  Age: 60 y.o. Gender: male  : 1960  Today's Date:  2020  Room:  [facility]5510-01    Assessment     Is the patient being admitted for a COPD or Asthma exacerbation? No   (If yes the patient will be seen every 4 hours for the first 24 hours and then reassessed)    Patient Admission Diagnosis      Allergies  Allergies   Allergen Reactions    Metformin Other (See Comments)     GI upset    Shellfish-Derived Products Hives    Simvastatin Other (See Comments)     GI upset       Minimum Predicted Vital Capacity:     1258          Actual Vital Capacity:      2200              Pulmonary History:COPD  Home Oxygen Therapy:  room air  Home Respiratory Therapy:Mometasone/Formoterol   Current Respiratory Therapy:  Pulmicort, brovona  Treatment Type: IS       Respiratory Severity Index(RSI)   Patients with orders for inhalation medications, oxygen, or any therapeutic treatment modality will be placed on Respiratory Protocol. They will be assessed with the first treatment and at least every 72 hours thereafter. The following severity scale will be used to determine frequency of treatment intervention.     Smoking History: Pulmonary Disease or Smoking History, Greater than 15 pack year = 2    Social History  Social History     Tobacco Use    Smoking status: Former Smoker     Years: 5.00     Last attempt to quit: 1991     Years since quittin.5    Smokeless tobacco: Never Used   Substance Use Topics    Alcohol use: Yes     Comment: occasional    Drug use: Yes     Frequency: 2.0 times per week     Types: Marijuana     Comment: HAS MEDICAL MARIJUANA , used 19       Recent Surgical History: Surgery of Extremities = 1  Past Surgical History  Past Surgical History:   Procedure Laterality Date    ANKLE SURGERY Right [id number]    RIGHT ANKLE ARTHROTOMY WITH EXPLORATION, REMOVAL OF LOOSE    CARPAL TUNNEL RELEASE Left  CERVICAL SPINE SURGERY      COLONOSCOPY      FOOT SURGERY      bone spur removed right foot    KNEE ARTHROPLASTY Left     NERVE SURGERY      ulnar, bilat.  REVISION TOTAL KNEE ARTHROPLASTY Left 12/17/2019    LEFT TOTAL KNEE DEBRIDEMENT AND POLYETHYLENE EXCHANGE performed by Forest Guzman MD at [address] [place] [address] Bilateral     TOTAL KNEE ARTHROPLASTY Left 2/25/2020    INCISION AND DRAINAGE, CULTURE AND CLOSURE LEFT TOTAL KNEE ARTHROPLASTY WOUND,  POLY EXCHANGE performed by Forest Guzman MD [address]       Level of Consciousness: Alert, Oriented, and Cooperative = 0    Level of Activity: Mostly sedentary, minimal walking = 2    Respiratory Pattern: Regular Pattern; RR 8-20 = 0    Breath Sounds: Clear = 0    Sputum   ,  ,    Cough: Strong, spontaneous, non-productive = 0    Vital Signs   BP (!) 150/79   Pulse 79   Temp 97.9 °F (36.6 °C)   Resp 18   Ht 6' 1\" (1.854 m)   Wt 203 lb (92.1 kg)   SpO2 97%   BMI 26.78 kg/m²   SPO2 (COPD values may differ): Greater than or equal to 92% on room air = 0    Peak Flow (asthma only): not applicable = 0    RSI: 0-4 = See once and convert to home regimen or discontinue        Plan       Goals: medication delivery    Patient/caregiver was educated on the proper method of use for Respiratory Care Devices:  Yes      Level of patient/caregiver understanding able to:   ? Verbalize understanding   ? Demonstrate understanding       ? Teach back        ? Needs reinforcement       ? No available caregiver               ? Other:     Response to education:  Good     Is patient being placed on Home Treatment Regimen? Yes     Does the patient have everything they need prior to discharge? Yes     Comments: pt assessed, chart reviewed, no distress noted    Plan of Care: brovona BID, pulmicort BID    Electronically signed by Leland Chowdhury RCP on 2/25/2020 at 5:06 PM    Respiratory Protocol Guidelines     1.  Assessment and treatment by Respiratory Therapy will be initiated for medication and therapeutic interventions upon initiation of aerosolized medication. 2. Physician will be contacted for respiratory rate (RR) greater than 35 breaths per minute. Therapy will be held for heart rate (HR) greater than 140 beats per minute, pending direction from physician. 3. Bronchodilators will be administered via Metered Dose Inhaler (MDI) with spacer when the following criteria are met:  a. Alert and cooperative     b. HR < 140 bpm  c. RR < 30 bpm                d. Can demonstrate a 2-3 second inspiratory hold  4. Bronchodilators will be administered via Hand Held Nebulizer [facility]) to patients when ANY of the following criteria are met  a. Incognizant or uncooperative          b. Patients treated with HHN at Home        c. Unable to demonstrate proper use of MDI with spacer     d. RR > 30 bpm   5. Bronchodilators will be delivered via Metered Dose Inhaler (MDI), HHN, Aerogen to intubated patients on mechanical ventilation. 6. Inhalation medication orders will be delivered and/or substituted as outlined below. Aerosolized Medications Ordering and Administration Guidelines:    1. All Medications will be ordered by a physician, and their frequency and/or modality will be adjusted as defined by the patients Respiratory Severity Index (RSI) score. 2. If the patient does not have documented COPD, consider discontinuing anticholinergics when RSI is less than 9.  3. If the bronchospasm worsens (increased RSI), then the bronchodilator frequency can be increased to a maximum of every 4 hours. If greater than every 4 hours is required, the physician will be contacted. 4. If the bronchospasm improves, the frequency of the bronchodilator can be decreased, based on the patient's RSI, but not less than home treatment regimen frequency.   5. Bronchodilator(s) will be discontinued if patient has a RSI less than 9 and has received no scheduled or as needed treatment for 72 Hrs.    Patients Ordered on a Mucolytic Agent:    1. Must always be administered with a bronchodilator. 2. Discontinue if patient experiences worsened bronchospasm, or secretions have lessened to the point that the patient is able to clear them with a cough. Anti-inflammatory and Combination Medications:    1. If the patient lacks prior history of lung disease, is not using inhaled anti-inflammatory medication at home, and lacks wheezing by examination or by history for at least 24 hours, contact physician for possible discontinuation.

## 2020-02-25 NOTE — PROCEDURES
PICC   PROCEDURE   NOTE  Chart reviewed for allergies, diagnosis, labs, known contraindications, reason for line placement and planned length of treatment. Informed consent noted to be signed and on chart. Insertion procedure discussed with patient/family member. Three patient identifiers - Patient name,   and MRN -  completed &  confirmed verbally. Time out performed Hat, mask and eye shield donned. PICC site scrubbed with Chloraprep  One-Step applicator for 30 seconds x 1. Hand Hygiene  performed with 3% Chlorhexidine surgical scrub x1 min prior to  Sterile gloves, sterile gown being donned. Patient draped using maximal sterile barrier technique ( head to toe ). PICC site scrubbed a 2nd time with Chloraprep One-Step applicator x 30 sec. Modified Seldinger  technique/ultrasound assisted and tip locating system utilized for insertion. 1% Lidocaine 5 ml injected interdermally pre-insertion. PICC tip location in the SVC confirmed by ECG technology Sherlock 3CG. Positive brisk blood return obtained from all lumens  and each lumen flushed with 10 mls  0.9% Sterile Sodium Chloride. All lumens flush easily with no resistance. Valve placed on all lumens followed by Alcohol Swab Caps on end of each. Bio-patch in place. Catheter secured with non-sutured locking device per hospital protocol. Sterile Tegaderm applied over PICC site. Sterile field maintained during procedure. Guide wire and positioning wire accounted for post procedure and disposed of in sharps: YES  Appearance of site: Clean dry and intact without bleeding or edema. All edges of Tegaderm occlusive. Site marked with date and initials of RN placing line. Teaching/brochures given to pt/family. Bed placed in low position post-procedure. Top 2 side rails in up position call button in reach. Pt. Response to procedure tolerated well. RN notified of all of the above.

## 2020-02-25 NOTE — PROGRESS NOTES
Received in room 5510 from PACU. Alert and complaining of pain. Bed alarm on, will cont to monitor for safety and comfort.

## 2020-02-25 NOTE — H&P
Neetu Reggie    [id number]    [facility] Same Day Surgery Update H & P  Department of General Surgery   Surgical Service   Pre-operative History and Physical  Last H & P within the last 30 days. DIAGNOSIS:   Status post total left knee replacement [Z96.652]    PROCEDURE:  UT TOTAL KNEE ARTHROPLASTY [85897] (INCISION AND DRAINAGE, CULTURE AND CLOSURE LEFT TOTAL KNEE ARTHROPLASTY WOUND, POSSIBLE POLY EXCHANGE)      HISTORY OF PRESENT ILLNESS:   Patient with hx of left TKA and subsequent polyethylene liner exchange for instability approximately 1.5 years later. The patient improved intitally but had acute swelling and limited ROM. Imaging demonstrated failure of patella implant and the patient underwent 1. Left knee arthrotomy with extensive debridement and culture. 2. Removal of displaced patellar component including removal of residual cement in the patella. 3. Reconstruction of failed extensor mechanism/repair at an outside hospital on 2/13/20. He has had drainage from his incision and presents today for the above procedure.          Past Medical History:        Diagnosis Date    Acute renal failure ([facility] [address] 9/28/2012    Ankle disorder 7/2013    impingement, right    Arthritis     Asthma     asthma     [facility]     Diabetes mellitus ([facility] [address] 8/19/2011    GERD (gastroesophageal reflux disease)     GERD (gastroesophageal reflux disease)     Heart murmur     History of gastritis     Hypertension     Pancreatitis     peripheral neuropathy     both legs    Spinal stenosis     spinal stenosis     spinal stenosis, DDD    Ulcer      Past Surgical History:        Procedure Laterality Date    ANKLE SURGERY Right [id number]    RIGHT ANKLE ARTHROTOMY WITH EXPLORATION, REMOVAL OF LOOSE    CARPAL TUNNEL RELEASE Left     CERVICAL SPINE SURGERY      COLONOSCOPY      FOOT SURGERY      bone spur removed right foot    KNEE ARTHROPLASTY Left     NERVE SURGERY ulnar, bilat.  REVISION TOTAL KNEE ARTHROPLASTY Left 2019    LEFT TOTAL KNEE DEBRIDEMENT AND POLYETHYLENE EXCHANGE performed by Geneva Echavarria MD at [address] [place] [address] Bilateral      Past Social History:  Social History     Socioeconomic History    Marital status:      Spouse name: None    Number of children: None    Years of education: None    Highest education level: None   Occupational History    None   Social Needs    Financial resource strain: None    Food insecurity:     Worry: None     Inability: None    Transportation needs:     Medical: None     Non-medical: None   Tobacco Use    Smoking status: Former Smoker     Years: 5.00     Last attempt to quit: 1991     Years since quittin.5    Smokeless tobacco: Never Used   Substance and Sexual Activity    Alcohol use: Yes     Comment: occasional    Drug use: Yes     Frequency: 2.0 times per week     Types: Marijuana     Comment: HAS MEDICAL MARIJUANA , used 19    Sexual activity: None   Lifestyle    Physical activity:     Days per week: None     Minutes per session: None    Stress: None   Relationships    Social connections:     Talks on phone: None     Gets together: None     Attends Druze service: None     Active member of club or organization: None     Attends meetings of clubs or organizations: None     Relationship status: None    Intimate partner violence:     Fear of current or ex partner: None     Emotionally abused: None     Physically abused: None     Forced sexual activity: None   Other Topics Concern    None   Social History Narrative    None         Medications Prior to Admission:      Prior to Admission medications    Medication Sig Start Date End Date Taking? Authorizing Provider   oxyCODONE-acetaminophen (PERCOCET)  MG per tablet Take 1 tablet by mouth every 4 hours as needed for Pain.    Yes Historical Provider, MD   mometasone-formoterol [facility]) 200-5 MCG/ACT inhaler Inhale 2 puffs into the lungs daily   Yes Historical Provider, MD   tamsulosin (FLOMAX) 0.4 MG capsule Take 0.4 mg by mouth daily   Yes Historical Provider, MD   amLODIPine-valsartan-HCTZ -25 MG TABS Take by mouth daily   Yes Historical Provider, MD   pravastatin (PRAVACHOL) 20 MG tablet Take 20 mg by mouth daily   Yes Historical Provider, MD   LINZESS 290 MCG CAPS capsule every morning (before breakfast)  5/23/17  Yes Historical Provider, MD   venlafaxine (EFFEXOR XR) 150 MG extended release capsule daily  4/26/17  Yes Historical Provider, MD   acetaminophen (TYLENOL 8 HOUR ARTHRITIS PAIN) 650 MG extended release tablet Take 650 mg by mouth every 8 hours as needed for Pain    Yes Historical Provider, MD   Loratadine-Pseudoephedrine (CLARITIN-D 24 HOUR PO) Take by mouth   Yes Historical Provider, MD   LORazepam (ATIVAN) 0.5 MG tablet Take 1 tablet by mouth every 8 hours as needed for Anxiety  Patient taking differently: Take 0.5 mg by mouth every 6 hours as needed for Anxiety. 5/27/16  Yes Alex Og MD   gabapentin (NEURONTIN) 300 MG capsule Take 300 mg by mouth 3 times daily. Yes Historical Provider, MD   linagliptin (TRADJENTA) 5 MG tablet Take 5 mg by mouth 9/30/19   Historical Provider, MD   sildenafil (REVATIO) 20 MG tablet Take 20 mg by mouth as needed    Historical Provider, MD         Allergies:  Metformin;  Shellfish-derived products; and Simvastatin    PHYSICAL EXAM:      /80   Pulse 69   Temp 97.5 °F (36.4 °C) (Oral)   Resp 16   Ht 6' 1\" (1.854 m)   Wt 203 lb (92.1 kg)   SpO2 94%   BMI 26.78 kg/m²      Airway:  Airway patent with no audible stridor    Heart:  regular rate and rhythm, No murmur noted    Lungs:  No increased work of breathing, good air exchange, clear to auscultation bilaterally, no crackles or wheezing    Abdomen:  soft, non-distended, non-tender, no rebound tenderness or guarding, normal active bowel sounds and no masses palpated    ASSESSMENT AND PLAN Patient is a 60 y.o. male with above specified procedure planned. 1.  Patient seen and focused exam done today-  last physical exam on 2/4/20. The only change to the patient's H&P are detailed in the HPI above. 2.  Access to ancillary services are available per request of the provider.     EUSEBIO Brian - CNP     2/25/2020

## 2020-02-26 LAB
C-REACTIVE PROTEIN: 9.8 MG/L (ref 0–5.1)
GLUCOSE BLD-MCNC: 107 MG/DL (ref 70–99)
GLUCOSE BLD-MCNC: 149 MG/DL (ref 70–99)
HCT VFR BLD CALC: 32.3 % (ref 40.5–52.5)
HEMOGLOBIN: 10.7 G/DL (ref 13.5–17.5)
PERFORMED ON: ABNORMAL
PERFORMED ON: ABNORMAL
URINE CULTURE, ROUTINE: NORMAL

## 2020-02-26 PROCEDURE — 6360000002 HC RX W HCPCS: Performed by: INTERNAL MEDICINE

## 2020-02-26 PROCEDURE — 99223 1ST HOSP IP/OBS HIGH 75: CPT | Performed by: INTERNAL MEDICINE

## 2020-02-26 PROCEDURE — 97161 PT EVAL LOW COMPLEX 20 MIN: CPT

## 2020-02-26 PROCEDURE — 1200000000 HC SEMI PRIVATE

## 2020-02-26 PROCEDURE — 2580000003 HC RX 258: Performed by: ORTHOPAEDIC SURGERY

## 2020-02-26 PROCEDURE — 94640 AIRWAY INHALATION TREATMENT: CPT

## 2020-02-26 PROCEDURE — 97116 GAIT TRAINING THERAPY: CPT

## 2020-02-26 PROCEDURE — 36592 COLLECT BLOOD FROM PICC: CPT

## 2020-02-26 PROCEDURE — 85014 HEMATOCRIT: CPT

## 2020-02-26 PROCEDURE — 85018 HEMOGLOBIN: CPT

## 2020-02-26 PROCEDURE — 6370000000 HC RX 637 (ALT 250 FOR IP): Performed by: HOSPITALIST

## 2020-02-26 PROCEDURE — 2580000003 HC RX 258: Performed by: INTERNAL MEDICINE

## 2020-02-26 PROCEDURE — 97166 OT EVAL MOD COMPLEX 45 MIN: CPT

## 2020-02-26 PROCEDURE — 6370000000 HC RX 637 (ALT 250 FOR IP): Performed by: ORTHOPAEDIC SURGERY

## 2020-02-26 PROCEDURE — 6360000002 HC RX W HCPCS: Performed by: ORTHOPAEDIC SURGERY

## 2020-02-26 PROCEDURE — 99231 SBSQ HOSP IP/OBS SF/LOW 25: CPT | Performed by: HOSPITALIST

## 2020-02-26 PROCEDURE — 86140 C-REACTIVE PROTEIN: CPT

## 2020-02-26 PROCEDURE — 97110 THERAPEUTIC EXERCISES: CPT

## 2020-02-26 PROCEDURE — 97530 THERAPEUTIC ACTIVITIES: CPT

## 2020-02-26 PROCEDURE — 97535 SELF CARE MNGMENT TRAINING: CPT

## 2020-02-26 RX ORDER — LORAZEPAM 1 MG/1
1 TABLET ORAL EVERY 6 HOURS
Status: DISCONTINUED | OUTPATIENT
Start: 2020-02-26 | End: 2020-02-27 | Stop reason: HOSPADM

## 2020-02-26 RX ORDER — INSULIN LISPRO 100 [IU]/ML
0-3 INJECTION, SOLUTION INTRAVENOUS; SUBCUTANEOUS NIGHTLY
Status: DISCONTINUED | OUTPATIENT
Start: 2020-02-26 | End: 2020-02-27 | Stop reason: HOSPADM

## 2020-02-26 RX ORDER — CETIRIZINE HYDROCHLORIDE 10 MG/1
10 TABLET ORAL DAILY
Status: DISCONTINUED | OUTPATIENT
Start: 2020-02-26 | End: 2020-02-27 | Stop reason: HOSPADM

## 2020-02-26 RX ORDER — INSULIN LISPRO 100 [IU]/ML
0-6 INJECTION, SOLUTION INTRAVENOUS; SUBCUTANEOUS
Status: DISCONTINUED | OUTPATIENT
Start: 2020-02-26 | End: 2020-02-27 | Stop reason: HOSPADM

## 2020-02-26 RX ADMIN — OXYCODONE HYDROCHLORIDE AND ACETAMINOPHEN 2 TABLET: 5; 325 TABLET ORAL at 17:45

## 2020-02-26 RX ADMIN — GABAPENTIN 300 MG: 300 CAPSULE ORAL at 15:00

## 2020-02-26 RX ADMIN — OXYCODONE 10 MG: 5 TABLET ORAL at 15:00

## 2020-02-26 RX ADMIN — AMLODIPINE BESYLATE 10 MG: 10 TABLET ORAL at 09:35

## 2020-02-26 RX ADMIN — GABAPENTIN 300 MG: 300 CAPSULE ORAL at 20:28

## 2020-02-26 RX ADMIN — CETIRIZINE HYDROCHLORIDE 10 MG: 10 TABLET, FILM COATED ORAL at 19:36

## 2020-02-26 RX ADMIN — SODIUM CHLORIDE, POTASSIUM CHLORIDE, SODIUM LACTATE AND CALCIUM CHLORIDE: 600; 310; 30; 20 INJECTION, SOLUTION INTRAVENOUS at 01:08

## 2020-02-26 RX ADMIN — VALSARTAN AND HYDROCHLOROTHIAZIDE 1 TABLET: 320; 25 TABLET, FILM COATED ORAL at 09:34

## 2020-02-26 RX ADMIN — VANCOMYCIN HYDROCHLORIDE 1500 MG: 10 INJECTION, POWDER, LYOPHILIZED, FOR SOLUTION INTRAVENOUS at 18:48

## 2020-02-26 RX ADMIN — VANCOMYCIN HYDROCHLORIDE 1500 MG: 10 INJECTION, POWDER, LYOPHILIZED, FOR SOLUTION INTRAVENOUS at 05:56

## 2020-02-26 RX ADMIN — CEFTRIAXONE SODIUM 2 G: 2 INJECTION, POWDER, FOR SOLUTION INTRAMUSCULAR; INTRAVENOUS at 17:45

## 2020-02-26 RX ADMIN — VENLAFAXINE HYDROCHLORIDE 150 MG: 150 CAPSULE, EXTENDED RELEASE ORAL at 09:35

## 2020-02-26 RX ADMIN — OXYCODONE HYDROCHLORIDE AND ACETAMINOPHEN 2 TABLET: 5; 325 TABLET ORAL at 23:35

## 2020-02-26 RX ADMIN — INSULIN LISPRO 1 UNITS: 100 INJECTION, SOLUTION INTRAVENOUS; SUBCUTANEOUS at 20:29

## 2020-02-26 RX ADMIN — PRAVASTATIN SODIUM 20 MG: 20 TABLET ORAL at 20:28

## 2020-02-26 RX ADMIN — RIVAROXABAN 10 MG: 10 TABLET, FILM COATED ORAL at 17:45

## 2020-02-26 RX ADMIN — LORAZEPAM 1 MG: 1 TABLET ORAL at 19:36

## 2020-02-26 RX ADMIN — Medication 10 ML: at 21:00

## 2020-02-26 RX ADMIN — HYDROMORPHONE HYDROCHLORIDE 0.5 MG: 1 INJECTION, SOLUTION INTRAMUSCULAR; INTRAVENOUS; SUBCUTANEOUS at 10:57

## 2020-02-26 RX ADMIN — LORAZEPAM 1 MG: 1 TABLET ORAL at 12:38

## 2020-02-26 RX ADMIN — Medication 10 ML: at 09:35

## 2020-02-26 RX ADMIN — HYDROMORPHONE HYDROCHLORIDE 0.5 MG: 1 INJECTION, SOLUTION INTRAMUSCULAR; INTRAVENOUS; SUBCUTANEOUS at 03:45

## 2020-02-26 RX ADMIN — OXYCODONE HYDROCHLORIDE AND ACETAMINOPHEN 2 TABLET: 5; 325 TABLET ORAL at 12:35

## 2020-02-26 RX ADMIN — GABAPENTIN 300 MG: 300 CAPSULE ORAL at 09:35

## 2020-02-26 RX ADMIN — OXYCODONE HYDROCHLORIDE AND ACETAMINOPHEN 2 TABLET: 5; 325 TABLET ORAL at 00:12

## 2020-02-26 RX ADMIN — PIOGLITAZONE 15 MG: 15 TABLET ORAL at 09:35

## 2020-02-26 RX ADMIN — OXYCODONE HYDROCHLORIDE AND ACETAMINOPHEN 2 TABLET: 5; 325 TABLET ORAL at 06:12

## 2020-02-26 RX ADMIN — OXYCODONE 10 MG: 5 TABLET ORAL at 19:36

## 2020-02-26 RX ADMIN — TAMSULOSIN HYDROCHLORIDE 0.4 MG: 0.4 CAPSULE ORAL at 09:35

## 2020-02-26 RX ADMIN — OXYCODONE 10 MG: 5 TABLET ORAL at 09:35

## 2020-02-26 RX ADMIN — SENNOSIDES AND DOCUSATE SODIUM 1 TABLET: 8.6; 5 TABLET ORAL at 20:28

## 2020-02-26 RX ADMIN — BUDESONIDE 500 MCG: 0.5 SUSPENSION RESPIRATORY (INHALATION) at 08:11

## 2020-02-26 RX ADMIN — SENNOSIDES AND DOCUSATE SODIUM 1 TABLET: 8.6; 5 TABLET ORAL at 09:34

## 2020-02-26 RX ADMIN — ARFORMOTEROL TARTRATE 15 MCG: 15 SOLUTION RESPIRATORY (INHALATION) at 08:11

## 2020-02-26 ASSESSMENT — PAIN DESCRIPTION - PAIN TYPE
TYPE: SURGICAL PAIN

## 2020-02-26 ASSESSMENT — PAIN DESCRIPTION - PROGRESSION
CLINICAL_PROGRESSION: GRADUALLY WORSENING
CLINICAL_PROGRESSION: GRADUALLY WORSENING
CLINICAL_PROGRESSION: NOT CHANGED
CLINICAL_PROGRESSION: GRADUALLY WORSENING
CLINICAL_PROGRESSION: NOT CHANGED
CLINICAL_PROGRESSION: GRADUALLY WORSENING
CLINICAL_PROGRESSION: NOT CHANGED

## 2020-02-26 ASSESSMENT — PAIN DESCRIPTION - DESCRIPTORS
DESCRIPTORS: ACHING;SHARP
DESCRIPTORS: ACHING;SHARP
DESCRIPTORS: ACHING
DESCRIPTORS: ACHING;SHARP
DESCRIPTORS: ACHING;SHARP
DESCRIPTORS: ACHING
DESCRIPTORS: ACHING;SHARP
DESCRIPTORS: ACHING;SHARP
DESCRIPTORS: ACHING

## 2020-02-26 ASSESSMENT — PAIN - FUNCTIONAL ASSESSMENT
PAIN_FUNCTIONAL_ASSESSMENT: PREVENTS OR INTERFERES SOME ACTIVE ACTIVITIES AND ADLS

## 2020-02-26 ASSESSMENT — PAIN DESCRIPTION - ONSET
ONSET: ON-GOING
ONSET: AWAKENED FROM SLEEP
ONSET: ON-GOING

## 2020-02-26 ASSESSMENT — PAIN DESCRIPTION - FREQUENCY
FREQUENCY: CONTINUOUS

## 2020-02-26 ASSESSMENT — PAIN DESCRIPTION - LOCATION
LOCATION: KNEE

## 2020-02-26 ASSESSMENT — PAIN SCALES - GENERAL
PAINLEVEL_OUTOF10: 5
PAINLEVEL_OUTOF10: 7
PAINLEVEL_OUTOF10: 6
PAINLEVEL_OUTOF10: 2
PAINLEVEL_OUTOF10: 7
PAINLEVEL_OUTOF10: 3
PAINLEVEL_OUTOF10: 5
PAINLEVEL_OUTOF10: 0
PAINLEVEL_OUTOF10: 6
PAINLEVEL_OUTOF10: 4
PAINLEVEL_OUTOF10: 4
PAINLEVEL_OUTOF10: 7

## 2020-02-26 ASSESSMENT — PAIN DESCRIPTION - ORIENTATION
ORIENTATION: LEFT

## 2020-02-26 NOTE — OP NOTE
[facility]                                OPERATIVE REPORT    PATIENT NAME: Oren Petersen                     :        1960  MED REC NO:   [id number]                          ROOM:       5510  ACCOUNT NO:   [de-identified]                           ADMIT DATE: 2020  PROVIDER:     Radha Zamora MD    DATE OF PROCEDURE:  2020    SURGEON:  Radha Zamora MD    PREOPERATIVE DIAGNOSIS:  Infected left total knee replacement. POSTOPERATIVE DIAGNOSIS:  Infected left total knee replacement. OPERATIVE PROCEDURE:  Revision left total knee replacement with  polyethylene exchange including extensive excisional debridement,  irrigation, culture, and closure. ANESTHESIA:  General with regional block. BLOOD LOSS:  200 mL. DRAINS:  Hemovac. SPECIMENS:  Multiple intraarticular cultures sent for aerobic,  anaerobic, and fungal cultures. COMPLICATIONS:  None. INDICATIONS FOR SURGERY:  The patient is a complicated 26-SCYF-DZV male  who about a year ago underwent a total knee replacement. The patient's  postoperative course was complicated by instability and recurrent  effusions. He had undergone extensive evaluation with multiple  aspirations and cultures to rule out infection. The patient  subsequently was brought back to the operating room approximately three  months ago, where he had revision knee replacement for instability. This was addressed by changing his polyethylene. The patient did very  well until approximately three to four weeks ago. During therapy, he  had a complete dislocation and dissociation of his patellar component. This required revision about a week ago and from that point, he has had  drainage. The drainage seems to be coming from the joint and failure of  his capsular repair to remain stable.   Aspirations of this fluid were  sent for alpha defensin and this was positive on two occasions over the  last five days. No cultures were positive. He was on Keflex and did  receive prophylactic antibiotics today. The patient shows no signs or symptoms of infection, his knee looks  benign. The fluid drainage is a serosanguineous fluid consistent with  synovial type fluid. This drainage is without a fistula. This seems to  be due to poor healing and disruption of scarred capsular tissue. The patient is now being taken to the operating room for polyethylene  exchange, extensive debridement, culture, and closure. The patient did  receive a PICC line before surgery in preparation for treatment as if  this were an early infection. Aggressive treatment is felt to be  appropriate. This has been discussed with Dr. Moe Asif in Infectious  Disease. DETAILS OF THE PROCEDURE:  The patient was seen and cleared by  Anesthesia. He came in early for PICC line placement. This was  uncomplicated. The patient then received a preoperative regional block  per Anesthesia. The patient was taken to the operating theatre, placed in the supine  position. General endotracheal intubation was provided. The timeout  procedure was completed per protocol. The correct operative extremity  was marked and identified several times including during the timeout  procedure. The knee was then addressed by placing tourniquet on the left leg. Staples were removed. The knee was then double prepped including an  alcohol prep. The wound remained sealed. There was a small scant amount of drainage  from one of the staples and this appeared to be very watery  serosanguineous material.    Attention was then directed to the left knee. The tourniquet was  placed, but not elevated. Total blood loss during the procedure was 200  mL. The previous incision was then opened with a #15 blade. Subcutaneous  Vicryl suture that remained was excised, underwent excisional  debridement.   The subcutaneous layers were poorly healed and there was  clearly subcutaneous fluid. This appeared to be coming from a large  poorly healed defect where the sutures had pulled through just medial to  the patella. The capsular tissue was frayed. There was clear  disruption of the capsular repair. Self-retaining retractors were then applied and a systematic excisional  debridement of the subcutaneous and suprafascial level was performed. Old suture was removed with sharp dissection. This then allowed easy  access into the joint. The margins of the previous median parapatellar  arthrotomy were identified. Excisional debridement was performed of any  devitalized tissue. The vastus medialis obliquus was intact. This  appeared to be thinning and poor quality tissue predominantly with  extensive rigid scarring involving the medial portion of the capsule. At this point in time, the knee was further examined. Self-retaining  retractors were adjusted. Curettage was performed of the residual  patellar bone. The polyethylene spacer, which was a Journey II CR  component, which was a deep-dish variety was then easily removed. This  allowed for systematic excisional debridement of the suprapatellar  periprosthetic medial and lateral gutters. Once the polyethylene was  extracted, the posterior aspect of the knee could be debrided somewhat. Bleeding was controlled with unipolar electrocautery and Aquamantys. The knee was irrigated with 9 liters of pulsatile lavage and  approximately 900 mL of IrriSept.  Vancomycin 2 gm were placed in the  knee along with a medium Hemovac drain. The Hemovac drain was passed out through a separate stab incision in the  superolateral aspect of the knee joint. This is a separate area away  from the knee. The drain was secured to the skin with a Vicryl suture  and activated a closed system drainage. Minimal drainage was noted.     Capsular repair was then performed with #2 Vicryl and several running  locking #1 Stratafix. These were monofilament and this was used in part  to oversew this repair where the tissue was poor. In extension and  approximately up to 30 degrees of flexion, this repair was adequate. However, the tissue was very poor. There was extensive stiffening of  the tissue from scarring, but overall there was minimal evidence of  infection. This was most likely a contaminant and it was not clear that  there was any disruption of tissue that would not be expected because of  the fluid draining into the subcutaneous level from the joint or from  the disruption of the repair. Total operative time was about an hour and a half. The remaining  portion of the wound was closed in layers using 0 and 2-0 Vicryl for the  deep and superficial subcutaneous layer and staples with Prineo for the  skin edges. A Mepilex dressing along with a modified Garcia dressing was  applied. Blood loss was about 200 mL. No blood was replaced. The patient did receive prophylactic antibiotics. The knee did not  appear to be infected. Based on the alpha defensin, suspicion for a  contaminant was present, but not fully documented. Multiple tissue  cultures were obtained around the residual prosthesis from the back of  the knee in medial and lateral gutters. These were all submitted  separately and will be kept for culture of aerobic, anaerobic, and  fungal evaluation and modified per Infectious Disease. The patient will be started on vancomycin. The PICC line will be  utilized and Infectious Disease consult will be placed. In summary, this patient underwent a second look polyethylene exchange  of what appeared to be a possible acute contamination of total knee  revision. The patellar surface did not show any evidence of infection.    This was thoroughly curettaged and debrided and excisional debridement  of the subcutaneous, suprafascial, and intraarticular portions of the  left knee were performed. The tourniquet was not utilized. Blood loss  was about 200 mL. Specimens were obtained. The polyethylene was  removed and the polyethylene was reinserted using a size 5/6, 12 mm  deep-dish polyethylene from the Axcient CR left implant system. This  was easily reinserted. The knee could fully extend and prior to the  repair, the knee could be flexed to about 110 degrees with soft  endpoint. There was no attempt to try to bend the knee more than about  20 degrees after capsular repair because of the quality of the repair  and the quality of the tissue. Sponge, needle and instrument counts  were reported as correct x2.         Live Bang MD    D: 02/25/2020 14:13:59       T: 02/25/2020 15:32:10     [email or web address]  Job#: [id number]     Doc#: [id number]    CC:  MD Harjit Harding MD

## 2020-02-26 NOTE — PROGRESS NOTES
Pt is alert and oriented. Ace wrap is CD&I. Immobilizer in place. Pt voiding per urinal. VSS. Pt requests oral and IV pain medication PRN.

## 2020-02-26 NOTE — PROGRESS NOTES
Clinical Pharmacy Consult Note    Admit date: 2/25/2020    Interval Update: Remains afebrile. Urine and all wound cultures taken in OR yesterday showing no growth at this time. Confirmed with  that vancomycin is to continue while awaiting ID recs. Subjective/Objective:  Patient is a  60 yom s/p incision and drainage, culture and closure left total knee arthroplasty wound, poly exchange. Patient had reconstruction of failed extensor mechanism/repair at an outside hospital on 2/13/20. Prior to today's procedure patient had drainage from his incision site. PMH significant for HTN and COPD. Pharmacy is consulted to dose Vancomycin per Dr. Aaron Butt. Pertinent Medications:  Cefazolin 2g (2/25, once pre-op)  Vancomycin - pharmacy to dose - day #2  · Vancomycin 2g powder (to wound at closing)  · Vancomycin 1.5g q12h (2/25-current )    PERTINENT LABS:  Recent Labs     02/25/20  0802      K 3.7   CL 98*   CO2 25   BUN 16   CREATININE 0.9       Estimated Creatinine Clearance: 99 mL/min (based on SCr of 0.9 mg/dL). Recent Labs     02/25/20  0802 02/26/20  0616   WBC 6.3  --    HGB 11.9* 10.7*   HCT 35.9* 32.3*   MCV 93.3  --      --        Height:  6' 1\" (185.4 cm)  Weight: 203 lb (92.1 kg)    Micro:  Date Site Micro Susceptibility   2/25 Wound     2.25  Urine               Assessment/Plan:  1. Osteomyelitis/prosthetic joint infection  :  vancomycin day #2  Vancomycin  · 1500 mg IV q12h started last korey. Will continue this dosing for now  · Trough ordered tomorrow am before 4th dose. · BMP ordered x1 tomorrow to assess renal function on vancomycin  · Pt received 2g vancomycin powder added to wound at surgery closure yesterday. As drain was placed, likely will have minimal effect on levels. · Renal function will be monitored closely and dosing will be adjusted as appropriate. Please call with any questions. Thank you for consulting pharmacy!   Anali DickersonD, [facility] 2/26/2020 1:49 PM

## 2020-02-26 NOTE — CARE COORDINATION
Referral to [facility] to review benefits.      Electronically signed by Angelia Weir RN on 2/26/2020 at 10:00 AM  [phone]

## 2020-02-26 NOTE — PROGRESS NOTES
Occupational Therapy   Occupational Therapy Initial Assessment, Treatment and D/c Note   Date: 2020   Patient Name: Elizabeth Najera  MRN: [id number]     : 1960    Date of Service: 2020    Discharge Recommendations:Serge Hillman He scored a 22/24 on the [facility] ADL Inpatient form. At this time, no further OT is recommended upon discharge. Recommend patient returns to prior setting with prior services. OT Equipment Recommendations  Equipment Needed: Yes  Mobility Devices: ADL Assistive Devices  ADL Assistive Devices: Toileting - Raised Toilet Seat with arms    Assessment   Performance deficits / Impairments: Decreased functional mobility   Assessment: Pt from home with wife  - familiar with postoperative tech. Pt has had 4 sx on L knee in last year. Pt demo mod independence with functional mobility / transfers and MIn A with LE ADLs. Pt denies ongoing OT services, has AE- reacher / socks aid  and desires to obtain RTS with arm at d/c. Will sign off from OT services. Pt in agreement  Decision Making: Medium Complexity  OT Education: OT Role;Plan of Care;IADL Safety; ADL Adaptive Strategies  Patient Education: verb understanding   No Skilled OT: Safe to return home; No OT goals identified  REQUIRES OT FOLLOW UP: No  Activity Tolerance  Activity Tolerance: Patient Tolerated treatment well  Activity Tolerance: No BILL noted   Safety Devices  Safety Devices in place: Yes  Type of devices: Bed alarm in place;Call light within reach;Nurse notified; Left in bed           Patient Diagnosis(es): The encounter diagnosis was Status post total left knee replacement.      has a past medical history of Acute renal failure ([facility] [address] Ankle disorder, Arthritis, Asthma, asthma, COPD (chronic obstructive pulmonary disease) ([facility] [address] Diabetes mellitus ([facility] [address] GERD (gastroesophageal reflux disease), GERD (gastroesophageal reflux disease), Heart murmur, History of gastritis, Hypertension, Pancreatitis, peripheral neuropathy, Spinal stenosis, spinal stenosis, and Ulcer. has a past surgical history that includes Rotator cuff repair (Bilateral); Cervical spine surgery; Carpal tunnel release (Left); Nerve Surgery; Colonoscopy; Ankle surgery (Right, [id number]); Foot surgery; Knee Arthroplasty (Left); Revision total knee arthroplasty (Left, 12/17/2019); and Total knee arthroplasty (Left, 2/25/2020). Restrictions  Position Activity Restriction  Other position/activity restrictions: WBAT left LE, no left knee flexion, brace locked in extension    Subjective   General  Chart Reviewed: Yes  Patient assessed for rehabilitation services?: Yes  Additional Pertinent Hx: Admit 2/25 s/p INCISION AND DRAINAGE, CULTURE AND CLOSURE LEFT TOTAL KNEE ARTHROPLASTY WOUND,  POLY EXCHANGE                    PMHX: DM,COPD,OA,GERD,Spinal Stenosis, ARF and Multiple L knee sx   Family / Caregiver Present: No  Diagnosis: s/p INCISION AND DRAINAGE, CULTURE AND CLOSURE LEFT TOTAL KNEE ARTHROPLASTY WOUND,  POLY EXCHANGE  on 2/25   Subjective  Subjective: \" I feel pretty good \" pt found up in chair - agreeable for OT eval and tx. Pain Assessment  Pain Assessment: 0-10  Pain Level: 5 - RN aware.        Social/Functional History  Social/Functional History  Lives With: Spouse  Type of Home: House  Home Layout: Two level, Bed/Bath upstairs, 1/2 bath on main level  Home Access: Stairs to enter with rails  Entrance Stairs - Number of Steps: 2  Entrance Stairs - Rails: Both  Bathroom Shower/Tub: Tub/Shower unit, Walk-in shower  Bathroom Toilet: Standard(next to sink)  Bathroom Equipment: Shower chair  Home Equipment: Rolling walker, 4 wheeled walker, Cane, Quad cane  Receives Help From: Family  ADL Assistance: Independent  Homemaking Assistance: Independent  Homemaking Responsibilities: (wife can do all)  Ambulation Assistance: Independent(no device in house, cane outside)  Transfer Assistance: Independent  Active : Yes  Occupation: Retired  Additional Comments: pt reports no recent falls. wife & son can assist as needed. Objective  Treatment included functional transfer training, ADL's and pt. education. Orientation  Overall Orientation Status: Within Normal Limits     Balance  Sitting Balance: Independent  Standing Balance: Modified independent (with walker )  Standing Balance  Time:  6 mins x 1 and 3 mins x 2   Activity: functional transfers / stance at sink and functional mobility in room / bathroom   Functional Mobility  Functional - Mobility Device: Rolling Walker  Activity: To/from bathroom; Other  Assist Level: Modified independent   Functional Mobility Comments: no LOB noted   Toilet Transfers  Toilet - Technique: Ambulating  Equipment Used: Raised toilet seat with rails  Toilet Transfer: Modified independent  Toilet Transfers Comments: Pt interested in toilet insert with arms. Shower Transfers  Shower Transfers Comments: Pt verb safe step into shower tech for transfers at home - deferred to MD to clarify when pt is ready to shower 2/2 locked brace. ADL  Equipment Provided: Sock aid;Reacher  Feeding: Independent  Grooming: Independent;Setup  LE Dressing: Contact guard assistance;Minimal assistance(with shorts over locked knee brace. Mod /Max with socks Jesu Salamanca - pt reports uses sockaid at home )  Toileting: Setup;Modified independent (using rail for steadying assist )  Additional Comments: Pt reports wife available to assist as needed.    Tone RUE  RUE Tone: Normotonic  Tone LUE  LUE Tone: Normotonic  Coordination  Movements Are Fluid And Coordinated: Yes        Transfers  Sit to stand: Modified independent  Stand to sit: Modified independent  Vision - Basic Assessment  Prior Vision: No visual deficits  Cognition  Overall Cognitive Status: WNL    LUE AROM (degrees)  LUE AROM : WNL  Left Hand AROM (degrees)  Left Hand AROM: WNL  RUE AROM (degrees)  RUE AROM : WNL  Right Hand AROM (degrees)  Right Hand AROM: WNL  LUE Strength  Gross LUE Strength: WFL  L Hand General: 4+/5  RUE Strength  Gross RUE Strength: WFL  R Hand General: 4+/5     Hand Dominance  Hand Dominance: Right     Plan D/c Acute OT services.       AM-PAC Score  AM-PAC Inpatient Daily Activity Raw Score: 22 (02/26/20 1428)  AM-PAC Inpatient ADL T-Scale Score : 47.1 (02/26/20 1428)  ADL Inpatient CMS 0-100% Score: 25.8 (02/26/20 1428)  ADL Inpatient CMS G-Code Modifier : CJ (02/26/20 1428)    Therapy Time   Individual Concurrent Group Co-treatment   Time In 1352         Time Out 1431         Minutes 39            Timed Code Treatment Minutes:   23 mins     Total Treatment Minutes:  39 mins       Melita Alaniz OT

## 2020-02-26 NOTE — PROGRESS NOTES
Surgery Post-Op Day #1 Note  Srege Cedillo  SUBJECTIVE:  Knee pain moderate - severe    OBJECTIVE:  Infusions:   lactated ringers 125 mL/hr at 02/26/20 0108       I/O:I/O last 3 completed shifts: In: 1240 [P.O.:220; I.V.:1020]  Out: 1075 [Urine:850; Drains:25; Blood:200]           Wt Readings from Last 1 Encounters:   02/25/20 203 lb (92.1 kg)        LABS:    Recent Labs     02/25/20  0802 02/26/20  0616   WBC 6.3  --    HGB 11.9* 10.7*   HCT 35.9* 32.3*   MCV 93.3  --      --         Recent Labs     02/25/20  0802      K 3.7   CL 98*   CO2 25   BUN 16   CREATININE 0.9      No results for input(s): AST, ALT, ALB, BILIDIR, BILITOT, ALKPHOS in the last 72 hours. No results for input(s): LIPASE, AMYLASE in the last 72 hours. Recent Labs     02/25/20  0802   INR 1.09      No results for input(s): CKTOTAL, CKMB, CKMBINDEX, TROPONINI in the last 72 hours. Exam:BP (!) 146/74   Pulse 71   Temp 97.8 °F (36.6 °C) (Oral)   Resp 16   Ht 6' 1\" (1.854 m)   Wt 203 lb (92.1 kg)   SpO2 95%   BMI 26.78 kg/m²    General appearance: alert, appears stated age and cooperative    LABS: pending and will review  I have reviewed all the lab results. There are some abnormalities that are not critical to the patient's health, but I would like to discuss these with nursing and pateint    ASSESSMENT/PLAN:   Pt. is a 60 y.o. male s/p LTKR    Mobility: fair    Diet:  Per preop as tolerated. Antibiotics discontinued Per SCIP    Melendez Catheter:  remove      DVT prophylaxis: Intermittent compression devices and  Xarelto per protocol    GI Prophylaxis: per medicine    Beta Blocker:  should be considered, specific regimen per anesthesia  Wound - dressing dry and intact    N/V exam intact    Progress treatment plan with meds and PT/OT    D/C plans discussed. Oh exam bilaterally negative. Wound stable to date    Discussed with nursing and chart reviewed.     Xrays:deferrred    Disposition: IV therapy    Will get results to Huyen Chopra MD 2/26/2020 6:42 AM

## 2020-02-26 NOTE — CONSULTS
Internal Medicine Consult Note    Subjective:     Reason for consult: Post-operative management of medical problems  Requesting physician: Alia Matute, orthopedic surgery    Hospital Day: 2  Admit Date: 2/25/2020  PCP: Brook Andrade MD                                Code:Full Code    HPI: Jenni Martinez is a 60 y.o. male with a significant past medical history of diabetes mellitus type 2, COPD/asthma, essential hypertension who is seen in consult after undergoing a revision of left total knee arthroplasty. This was done due to worsening pain, stiffness, limited range of motion, and after failing medical management. The procedure was performed on 02/25/2020 with no reported complications, estimated 200 mL of blood loss occurred during the procedure. Per Dr. Alfredito Gamez note: The patient is a complicated [id number]-ORP male  who about a year ago underwent a total knee replacement. The patient's  postoperative course was complicated by instability and recurrent  effusions. He had undergone extensive evaluation with multiple  aspirations and cultures to rule out infection. The patient  subsequently was brought back to the operating room approximately three  months ago, where he had revision knee replacement for instability. This was addressed by changing his polyethylene. The patient did very  well until approximately three to four weeks ago. During therapy, he  had a complete dislocation and dissociation of his patellar component. This required revision about a week ago and from that point, he has had  drainage. The drainage seems to be coming from the joint and failure of  his capsular repair to remain stable. Aspirations of this fluid were  sent for alpha defensin and this was positive on two occasions over the  last five days. No cultures were positive. He was on Keflex and did  receive prophylactic antibiotics today.     Today the patient reports left knee pain which he grades 7 out of 10 in intensity. He reports that pain is fairly well controlled. He does not report chest pain/shortness of breath. No nausea, no vomiting, no diarrhea. No dysuria/urinary retention. Diet: DIET GENERAL;  Dietary Nutrition Supplements: Wound Healing Oral Supplement                     Review of Systems: 12 systems reviewed and are negative except as mentioned per HPI. Accucheck Glucoses:   Recent Labs     02/25/20  0806 02/25/20  1333 02/26/20  1620   POCGLU 109* 106* 107*                                                                   Past Medical History:   Diagnosis Date    Acute renal failure (HCC) 9/28/2012    Ankle disorder 7/2013    impingement, right    Arthritis     Asthma     asthma     [facility]) ([facility]     Diabetes mellitus ([facility] [address] 8/19/2011    GERD (gastroesophageal reflux disease)     GERD (gastroesophageal reflux disease)     Heart murmur     History of gastritis     Hypertension     Pancreatitis     peripheral neuropathy     both legs    Spinal stenosis     spinal stenosis     spinal stenosis, DDD    Ulcer        Past Surgical History:   Procedure Laterality Date    ANKLE SURGERY Right [id number]    RIGHT ANKLE ARTHROTOMY WITH EXPLORATION, REMOVAL OF LOOSE    CARPAL TUNNEL RELEASE Left     CERVICAL SPINE SURGERY      COLONOSCOPY      FOOT SURGERY      bone spur removed right foot    KNEE ARTHROPLASTY Left     NERVE SURGERY      ulnar, bilat.     REVISION TOTAL KNEE ARTHROPLASTY Left 12/17/2019    LEFT TOTAL KNEE DEBRIDEMENT AND POLYETHYLENE EXCHANGE performed by Reynold Moore MD at [address] [place] [address] Bilateral     TOTAL KNEE ARTHROPLASTY Left 2/25/2020    INCISION AND DRAINAGE, CULTURE AND CLOSURE LEFT TOTAL KNEE ARTHROPLASTY WOUND,  POLY EXCHANGE performed by Reynold Moore MD at [place] [address] Marital status:      Spouse name: Not on file    Number of children: Not on file    Years of education: Not on file    Highest education level: Not on file   Occupational History    Not on file   Social Needs    Financial resource strain: Not on file    Food insecurity:     Worry: Not on file     Inability: Not on file    Transportation needs:     Medical: Not on file     Non-medical: Not on file   Tobacco Use    Smoking status: Former Smoker     Years: 5.00     Last attempt to quit: 1991     Years since quittin.5    Smokeless tobacco: Never Used   Substance and Sexual Activity    Alcohol use: Yes     Comment: occasional    Drug use: Yes     Frequency: 2.0 times per week     Types: Marijuana     Comment: HAS MEDICAL MARIJUANA , used 19    Sexual activity: Not on file   Lifestyle    Physical activity:     Days per week: Not on file     Minutes per session: Not on file    Stress: Not on file   Relationships    Social connections:     Talks on phone: Not on file     Gets together: Not on file     Attends Jew service: Not on file     Active member of club or organization: Not on file     Attends meetings of clubs or organizations: Not on file     Relationship status: Not on file    Intimate partner violence:     Fear of current or ex partner: Not on file     Emotionally abused: Not on file     Physically abused: Not on file     Forced sexual activity: Not on file   Other Topics Concern    Not on file   Social History Narrative    Not on file       Family History   Problem Relation Age of Onset    Arthritis Mother     Stroke Father     Diabetes Maternal Aunt     Diabetes Maternal Uncle     Diabetes Maternal Grandfather                                                       Meds:     Scheduled Meds:   LORazepam  1 mg Oral Q6H    insulin lispro  0-6 Units Subcutaneous TID WC    insulin lispro  0-3 Units Subcutaneous Nightly    cefTRIAXone (ROCEPHIN) IV  2 g Intravenous Q24H    gabapentin  300 mg Oral TID    linaclotide  290 mcg Oral QAM AC    loratadine-pseudoephedrine  1 tablet Oral Daily    pioglitazone  15 mg Oral Daily    pravastatin  20 mg Oral Nightly    tamsulosin  0.4 mg Oral Daily    venlafaxine  150 mg Oral Daily    sodium chloride flush  10 mL Intravenous 2 times per day    sennosides-docusate sodium  1 tablet Oral BID    rivaroxaban  10 mg Oral Daily    oxyCODONE-acetaminophen  2 tablet Oral 4 times per day    amLODIPine  10 mg Oral Daily    And    valsartan-hydrochlorothiazide  1 tablet Oral Daily    budesonide  0.5 mg Nebulization BID    And    Arformoterol Tartrate  15 mcg Nebulization BID    vancomycin (VANCOCIN) IV  1,500 mg Intravenous Q12H      Continuous Infusions:   lactated ringers 125 mL/hr at 02/26/20 0108     PRN Meds:sodium chloride flush, magnesium hydroxide, promethazine, ondansetron, oxyCODONE **OR** oxyCODONE, HYDROmorphone **OR** HYDROmorphone    Allergies: Allergies   Allergen Reactions    Metformin Other (See Comments)     GI upset    Shellfish-Derived Products Hives    Simvastatin Other (See Comments)     GI upset       OBJECTIVE:   VS: /76   Pulse 72   Temp 98.2 °F (36.8 °C) (Oral)   Resp 16   Ht 6' 1\" (1.854 m)   Wt 203 lb (92.1 kg)   SpO2 96%   BMI 26.78 kg/m²   Wt Readings from Last 3 Encounters:   02/25/20 203 lb (92.1 kg)   12/17/19 206 lb (93.4 kg)   12/12/19 213 lb (96.6 kg)     Body mass index is 26.78 kg/m². I/O:      Intake/Output Summary (Last 24 hours) at 2/26/2020 1831  Last data filed at 2/26/2020 1554  Gross per 24 hour   Intake 600 ml   Output 1580 ml   Net -980 ml     I/O this shift:  In: 240 [P.O.:240]  Out: -   I/O last 3 completed shifts:   In: 65 [P.O.:460; I.V.:220]  Out: 1980 [Urine:1825; Drains:155]    General appearance: Lying in bed in no acute distress, appears stated age, cooperative  HEENT Normal cephalic, atraumatic, EOMI, PERRL, no LAD, no oropharyngeal erythema  Lungs: CTAB, no wheezes, rhonchi or rales  Heart: RRR +S1/S2 without murmurs, rubs or gallops  Abdomen: Soft, NTND, without rigidity or guarding, normal active bowel sounds  Extremities: no edema. + Surgical dressing over left knee. Hemovac in place. There is serous sanguinous discharge from left knee joint. Skin: no rashes  Neurologic: AAOx4, CNs II-XII grossly intact, moving all extremities spontaneously    LABS:  CBC:   Recent Labs     02/25/20  0802 02/26/20  0616   WBC 6.3  --    HGB 11.9* 10.7*   HCT 35.9* 32.3*     --      Lab Results   Component Value Date    TSH 1.25 09/23/2012     No results found for: IRON, TIBC, FERRITIN, FOLATE, [id number], PTH  BMP:  Recent Labs     02/25/20  0802      K 3.7   CL 98*   CO2 25   BUN 16   CREATININE 0.9   GLUCOSE 111*   CALCIUM 9.1       Lab Results   Component Value Date    LABA1C 6.0 09/21/2014     ABGs:  No results for input(s): PHART, RXO2ESW, PO2ART in the last 72 hours. INR:  Lab Results   Component Value Date    INR 1.09 02/25/2020    INR 1.06 12/12/2019    INR 1.21 (H) 08/31/2013     U/A:  Recent Labs     02/25/20  0740   COLORU Yellow   PHUR 6.0   WBCUA None seen   RBCUA 5-10*   CLARITYU Clear   SPECGRAV 1.025   LEUKOCYTESUR Negative   UROBILINOGEN 0.2   BILIRUBINUR Negative   BLOODU TRACE-INTACT*   GLUCOSEU Negative        ASSESSMENT/PLAN:       S/p Infected left total knee replacement  Essential hypertension  Diabetes mellitus type 2  Asthma/COPD, stable  Mild acute blood loss anemia, not unexpected for this type of surgery. Plan:   Continue IV antibiotics daptomycin 500 mg IV daily and ceftriaxone 2 g daily ( per ID specialist recommendations)  Continue home medications for hypertension and diabetes mellitus type 2  Carb controlled diet  Encouraged incentive spirometry exercise  Discontinue Claritin-D because of the hypertension.     FEN - General diet  PPx - Xarelto 10 mg PO daily  CODE - Full Code  DISPO - Inpatient        Quillian Gosselin, M.D.

## 2020-02-26 NOTE — PROGRESS NOTES
Physical Therapy  Facility/Department: [facility] 5T ORTHO/NEURO  Daily Treatment Note  NAME: Ellen Cortez  : 1960  MRN: [id number]    Date of Service: 2020    Discharge Recommendations:    Ellen Cortez scored a 20/24 on the AM-PAC short mobility form. Current research shows that an AM-PAC score of 18 or greater is typically associated with a discharge to the patient's home setting. Based on the patients AM-PAC score and their current functional mobility deficits, it is recommended that the patient have 2-3 sessions per week of Physical Therapy at d/c to increase the patients independence. PT Equipment Recommendations  Equipment Needed: No    Assessment   Body structures, Functions, Activity limitations: Decreased functional mobility   Assessment: Pt functioning below baseline s/p TKR revision. Anticipate continues progress while here & likely ok to go home upon D/C. Will cont PT while here to maximize independence. Treatment Diagnosis: impaired functional mobility. Prognosis: Good  Decision Making: Low Complexity  PT Education: Transfer Training;General Safety;Gait Training  Patient Education: LE ther ex  REQUIRES PT FOLLOW UP: Yes  Activity Tolerance  Activity Tolerance: Patient Tolerated treatment well     Patient Diagnosis(es): The encounter diagnosis was Status post total left knee replacement. has a past medical history of Acute renal failure ([facility] [address] Ankle disorder, Arthritis, Asthma, asthma, COPD (chronic obstructive pulmonary disease) ([facility] [address] Diabetes mellitus ([address] GERD (gastroesophageal reflux disease), GERD (gastroesophageal reflux disease), Heart murmur, History of gastritis, Hypertension, Pancreatitis, peripheral neuropathy, Spinal stenosis, spinal stenosis, and Ulcer. has a past surgical history that includes Rotator cuff repair (Bilateral); Cervical spine surgery; Carpal tunnel release (Left); Nerve Surgery; Colonoscopy; Ankle surgery (Right, [id number]);  Foot surgery; Knee Arthroplasty (Left); Revision total knee arthroplasty (Left, 12/17/2019); and Total knee arthroplasty (Left, 2/25/2020). Restrictions  Position Activity Restriction  Other position/activity restrictions: WBAT left LE, no left knee flexion, brace locked in extension  Subjective   General  Chart Reviewed: Yes  Additional Pertinent Hx: PMH: HTN, DM, OA, spinal stenosis, DDD, peripheral neuropathy, neck sx, left TKR & revision x 2. Pt admitted 2/25 for: INCISION AND DRAINAGE, CULTURE AND CLOSURE LEFT TOTAL KNEE ARTHROPLASTY WOUND,  POLY EXCHANGE  Family / Caregiver Present: Yes(wife)  Referring Practitioner: Michelle Bennett  Subjective  Subjective: Pt supine in bed & agreeable to PT. Pain Screening  Patient Currently in Pain: Yes(3/10 left knee)  Vital Signs  Patient Currently in Pain: Yes(3/10 left knee)       Orientation  Orientation  Overall Orientation Status: Within Normal Limits  Cognition      Objective   Bed mobility  Supine to Sit: Modified independent(HOB elevated)  Sit to Supine: Modified independent(HOB elevated)  Scooting: Independent  Transfers  Sit to Stand: Stand by assistance  Stand to sit: Stand by assistance  Ambulation  Ambulation?: Yes  Ambulation 1  Surface: level tile  Device: Rolling Walker  Other Apparatus: (left knee brace)  Assistance: Stand by assistance  Quality of Gait: flexed posture, decreased WB & stance time left LE. Gait Deviations: Slow Lorraine  Distance: 150 ft  Comments: step-to pattern  Stairs/Curb  Stairs?: Yes  Stairs  # Steps : 4  Stairs Height: 6\"  Rails: Bilateral  Assistance: Stand by assistance  Comment: step-to pattern        Exercises  Straight Leg Raise: x 10 left LE  Quad Sets: x 10 bilat  Hip Abduction: x 10 left LE  Ankle Pumps: x 20 bilat   AROM RLE (degrees)  RLE General AROM: ankle & hip WFL. knee NT (no flexion per orders). AROM LLE (degrees)  LLE AROM : WNL  Strength RLE  Comment: NT, good QS.   Strength LLE  Strength LLE: [facility] AM-PAC Score  AM-PAC Inpatient Mobility Raw Score : 20 (02/26/20 1543)  AM-PAC Inpatient T-Scale Score : 47.67 (02/26/20 1543)  Mobility Inpatient CMS 0-100% Score: 35.83 (02/26/20 1543)  Mobility Inpatient CMS G-Code Modifier : CJ (02/26/20 1543)          Goals  Short term goals  Time Frame for Short term goals: D/C  Short term goal 1: supine<->sit IND  Short term goal 2: sit<->stand SUPV  Short term goal 3: Amb 150 ft with RW SBA (met 2/26). new goal: Amb 150 ft with RW SUPV  Short term goal 4: pt will tolerate stair assessment (met 2/26). new goal: up/down flight of stairs, bilat rails SBA. Patient Goals   Patient goals : to return home    Plan    Plan  Times per week: 7  Times per day: Twice a day  Current Treatment Recommendations: Transfer Training, Gait Training, Stair training, Strengthening, Safety Education & Training  Safety Devices  Type of devices: Call light within reach, Left in bed, Bed alarm in place     Therapy Time   Individual Concurrent Group Co-treatment   Time In 1517         Time Out 1540         Minutes 23               This note will serve as a discharge summary if pt discharged prior to next treatment session.   Serge Barajas, PT

## 2020-02-26 NOTE — PLAN OF CARE
Problem: Pain:  Goal: Pain level will decrease  Description  Pain level will decrease. Pt denies pain at this time, resting comfortably eating breakfast.  2/26/2020 0815 by Marcelino Dill RN  Outcome: Ongoing       Problem: Falls - Risk of:  Goal: Will remain free from falls  Description  Fall precautions in place. Bed is in lowest position, wheels locked, alarm on, non-skid socks on. Call light and bedside table within reach. Patient calls out appropriately. Patient is up x2 assist with a walker. Will continue to assess and monitor.     2/26/2020 0815 by Marcelino Dill RN  Outcome: Ongoing

## 2020-02-26 NOTE — PROGRESS NOTES
Physical Therapy    Facility/Department: [facility] 5T ORTHO/NEURO  Initial Assessment/Treatment    NAME: Kaden Rene  : 1960  MRN: [id number]    Date of Service: 2020    Discharge Recommendations:    Kaden Rene scored a 20/24 on the AM-PAC short mobility form. Current research shows that an AM-PAC score of 18 or greater is typically associated with a discharge to the patient's home setting. Based on the patients AM-PAC score and their current functional mobility deficits, it is recommended that the patient have 2-3 sessions per week of Physical Therapy at d/c to increase the patients independence. PT Equipment Recommendations  Equipment Needed: No    Assessment   Body structures, Functions, Activity limitations: Decreased functional mobility   Assessment: Pt functioning below baseline s/p TKR revision. Anticipate good progress while here & likely ok to go home upon D/C. Will cont PT while here to maximize independence. Treatment Diagnosis: impaired functional mobility. Prognosis: Good  Decision Making: Low Complexity  PT Education: Goals;PT Role;Plan of Care;Transfer Training;General Safety;Gait Training  Patient Education: LE ther ex  REQUIRES PT FOLLOW UP: Yes  Activity Tolerance  Activity Tolerance: Patient Tolerated treatment well       Patient Diagnosis(es): The encounter diagnosis was Status post total left knee replacement. has a past medical history of Acute renal failure ([address] Ankle disorder, Arthritis, Asthma, asthma, COPD (chronic obstructive pulmonary disease) ([address] Diabetes mellitus ([address] GERD (gastroesophageal reflux disease), GERD (gastroesophageal reflux disease), Heart murmur, History of gastritis, Hypertension, Pancreatitis, peripheral neuropathy, Spinal stenosis, spinal stenosis, and Ulcer. has a past surgical history that includes Rotator cuff repair (Bilateral); Cervical spine surgery; Carpal tunnel release (Left); Nerve Surgery; Colonoscopy;  Ankle surgery (Right, [id number]); Foot surgery; Knee Arthroplasty (Left); Revision total knee arthroplasty (Left, 12/17/2019); and Total knee arthroplasty (Left, 2/25/2020). Restrictions  Position Activity Restriction  Other position/activity restrictions: WBAT left LE, no left knee flexion, brace locked in extension  Vision/Hearing  Vision: Within Functional Limits(with glasses)  Hearing: Within functional limits     Subjective  General  Chart Reviewed: Yes  Patient assessed for rehabilitation services?: Yes  Additional Pertinent Hx: PMH: HTN, DM, OA, spinal stenosis, DDD, peripheral neuropathy, neck sx, left TKR & revision x 2. Pt admitted 2/25 for: INCISION AND DRAINAGE, CULTURE AND CLOSURE LEFT TOTAL KNEE ARTHROPLASTY WOUND,  POLY EXCHANGE  Family / Caregiver Present: No  Referring Practitioner: Padmini Romo  Referral Date : 02/25/20  Diagnosis: left TKR I & D, poly exchange  Follows Commands: Within Functional Limits  Subjective  Subjective: Pt supine in bed & agreeable to PT.   Pain Screening  Patient Currently in Pain: Yes(left knee 3/10)  Vital Signs  Patient Currently in Pain: Yes(left knee 3/10)       Orientation  Orientation  Overall Orientation Status: Within Normal Limits  Social/Functional History  Social/Functional History  Lives With: Spouse  Type of Home: House  Home Layout: Two level, Bed/Bath upstairs, 1/2 bath on main level  Home Access: Stairs to enter with rails  Entrance Stairs - Number of Steps: 2  Entrance Stairs - Rails: Both  Bathroom Shower/Tub: Tub/Shower unit, Walk-in shower  Bathroom Toilet: Standard(next to sink)  Bathroom Equipment: Shower chair  Home Equipment: Rolling walker, 4 wheeled walker, Cane, Quad cane  Receives Help From: Family  ADL Assistance: Independent  Homemaking Assistance: Independent  Homemaking Responsibilities: (wife can do all)  Ambulation Assistance: Independent(no device in house, cane outside)  Transfer Assistance: Independent  Active : Yes  Occupation: Retired  Additional Comments: pt reports no recent falls. wife & son can assist as needed. Objective          AROM RLE (degrees)  RLE General AROM: ankle & hip WFL. knee NT (no flexion per orders). AROM LLE (degrees)  LLE AROM : WNL  Strength RLE  Comment: NT, good QS. Strength LLE  Strength LLE: WFL        Bed mobility  Supine to Sit: Supervision(HOB elevated)  Transfers  Sit to Stand: Stand by assistance  Stand to sit: Stand by assistance  Ambulation  Ambulation?: Yes  Ambulation 1  Surface: level tile  Device: Rolling Walker  Other Apparatus: (knee brace left)  Assistance: Contact guard assistance;Stand by assistance  Quality of Gait: flexed posture, decreased WB & stance time left LE.   Gait Deviations: Slow Lorraine  Distance: 100 ft  Comments: step-to pattern  Stairs/Curb  Stairs?: No        Exercises  Straight Leg Raise: x 10 left LE  Quad Sets: x 10 bilat  Hip Abduction: x 10 left LE  Ankle Pumps: x 20 bilat     Plan   Plan  Times per week: 7  Times per day: Twice a day  Current Treatment Recommendations: Transfer Training, Gait Training, Stair training, Strengthening, Safety Education & Training  Safety Devices  Type of devices: Call light within reach, Chair alarm in place, Left in chair, Nurse notified                                                   AM-PAC Score  AM-PAC Inpatient Mobility Raw Score : 20 (02/26/20 1144)  AM-PAC Inpatient T-Scale Score : 47.67 (02/26/20 1144)  Mobility Inpatient CMS 0-100% Score: 35.83 (02/26/20 1144)  Mobility Inpatient CMS G-Code Modifier : Anni Brian (02/26/20 1144)          Goals  Short term goals  Time Frame for Short term goals: D/C  Short term goal 1: supine<->sit IND  Short term goal 2: sit<->stand SUPV  Short term goal 3: Amb 150 ft with RW SBA  Short term goal 4: pt will tolerate stair assessment  Patient Goals   Patient goals : to return home       Therapy Time   Individual Concurrent Group Co-treatment   Time In 0182         Time Out 0928         Minutes 38 Flori Barajas, PT

## 2020-02-26 NOTE — CONSULTS
Infectious Diseases Inpatient Consult Note    Reason for Consult:   L TKA infection  Requesting Physician:   Dr Angela Quezada  Primary Care Physician:  Ephriam Osler, MD  History Obtained From:   Pt, EPIC    Admit Date: 2/25/2020  Hospital Day: 2    CHIEF COMPLAINT:     L knee infection    HISTORY OF PRESENT ILLNESS:      61 yo man with hx DM, HTN, neuropathy, COPD / asthma, OA    Primary L TKA  Revision 12/17/19  - poyethylene exchange for instability  Revision 2/13/20  - removal displaced patellar component, reconstruction extensor mechanism     Pt has had drainage from wound since surg, bloody, + some pain, no f/c  + L knee synovial fluid alpha defensin x 2, culture neg (had been on cephalexin)    Admit / OR 2/25 - I&D, polyethylene inserts exchange  GS no WBC, no organism  Started on vancomycin    Past Medical History:    Past Medical History:   Diagnosis Date    Acute renal failure ([address] 9/28/2012    Ankle disorder 7/2013    impingement, right    Arthritis     Asthma     asthma     [facility]     Diabetes mellitus ([facility] 8/19/2011    GERD (gastroesophageal reflux disease)     GERD (gastroesophageal reflux disease)     Heart murmur     History of gastritis     Hypertension     Pancreatitis     peripheral neuropathy     both legs    Spinal stenosis     spinal stenosis     spinal stenosis, DDD    Ulcer        Past Surgical History:    Past Surgical History:   Procedure Laterality Date    ANKLE SURGERY Right [id number]    RIGHT ANKLE ARTHROTOMY WITH EXPLORATION, REMOVAL OF LOOSE    CARPAL TUNNEL RELEASE Left     CERVICAL SPINE SURGERY      COLONOSCOPY      FOOT SURGERY      bone spur removed right foot    KNEE ARTHROPLASTY Left     NERVE SURGERY      ulnar, bilat.     REVISION TOTAL KNEE ARTHROPLASTY Left 12/17/2019    LEFT TOTAL KNEE DEBRIDEMENT AND POLYETHYLENE EXCHANGE performed by Paco Miller MD at [address] [place] [address] Bilateral     TOTAL KNEE ARTHROPLASTY Left 2/25/2020    INCISION AND DRAINAGE, CULTURE AND CLOSURE LEFT TOTAL KNEE ARTHROPLASTY WOUND,  POLY EXCHANGE performed by Moses Thomas MD at [address] OR       Current Medications:     LORazepam  1 mg Oral Q6H    insulin lispro  0-6 Units Subcutaneous TID WC    insulin lispro  0-3 Units Subcutaneous Nightly    gabapentin  300 mg Oral TID    linaclotide  290 mcg Oral QAM AC    loratadine-pseudoephedrine  1 tablet Oral Daily    pioglitazone  15 mg Oral Daily    pravastatin  20 mg Oral Nightly    tamsulosin  0.4 mg Oral Daily    venlafaxine  150 mg Oral Daily    sodium chloride flush  10 mL Intravenous 2 times per day    sennosides-docusate sodium  1 tablet Oral BID    rivaroxaban  10 mg Oral Daily    oxyCODONE-acetaminophen  2 tablet Oral 4 times per day    amLODIPine  10 mg Oral Daily    And    valsartan-hydrochlorothiazide  1 tablet Oral Daily    budesonide  0.5 mg Nebulization BID    And    Arformoterol Tartrate  15 mcg Nebulization BID    vancomycin (VANCOCIN) IV  1,500 mg Intravenous Q12H       Allergies:  Metformin; Shellfish-derived products; and Simvastatin    Social History:    TOBACCO:    None - past cig, quit 1991  ETOH:    Occasional   DRUGS:   Occasional marijuana  MARITAL STATUS:      OCCUPATION:       Family History:   No immunodeficiency    REVIEW OF SYSTEMS:    No fever / chills / sweats. No weight loss. No visual change, eye pain, eye discharge. No oral lesion, sore throat, dysphagia. Denies cough / sputum. Denies chest pain, palpitations. Denies n / v / abd pain. No diarrhea. Denies dysuria or change in urinary function. Denies joint swelling or pain. No myalgia, arthralgia.   Denies skin changes, itching  Denies focal weakness, sensory change or other neurologic symptom    Denies new / worse depression, psychiatric symptoms  Denies any Endocrine or Hematologic symptoms    PHYSICAL EXAM:      Vitals:    /76   Pulse 72   Temp 98.2 °F (36.8 °C) (Oral)   Resp 16   Ht 6' 1\" (1.854 m)   Wt 203 lb (92.1 kg)   SpO2 96%   BMI 26.78 kg/m²     GENERAL: No apparent distress.     HEENT: Membranes moist, no oral lesion, PERRL  NECK:  Supple, no lymphadenopaty  LUNGS: Clear b/l, no rales, no dullness  CARDIAC: RRR, no murmur appreciated  ABD:  + BS, soft / NT  EXT:  No rash, no edema, no lesions - L knee with dressing / ACE, drain  NEURO: No focal neurologic findings  PSYCH: Orientation, sensorium, mood normal  LINES:  R PICC in place     DATA:    Lab Results   Component Value Date    WBC 6.3 02/25/2020    HGB 10.7 (L) 02/26/2020    HCT 32.3 (L) 02/26/2020    MCV 93.3 02/25/2020     02/25/2020     Lab Results   Component Value Date    CREATININE 0.9 02/25/2020    BUN 16 02/25/2020     02/25/2020    K 3.7 02/25/2020    CL 98 (L) 02/25/2020    CO2 25 02/25/2020       Hepatic Function Panel:   Lab Results   Component Value Date    ALKPHOS 104 09/22/2014    ALT 33 09/22/2014    AST 26 09/22/2014    PROT 9.0 09/22/2014    PROT 8.1 11/19/2012    BILITOT 0.4 09/22/2014    BILIDIR 0.2 09/21/2014    IBILI 0.1 09/21/2014    LABALBU 4.0 09/23/2014       CRP 8.2, ESR     Micro:  2/25 OR - tissue (3), GS no WBC, no organism    Imaging:   None     IMPRESSION:      Patient Active Problem List   Diagnosis    Hypertension    Vomiting    Abdominal pain    Abdominal pain, epigastric    Asthma    Chronic back pain    GERD (gastroesophageal reflux disease)    Diabetes mellitus (HCC)    Acute renal failure (HCC)    Vomiting    Hearing loss d/t noise    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)    COPD (chronic obstructive pulmonary disease) (HCC)    Infection of total left knee replacement (HCC)       L TKA infection, has had mult surg, + alpha defensin    RECOMMENDATIONS:    Agree with vancomycin  Add ceftriaxone   Will f/u on cult - asked Micro to hold x 3 weeks  PICC  See CHARLEE - home on daptomycin + ceftriaxone Discussed with pt, Dr Aleks Oglesby MD     INFUSION ORDERS:  Daptomycin 500 mg iv daily through 4/8/20  Ceftriaxone 2 gm daily through 4/8/20  - First dose given in hospital  - PICC   - Disposition / date discharge  - Check CBC w diff, CMP, ESR, CRP, vancomycin trough every Mon or Tue - FAX result to [phone]  - Call with antibiotic / infusion issues, [phone]  - No f/u in outpatient ID office necessary

## 2020-02-26 NOTE — PLAN OF CARE
Problem: Pain:  Goal: Pain level will decrease  Description  Pain level will decrease  Outcome: Ongoing  Note:   Patient's pain will continue to improve and continue to be managed per the [facility] Problem: Falls - Risk of:  Goal: Will remain free from falls  Description  Will remain free from falls  Outcome: Ongoing  Note:   Patient will remain free from falls. Patient will use call light to notify staff of toileting needs prior to exiting the bed. Problem: Mobility - Impaired:  Goal: Mobility will improve  Description  Mobility will improve  Outcome: Ongoing  Note:   Patient will work with staff to begin ambulating in the room to the bathroom to void.

## 2020-02-26 NOTE — CARE COORDINATION
Case Management Assessment           Initial Evaluation                Date / Time of Evaluation: 2/26/2020 4:55 PM                 Assessment Completed by: Emerson Chamberlain     Spoke with patient regarding discharge needs and pt has agreed to home care with [facility] as before. The only equipment needs he has is for a raised toilet seat. Referral was made with Mauri from [address] [place] [address] to deliver this to his room. Referral made with [facility] as well. Liv Peter called from [facility] and pt's deductible and OOP have been met and patient will be covered at 100% for IV ABX. Liv Peter was going to talk with the patient today as well. Patient Name: Jenni Martinez     YOB: 1960  Diagnosis: Status post total left knee replacement [Z96.652]  Infection of total left knee replacement, subsequent encounter [T84.54XD]     Date / Time: 2/25/2020  7:08 AM    Patient Admission Status: Inpatient    If patient is discharged prior to next notation, then this note serves as note for discharge by case management.      Current PCP: Brook Andrade MD  Clinic Patient: No    Chart Reviewed: Yes  Patient/ Family Interviewed: Yes    Initial assessment completed at bedside with: patient    Hospitalization in the last 30 days: No    Emergency Contacts:  Extended Emergency Contact Information  Primary Emergency Contact: Kim Dinero  Address: [address] [place] [address] Phone: [phone]  Mobile Phone: [phone]  Relation: Spouse    Advance Directives:   Code Status: Full Code      Financial  Payor: Katy Luna / Plan: Katy [place] PPO / Product Type: *No Product type* /     Pre-cert required for SNF: Yes    Pharmacy    [facility] [address], [address] [facility] [phone]  [address] [place] [id number]  Phone: [phone] Fax: [phone]      Potential assistance Purchasing Medications: Potential Assistance Purchasing Medications: No  Does Patient want to participate in local refill/ meds to beds program?: No    Meds To Beds General Rules:  1. Can ONLY be done Monday- Friday between 8:30am-5pm  2. Prescription(s) must be in pharmacy by 3pm to be filled same day  3. Copy of patient's insurance/ prescription drug card and patient face sheet must be sent along with the prescription(s)  4. Cost of Rx cannot be added to hospital bill. If financial assistance is needed, please contact unit  or ;  or  CANNOT provide pharmacy voucher for patients co-pays  5.  Patients can then  the prescription on their way out of the hospital at discharge, or pharmacy can deliver to the bedside if staff is available. (payment due at time of pick-up or delivery - cash, check, or card accepted)     Able to afford home medications/ co-pay costs: Yes    ADLS  Support Systems: Spouse/Significant Other    PT AM-PAC: 20 /24  OT AM-PAC: 22 /24    [place] [address] Environment:two level home  Steps: 2    Plans to RETURN to current housing: Yes  Barriers to RETURNING to current housing: none noted    Annetta Birch 77  Currently ACTIVE with [address]: [facility]: [facility] [address]  Phone: [phone] Fax: [phone]      Durable Medical Equipment  DME Provider: [facility]  Equipment: raised toilet seat      DISCHARGE PLAN:  Disposition: Home with [employer] [address]: [facility] home care and Home Infusion: [facility] Phone: na Fax: na    Transportation PLAN for discharge: family     Factors facilitating achievement of predicted outcomes: Family support, Cooperative and Pleasant    Barriers to discharge: Pain     Additional Case Management Notes: NA    The Plan for Transition of Care is related to the following treatment goals of Status post total left knee replacement [Z96.652]  Infection of total left knee replacement, subsequent encounter [T84.54XD]    The Patient and/or patient representative Serge and his family were provided with a choice of provider and agrees with the discharge plan Yes    Freedom of choice list was provided with basic dialogue that supports the patient's individualized plan of care/goals and shares the quality data associated with the providers.  Not Indicated      Care Transition patient: No    Marlee Mantilla RN  [facility]  Case Management Department  Ph: [phone]   Fax: [phone]

## 2020-02-27 VITALS
HEART RATE: 79 BPM | HEIGHT: 73 IN | OXYGEN SATURATION: 94 % | BODY MASS INDEX: 26.9 KG/M2 | DIASTOLIC BLOOD PRESSURE: 75 MMHG | WEIGHT: 203 LBS | SYSTOLIC BLOOD PRESSURE: 127 MMHG | RESPIRATION RATE: 16 BRPM | TEMPERATURE: 98 F

## 2020-02-27 LAB
ANION GAP SERPL CALCULATED.3IONS-SCNC: 10 MMOL/L (ref 3–16)
BUN BLDV-MCNC: 13 MG/DL (ref 7–20)
CALCIUM SERPL-MCNC: 8.9 MG/DL (ref 8.3–10.6)
CHLORIDE BLD-SCNC: 97 MMOL/L (ref 99–110)
CO2: 31 MMOL/L (ref 21–32)
CREAT SERPL-MCNC: 0.9 MG/DL (ref 0.8–1.3)
GFR AFRICAN AMERICAN: >60
GFR NON-AFRICAN AMERICAN: >60
GLUCOSE BLD-MCNC: 101 MG/DL (ref 70–99)
GLUCOSE BLD-MCNC: 102 MG/DL (ref 70–99)
GLUCOSE BLD-MCNC: 103 MG/DL (ref 70–99)
GLUCOSE BLD-MCNC: 96 MG/DL (ref 70–99)
PERFORMED ON: ABNORMAL
POTASSIUM SERPL-SCNC: 3.4 MMOL/L (ref 3.5–5.1)
SODIUM BLD-SCNC: 138 MMOL/L (ref 136–145)
VANCOMYCIN TROUGH: 16.7 UG/ML (ref 10–20)

## 2020-02-27 PROCEDURE — 6360000002 HC RX W HCPCS: Performed by: ORTHOPAEDIC SURGERY

## 2020-02-27 PROCEDURE — 94640 AIRWAY INHALATION TREATMENT: CPT

## 2020-02-27 PROCEDURE — 6370000000 HC RX 637 (ALT 250 FOR IP): Performed by: HOSPITALIST

## 2020-02-27 PROCEDURE — 99231 SBSQ HOSP IP/OBS SF/LOW 25: CPT | Performed by: HOSPITALIST

## 2020-02-27 PROCEDURE — 97110 THERAPEUTIC EXERCISES: CPT

## 2020-02-27 PROCEDURE — 2580000003 HC RX 258: Performed by: ORTHOPAEDIC SURGERY

## 2020-02-27 PROCEDURE — 6370000000 HC RX 637 (ALT 250 FOR IP): Performed by: ORTHOPAEDIC SURGERY

## 2020-02-27 PROCEDURE — 2580000003 HC RX 258: Performed by: INTERNAL MEDICINE

## 2020-02-27 PROCEDURE — 2580000003 HC RX 258

## 2020-02-27 PROCEDURE — 80202 ASSAY OF VANCOMYCIN: CPT

## 2020-02-27 PROCEDURE — 99232 SBSQ HOSP IP/OBS MODERATE 35: CPT | Performed by: INTERNAL MEDICINE

## 2020-02-27 PROCEDURE — 80048 BASIC METABOLIC PNL TOTAL CA: CPT

## 2020-02-27 PROCEDURE — 6360000002 HC RX W HCPCS: Performed by: INTERNAL MEDICINE

## 2020-02-27 PROCEDURE — 97116 GAIT TRAINING THERAPY: CPT

## 2020-02-27 RX ORDER — POTASSIUM CHLORIDE 20 MEQ/1
40 TABLET, EXTENDED RELEASE ORAL ONCE
Status: COMPLETED | OUTPATIENT
Start: 2020-02-27 | End: 2020-02-27

## 2020-02-27 RX ORDER — SODIUM CHLORIDE 9 MG/ML
INJECTION, SOLUTION INTRAVENOUS
Status: COMPLETED
Start: 2020-02-27 | End: 2020-02-27

## 2020-02-27 RX ADMIN — DAPTOMYCIN 480 MG: 500 INJECTION, POWDER, LYOPHILIZED, FOR SOLUTION INTRAVENOUS at 13:07

## 2020-02-27 RX ADMIN — LORAZEPAM 1 MG: 1 TABLET ORAL at 07:23

## 2020-02-27 RX ADMIN — LORAZEPAM 1 MG: 1 TABLET ORAL at 15:03

## 2020-02-27 RX ADMIN — POTASSIUM CHLORIDE 40 MEQ: 20 TABLET, EXTENDED RELEASE ORAL at 09:03

## 2020-02-27 RX ADMIN — AMLODIPINE BESYLATE 10 MG: 10 TABLET ORAL at 09:02

## 2020-02-27 RX ADMIN — VENLAFAXINE HYDROCHLORIDE 150 MG: 150 CAPSULE, EXTENDED RELEASE ORAL at 09:02

## 2020-02-27 RX ADMIN — OXYCODONE HYDROCHLORIDE AND ACETAMINOPHEN 2 TABLET: 5; 325 TABLET ORAL at 06:08

## 2020-02-27 RX ADMIN — CETIRIZINE HYDROCHLORIDE 10 MG: 10 TABLET, FILM COATED ORAL at 09:02

## 2020-02-27 RX ADMIN — ARFORMOTEROL TARTRATE 15 MCG: 15 SOLUTION RESPIRATORY (INHALATION) at 07:49

## 2020-02-27 RX ADMIN — CEFTRIAXONE SODIUM 2 G: 2 INJECTION, POWDER, FOR SOLUTION INTRAMUSCULAR; INTRAVENOUS at 17:18

## 2020-02-27 RX ADMIN — SODIUM CHLORIDE: 900 INJECTION, SOLUTION INTRAVENOUS at 15:04

## 2020-02-27 RX ADMIN — GABAPENTIN 300 MG: 300 CAPSULE ORAL at 15:09

## 2020-02-27 RX ADMIN — OXYCODONE 5 MG: 5 TABLET ORAL at 09:03

## 2020-02-27 RX ADMIN — LORAZEPAM 1 MG: 1 TABLET ORAL at 00:59

## 2020-02-27 RX ADMIN — VANCOMYCIN HYDROCHLORIDE 1500 MG: 10 INJECTION, POWDER, LYOPHILIZED, FOR SOLUTION INTRAVENOUS at 06:08

## 2020-02-27 RX ADMIN — PIOGLITAZONE 15 MG: 15 TABLET ORAL at 09:02

## 2020-02-27 RX ADMIN — TAMSULOSIN HYDROCHLORIDE 0.4 MG: 0.4 CAPSULE ORAL at 09:03

## 2020-02-27 RX ADMIN — GABAPENTIN 300 MG: 300 CAPSULE ORAL at 09:03

## 2020-02-27 RX ADMIN — RIVAROXABAN 10 MG: 10 TABLET, FILM COATED ORAL at 17:19

## 2020-02-27 RX ADMIN — Medication 10 ML: at 09:15

## 2020-02-27 RX ADMIN — SENNOSIDES AND DOCUSATE SODIUM 1 TABLET: 8.6; 5 TABLET ORAL at 09:03

## 2020-02-27 RX ADMIN — OXYCODONE HYDROCHLORIDE AND ACETAMINOPHEN 2 TABLET: 5; 325 TABLET ORAL at 12:19

## 2020-02-27 RX ADMIN — BUDESONIDE 500 MCG: 0.5 SUSPENSION RESPIRATORY (INHALATION) at 07:49

## 2020-02-27 RX ADMIN — VALSARTAN AND HYDROCHLOROTHIAZIDE 1 TABLET: 320; 25 TABLET, FILM COATED ORAL at 09:02

## 2020-02-27 RX ADMIN — OXYCODONE HYDROCHLORIDE AND ACETAMINOPHEN 2 TABLET: 5; 325 TABLET ORAL at 17:19

## 2020-02-27 ASSESSMENT — PAIN DESCRIPTION - ORIENTATION
ORIENTATION: LEFT

## 2020-02-27 ASSESSMENT — PAIN DESCRIPTION - FREQUENCY
FREQUENCY: CONTINUOUS

## 2020-02-27 ASSESSMENT — PAIN SCALES - GENERAL
PAINLEVEL_OUTOF10: 3
PAINLEVEL_OUTOF10: 2
PAINLEVEL_OUTOF10: 4

## 2020-02-27 ASSESSMENT — PAIN - FUNCTIONAL ASSESSMENT: PAIN_FUNCTIONAL_ASSESSMENT: PREVENTS OR INTERFERES WITH MANY ACTIVE NOT PASSIVE ACTIVITIES

## 2020-02-27 ASSESSMENT — PAIN DESCRIPTION - LOCATION
LOCATION: KNEE

## 2020-02-27 ASSESSMENT — PAIN DESCRIPTION - ONSET
ONSET: ON-GOING
ONSET: GRADUAL
ONSET: ON-GOING

## 2020-02-27 ASSESSMENT — PAIN DESCRIPTION - DESCRIPTORS
DESCRIPTORS: ACHING

## 2020-02-27 ASSESSMENT — PAIN DESCRIPTION - PAIN TYPE
TYPE: SURGICAL PAIN

## 2020-02-27 ASSESSMENT — PAIN DESCRIPTION - PROGRESSION
CLINICAL_PROGRESSION: NOT CHANGED

## 2020-02-27 NOTE — PLAN OF CARE
Problem: Pain:  Goal: Pain level will decrease  Outcome: Ongoing  Note:   Surgial pain to left knee is adequately controlled with Scheduled and PRN pain medication. Pt resting comfortably at this time and denies any further pain intervention     Problem: Falls - Risk of:  Goal: Will remain free from falls  Note:   Patient is a high fall risk. All fall precautions in place: bed alarm on, nonskid socks on pt, call light within reach, bed locked in lowest position. Will continue to monitor pt safety. Problem: Mobility - Impaired:  Goal: Mobility will improve  Note:   Pt tolerating ambulation well. Will continue to ambulate pt.

## 2020-02-27 NOTE — PLAN OF CARE
Problem: Falls - Risk of:  Goal: Will remain free from falls  Description  Patient in chair with alarm and nonskid socks on, call light, belongings, and bedside table within reach. Patient educated on using call light and instructed to call out for assistance when getting up, patient verbalized understanding. Patient calls out approprietly and remains free of falls.  Will remain free from falls   2/27/2020 1315 by Nhea Garcia RN  Outcome: Ongoing     Problem: Pain:  Goal: Control of acute pain  Description  Control of acute pain  Outcome: Ongoing

## 2020-02-27 NOTE — CARE COORDINATION
Case Management Assessment            Discharge Note                    Date / Time of Note: 2/27/2020 4:03 PM                  Discharge Note Completed by: Bautista Spaulding    Patient Name: Brenda Jc   YOB: 1960  Diagnosis: Status post total left knee replacement [Z96.652]  Infection of total left knee replacement, subsequent encounter [T84.54XD]   Date / Time: 2/25/2020  7:08 AM    Current PCP: Karen Piña MD  Clinic patient: No    Hospitalization in the last 30 days: No    Advance Directives:  Code Status: Full Code  [place] DNR form completed and on chart: No    Financial:  Payor: Clotilde Hanna / Plan: [employer] PPO / Product Type: *No Product type* /      Pharmacy:    [place] [id number] [address] [facility], [address] [place] [address] [phone]  [address] [place], [place] [id number]  Phone: [phone] Fax: [phone]      Assistance purchasing medications?: Potential Assistance Purchasing Medications: No  Assistance provided by Case Management: None at this time    Does patient want to participate in local refill/ meds to beds program?: No    Meds To Beds General Rules:  1. Can ONLY be done Monday- Friday between 8:30am-5pm  2. Prescription(s) must be in pharmacy by 3pm to be filled same day  3. Copy of patient's insurance/ prescription drug card and patient face sheet must be sent along with the prescription(s)  4. Cost of Rx cannot be added to hospital bill. If financial assistance is needed, please contact unit  or ;  or  CANNOT provide pharmacy voucher for patients co-pays  5.  Patients can then  the prescription on their way out of the hospital at discharge, or pharmacy can deliver to the bedside if staff is available. (payment due at time of pick-up or delivery - cash, check, or card accepted)     Able to afford home medications/ co-pay costs: Yes    ADLS:  Current PT AM-PAC Score: 20 /24  Current OT AM-PAC Score: 22 /24      DISCHARGE Disposition: Home with Home Health Care: [facility]  and Home Infusion: [facility] Phone: na Fax: na    LOC at discharge: Not Applicable  CHARLEE Completed: Yes    Notification completed in HENS/PAS?:  Not Applicable    IMM Completed:   Not Indicated    Transportation:  Transportation PLAN for discharge: family   Mode of Transport: [address]:  Home Care ordered at discharge: Yes  [address]: [address] [facility] [address]  Phone: [phone] Fax: [phone]  Orders faxed: No-called to verify that they had pulled the orders and Mundo Rivas from [employer] faxed orders to the office. Durable Medical Equipment:  DME Provider: [facility]  Equipment obtained during hospitalization: raised toilet seat      The Plan for Transition of Care is related to the following treatment goals of Status post total left knee replacement [Z96.652]  Infection of total left knee replacement, subsequent encounter [T84.54XD]    The Patient and/or patient representative Serge and his family were provided with a choice of provider and agrees with the discharge plan Yes    Freedom of choice list was provided with basic dialogue that supports the patient's individualized plan of care/goals and shares the quality data associated with the providers.  Not Indicated    Care Transitions patient: No    Yosi Price RN  [facility]  Case Management Department  Ph: [phone]  KKL:[phone]

## 2020-02-27 NOTE — PROGRESS NOTES
HOSPITAL MEDICINE  - PROGRESS NOTE    Admit Date: 2/25/2020         The patient was seen and examined. Subjective: No nausea/ vomiting. No urinary retention. Pain control is adequate. Grades his pain 2-7/10 in intensity. Objective: laying in a bed in no acute distress. Pleasant and cooperative with examination.   BP range: 118//78 mm Hg    Diet: DIET GENERAL;  Dietary Nutrition Supplements: Wound Healing Oral Supplement     Accucheck Glucoses:   Recent Labs     02/25/20  1333 02/26/20  1620 02/26/20 2027 02/27/20  0706 02/27/20  1124   POCGLU 106* 107* 149* 102* 101*         Data:   Scheduled Med:    LORazepam  1 mg Oral Q6H    insulin lispro  0-6 Units Subcutaneous TID WC    insulin lispro  0-3 Units Subcutaneous Nightly    cefTRIAXone (ROCEPHIN) IV  2 g Intravenous Q24H    cetirizine  10 mg Oral Daily    gabapentin  300 mg Oral TID    linaclotide  290 mcg Oral QAM AC    pioglitazone  15 mg Oral Daily    pravastatin  20 mg Oral Nightly    tamsulosin  0.4 mg Oral Daily    venlafaxine  150 mg Oral Daily    sodium chloride flush  10 mL Intravenous 2 times per day    sennosides-docusate sodium  1 tablet Oral BID    rivaroxaban  10 mg Oral Daily    oxyCODONE-acetaminophen  2 tablet Oral 4 times per day    amLODIPine  10 mg Oral Daily    And    valsartan-hydrochlorothiazide  1 tablet Oral Daily    budesonide  0.5 mg Nebulization BID    And    Arformoterol Tartrate  15 mcg Nebulization BID     Continuous Infusions:   lactated ringers 125 mL/hr at 02/26/20 0108       Intake/Output Summary (Last 24 hours) at 2/27/2020 1609  Last data filed at 2/27/2020 1504  Gross per 24 hour   Intake --   Output 1315 ml   Net -1315 ml     CBC:   Recent Labs     02/25/20  0802 02/26/20  0616   WBC 6.3  --    HGB 11.9* 10.7*     --      BMP:  Recent Labs     02/25/20  0802 02/27/20  0621    138   K 3.7 3.4*   CL 98* 97*   CO2 25 31   BUN 16 13   CREATININE 0.9 0.9   GLUCOSE 111* 96 LFTsNo results for input(s): AST, ALT, ALB, BILIDIR, BILITOT, ALKPHOS in the last 72 hours. ABGs: No results found for: PHART, PO2ART, YOR1NPW  INR:   Recent Labs     02/25/20  0802   INR 1.09     -----------------------------------------------------------------    Objective:   Vitals: /75   Pulse 79   Temp 98 °F (36.7 °C) (Oral)   Resp 16   Ht 6' 1\" (1.854 m)   Wt 203 lb (92.1 kg)   SpO2 94%   BMI 26.78 kg/m²   General appearance: alert, appears stated age and cooperative  Skin: Skin color, texture, turgor normal.   HEENT: Head: Normal, normocephalic, atraumatic. Neck: no adenopathy, no carotid bruit, no JVD, supple, symmetrical, trachea midline and thyroid not enlarged, symmetric, no tenderness/mass/nodules  Lungs: clear to auscultation bilaterally  Heart: regular rate and rhythm, S1, S2 normal, no murmur, click, rub or gallop  Abdomen: soft, non-tender; bowel sounds normal; no masses,  no organomegaly  Extremities: + surgical dressing over L knee. Hemovac is in place. Lymphatic: No significant lymph node enlargement papable  Neurologic: Mental status: Alert, oriented, thought content appropriate      Assessment:   Patient Active Problem List:   S/p Infected left total knee replacement  Essential hypertension  Diabetes mellitus type 2  Asthma/COPD, stable  Mild acute blood loss anemia, not unexpected for this type of surgery. Plan:   1. Continue current management  2. See orders  3. Disposition: inpatient  4. Stable from IM point. Will follow up as needed.     Berwyn Dose

## 2020-02-27 NOTE — PROGRESS NOTES
Patient discharged with belongings, pain medication prescription, and follow up instructions. PICC capped off with alcohol cap. Patient transported to main entrance via wheelchair, spouse at [facility].

## 2020-02-27 NOTE — DISCHARGE SUMMARY
POD#2    Drain out    [employer] negative    Labs stable    Cultures pending and from outpt office still no growth    Has Xarelto at home    Recommendations from ID    DYLON today    ES[facility] [place]
and defecated before discharge. The patient had no new complication or adverse events. Consultation with ID was done for definite care    The patient was stable at discharge. Follow-up is arranged with Dr. Aaron Butt for clinical and radiographic review in the next two to three weeks. Written instructions, prescriptions, and directions were provided in detail. Thromboembolic prevention was provided with mechanical and pharmacologic means as necessary, and the patient will continue the pharmacologist prophylaxis as an outpatient as directed by Dr. Aaron Butt. The patients wound will be addressed by a [facility] and IV therapy and sterile wound care will continue for about 18 days and/or until the staples are removed. The patient is discharged in a stable and satisfactory condition and is discharged to home    Allergies   Allergen Reactions    Metformin Other (See Comments)     GI upset    Shellfish-Derived Products Hives    Simvastatin Other (See Comments)     GI upset       Pain Management: This patient is recovering from a major orthopedic injury/surgery (Revision left TKR). In my experience (over 30 years) this type of surgery will require narcotic analgesics will likely be required for more than 7 days. It is medically necessary to exceed an average daily dose of 30 MED to provide adequate analgesia to ensure patient comfort and successful rehabilitation. The patient was advised to use the minimum possible dose to adequately control pain and facilitate rehabilitation. We will attempt to discontinue all narcotic analgesics as soon as possible. The risks and benefits of narcotic addiction, substance abuse and all potential side effects were all carefully discussed. The patient voiced understanding, consent, and agreement.    __________________________________  Whit Horne. Aaron Butt M.D., M.B. A.

## 2020-02-27 NOTE — PROGRESS NOTES
ID Follow-up NOTE    CC:   L TKA infection  Antibiotics: Vancomycin, Ceftriaxone     Admit Date: 2/25/2020  Hospital Day: 3    Subjective:     Patient reports he is 'good'  L knee / surg site pain controlled       Objective:     Patient Vitals for the past 8 hrs:   BP Temp Temp src Pulse Resp SpO2   02/27/20 0750 -- -- -- -- 16 98 %   02/27/20 0714 136/73 98 °F (36.7 °C) Oral 66 16 95 %   02/27/20 0315 125/78 97.5 °F (36.4 °C) Oral 70 16 92 %     I/O last 3 completed shifts: In: 480 [P.O.:480]  Out: 1470 [Urine:1400; Drains:70]  I/O this shift:  In: -   Out: 225 [Urine:225]    EXAM:  GENERAL: No apparent distress. HEENT: Membranes moist, no oral lesion  NECK:  Supple, no lymphadenopathy  LUNGS: Clear b/l, no rales, no dullness  CARDIAC: RRR, no murmur appreciated  ABD:  + BS, soft / NT  EXT:  No rash, no edema, no lesions - L knee with dressing, in brace, drain out  NEURO: No focal neurologic findings  PSYCH: Orientation, sensorium, mood normal  LINES:  L PICC in place       Data Review:  Lab Results   Component Value Date    WBC 6.3 02/25/2020    HGB 10.7 (L) 02/26/2020    HCT 32.3 (L) 02/26/2020    MCV 93.3 02/25/2020     02/25/2020     Lab Results   Component Value Date    CREATININE 0.9 02/27/2020    BUN 13 02/27/2020     02/27/2020    K 3.4 (L) 02/27/2020    CL 97 (L) 02/27/2020    CO2 31 02/27/2020       Hepatic Function Panel:   Lab Results   Component Value Date    ALKPHOS 104 09/22/2014    ALT 33 09/22/2014    AST 26 09/22/2014    PROT 9.0 09/22/2014    PROT 8.1 11/19/2012    BILITOT 0.4 09/22/2014    BILIDIR 0.2 09/21/2014    IBILI 0.1 09/21/2014    LABALBU 4.0 09/23/2014       CRP 8.2, ESR 36     Micro:  2/25 OR - tissue (3), GS no WBC, no organism.  Cult no growth to date     Imaging:   None     Scheduled Meds:   LORazepam  1 mg Oral Q6H    insulin lispro  0-6 Units Subcutaneous TID WC    insulin lispro  0-3 Units Subcutaneous Nightly    cefTRIAXone (ROCEPHIN) IV  2 g Intravenous Q24H CBC w diff, CMP, ESR, CRP, vancomycin trough every Mon or Tue - FAX result to [phone]  - Call with antibiotic / infusion issues, [phone]  - No f/u in outpatient ID office necessary

## 2020-02-27 NOTE — PROGRESS NOTES
Physical Therapy  Facility/Department: [facility] 5T ORTHO/NEURO  Daily Treatment Note  NAME: Gaudencio Adrian  : 1960  MRN: [id number]    Date of Service: 2020    Discharge Recommendations:    Gaudencio Adrian scored a 20/24 on the AM-PAC short mobility form. Current research shows that an AM-PAC score of 18 or greater is typically associated with a discharge to the patient's home setting. Based on the patients AM-PAC score and their current functional mobility deficits, it is recommended that the patient have 2-3 sessions per week of Physical Therapy at d/c to increase the patients independence. PT Equipment Recommendations  Equipment Needed: No    Assessment   Body structures, Functions, Activity limitations: Decreased functional mobility   Assessment: Pt with much improved functional mobility and endurance this session. Pt amb 400' with supervision and RW. Pt negotiating a flight of stairs with supervision and use of HR. Pt planning to d/c home today with A from wife. Will continue to follow. Treatment Diagnosis: impaired functional mobility. Prognosis: Good  Decision Making: Low Complexity  PT Education: Transfer Training;General Safety;Gait Training  Patient Education: LE ther ex  Barriers to Learning: none  REQUIRES PT FOLLOW UP: Yes  Activity Tolerance  Activity Tolerance: Patient Tolerated treatment well     Patient Diagnosis(es): The encounter diagnosis was Status post total left knee replacement. has a past medical history of Acute renal failure ([address] Ankle disorder, Arthritis, Asthma, asthma, COPD (chronic obstructive pulmonary disease) ([address] Diabetes mellitus ([address] GERD (gastroesophageal reflux disease), GERD (gastroesophageal reflux disease), Heart murmur, History of gastritis, Hypertension, Pancreatitis, peripheral neuropathy, Spinal stenosis, spinal stenosis, and Ulcer. has a past surgical history that includes Rotator cuff repair (Bilateral); Cervical spine surgery;  Carpal tunnel release (Left); Nerve Surgery; Colonoscopy; Ankle surgery (Right, [id number]); Foot surgery; Knee Arthroplasty (Left); Revision total knee arthroplasty (Left, 12/17/2019); and Total knee arthroplasty (Left, 2/25/2020). Restrictions  Position Activity Restriction  Other position/activity restrictions: WBAT left LE, no left knee flexion, brace locked in extension  Subjective   General  Chart Reviewed: Yes  Additional Pertinent Hx: PMH: HTN, DM, OA, spinal stenosis, DDD, peripheral neuropathy, neck sx, left TKR & revision x 2. Pt admitted 2/25 for: INCISION AND DRAINAGE, CULTURE AND CLOSURE LEFT TOTAL KNEE ARTHROPLASTY WOUND,  POLY EXCHANGE  Family / Caregiver Present: No  Referring Practitioner: Aaron Butt  Subjective  Subjective: Pt found supine in bed upon arrival, reporting 2/10 pain and agreeable to therapy. Orientation  Orientation  Overall Orientation Status: Within Normal Limits  Objective   Bed mobility  Supine to Sit: Modified independent(HOB slightly raised, use of rail )  Scooting: Independent  Transfers  Sit to Stand: Supervision  Stand to sit: Supervision  Ambulation  Ambulation?: Yes  WB Status: FWBAT  Ambulation 1  Surface: level tile  Device: Rolling Walker  Other Apparatus: Knee Immobilizer(L KI)  Assistance: Supervision  Quality of Gait: moderate danyel, stride length and Yanelis. Overall steady with no LOB or near LOB.    Distance: 400'   Comments: reciprocal gait pattern  Stairs/Curb  Stairs?: Yes  Stairs  # Steps : 12  Stairs Height: 6\"  Rails: Right ascending  Curbs: 6\"  Device: No Device  Assistance: Stand by assistance     Balance  Posture: Good  Sitting - Static: Good  Sitting - Dynamic: Good  Standing - Static: Good  Standing - Dynamic: Good  Exercises  Straight Leg Raise: x 10 left LE  Quad Sets: x 10 bilat  Hip Abduction: x 10 left LE  Ankle Pumps: x 20 bilat     Goals  Short term goals  Time Frame for Short term goals: D/C  Short term goal 1: supine<->sit IND ongoing  Short term goal 2: sit<->stand SUPV MET 2/27 Updated goal: independent   Short term goal 3: Amb 150 ft with RW SBA (met 2/26). new goal: Amb 150 ft with RW SUPV MET 2/27 Updated goal: Mod I  Short term goal 4: pt will tolerate stair assessment (met 2/26). new goal: up/down flight of stairs, bilat rails SBA. MET 2/27 Updated goal: Mod I   Patient Goals   Patient goals : to return home    Plan    Plan  Times per week: 7  Times per day: Twice a day  Current Treatment Recommendations: Transfer Training, Gait Training, Stair training, Strengthening, Safety Education & Training  Safety Devices  Type of devices: Gait belt, Left in chair, Chair alarm in place, Call light within reach, Nurse notified     Therapy Time   Individual Concurrent Group Co-treatment   Time In 21          Time Out 0821         Minutes 23           Timed Code Treatment Minutes:  23    Total Treatment Minutes:  23    If the patient is discharged before the next treatment session, this note will serve as the discharge summary.      Cholo Dwyer, PT, DPT [id number]

## 2020-02-27 NOTE — FLOWSHEET NOTE
02/27/20 1055   Encounter Summary   Services provided to: Patient   Referral/Consult From: [address] [place] [address] Significant other;Family members   Continue Visiting   (Seen 2/27/20 [place])   Complexity of Encounter Moderate   Length of Encounter 30 minutes   Spiritual Assessment Completed Yes   Routine   Type Initial   Assessment Calm; Approachable; Hopeful   Intervention Active listening;Explored feelings, thoughts, concerns;Nurtured hope   Outcome Expressed gratitude;Expressed feelings of gregorio, peace, and/or awe   Spiritual/Amish   Type Spiritual support   Assessment Calm; Approachable; Hopeful   Intervention Active listening;Explored feelings, thoughts, concerns;Nurtured hope   Outcome Expressed gratitude;Expressed feelings of gregorio, peace, and/or awe

## 2020-03-02 LAB
ALBUMIN SERPL-MCNC: 3.2 G/DL
ALP BLD-CCNC: 74 U/L
ALT SERPL-CCNC: 14 U/L
ANION GAP SERPL CALCULATED.3IONS-SCNC: 6 MMOL/L
AST SERPL-CCNC: 19 U/L
BASOPHILS ABSOLUTE: 0.2 /ΜL
BASOPHILS RELATIVE PERCENT: 3 %
BILIRUB SERPL-MCNC: 0.3 MG/DL (ref 0.1–1.4)
BUN BLDV-MCNC: 17 MG/DL
C-REACTIVE PROTEIN: 120.8
CALCIUM SERPL-MCNC: 8.9 MG/DL
CHLORIDE BLD-SCNC: 99 MMOL/L
CO2: 32 MMOL/L
CREAT SERPL-MCNC: 0.89 MG/DL
EOSINOPHILS ABSOLUTE: 0.4 /ΜL
EOSINOPHILS RELATIVE PERCENT: 6 %
GFR CALCULATED: 91
GLUCOSE BLD-MCNC: 158 MG/DL
HCT VFR BLD CALC: 30.7 % (ref 41–53)
HEMOGLOBIN: 10.2 G/DL (ref 13.5–17.5)
LYMPHOCYTES ABSOLUTE: 1.3 /ΜL
LYMPHOCYTES RELATIVE PERCENT: 20 %
MCH RBC QN AUTO: 31 PG
MCHC RBC AUTO-ENTMCNC: 33.2 G/DL
MCV RBC AUTO: 93.6 FL
MONOCYTES ABSOLUTE: 0.5 /ΜL
MONOCYTES RELATIVE PERCENT: 8 %
NEUTROPHILS ABSOLUTE: 4 /ΜL
NEUTROPHILS RELATIVE PERCENT: 63 %
PDW BLD-RTO: 13.8 %
PLATELET # BLD: 261 K/ΜL
PMV BLD AUTO: 9.4 FL
POTASSIUM SERPL-SCNC: 3.8 MMOL/L
RBC # BLD: 3.28 10^6/ΜL
SODIUM BLD-SCNC: 137 MMOL/L
TOTAL PROTEIN: 6.8
WBC # BLD: 6.4 10^3/ML

## 2020-03-05 ENCOUNTER — TELEPHONE (OUTPATIENT)
Dept: INFECTIOUS DISEASES | Age: 60
End: 2020-03-05

## 2020-03-05 NOTE — TELEPHONE ENCOUNTER
Shila called letting Dr. Nallely Finney know that patients Ck was at 224. Just wanted him to be aware and to see if he just wants to watch. Please advise.

## 2020-03-12 ENCOUNTER — TELEPHONE (OUTPATIENT)
Dept: INFECTIOUS DISEASES | Age: 60
End: 2020-03-12

## 2020-03-12 NOTE — TELEPHONE ENCOUNTER
Damien Dang calling from [facility] regarding Patient's abnormal lab results please call back with medication adjustments.

## 2020-03-16 LAB
ANAEROBIC CULTURE: NORMAL
GRAM STAIN RESULT: NORMAL
WOUND/ABSCESS: NORMAL

## 2020-03-30 ENCOUNTER — TELEPHONE (OUTPATIENT)
Dept: INFECTIOUS DISEASES | Age: 60
End: 2020-03-30

## 2020-03-30 LAB
FUNGUS (MYCOLOGY) CULTURE: NORMAL
FUNGUS STAIN: NORMAL

## 2020-04-06 ENCOUNTER — TELEPHONE (OUTPATIENT)
Dept: INFECTIOUS DISEASES | Age: 60
End: 2020-04-06

## 2020-04-22 ENCOUNTER — TELEPHONE (OUTPATIENT)
Dept: INFECTIOUS DISEASES | Age: 60
End: 2020-04-22

## 2020-06-24 ENCOUNTER — OFFICE VISIT (OUTPATIENT)
Dept: PRIMARY CARE CLINIC | Age: 60
End: 2020-06-24
Payer: COMMERCIAL

## 2020-06-24 PROCEDURE — G8419 CALC BMI OUT NRM PARAM NOF/U: HCPCS | Performed by: NURSE PRACTITIONER

## 2020-06-24 PROCEDURE — G8428 CUR MEDS NOT DOCUMENT: HCPCS | Performed by: NURSE PRACTITIONER

## 2020-06-24 PROCEDURE — 1036F TOBACCO NON-USER: CPT | Performed by: NURSE PRACTITIONER

## 2020-06-24 PROCEDURE — 99213 OFFICE O/P EST LOW 20 MIN: CPT | Performed by: NURSE PRACTITIONER

## 2020-06-24 PROCEDURE — 3017F COLORECTAL CA SCREEN DOC REV: CPT | Performed by: NURSE PRACTITIONER

## 2020-06-24 NOTE — PROGRESS NOTES
FLU/COVID-19 CLINIC EVALUATION  HPI: Patient is in office today with concern for a known close exposure to COVID-19 Virus. The patient is requesting screening evaluation and COVID-19 testing. No other concerns      Symptom duration, days:  [] 1   [] 2    []3   [] 4    []5    []6   [] 7    []8    []9   [] 10    []11 []  12   [] 13    []14 or greater    There were no vitals filed for this visit. ROS:  All systems reviewed and are negative unless marked. Constitutional: []Fevers []Chills   HENT: []Nasal Congestion []Loss of taste/smell []Sore throat   Respiratory: []Cough []Chest Congestion []Chest Congestion []Feeling short of breath  Cardiovascular: []Chest pain/heaviness  Gastrointestinal: []Nausea []Vomiting []Diarrhea  Musculoskeletal: []Muscle aches [] Fatigue   Neurological: []Headaches    OTHER SYMPTOMS:      RISK FACTORS:  []Pregnancy   []Diabetes  []Heart disease  []Asthma  []COPD/Other chronic lung diseases  []Active Cancer/Chemotherapy   []Taking oral steroids  [x]Close contact with a lab confirmed COVID-19 patient within 14 days of symptom onset  []Health Care Worker Exposure no symptoms  []Health Care Worker Exposure symptomatic    Assessment  Physical Exam    Constitutional:       Appearance: Normal appearance. HENT:      Head: Normocephalic and atraumatic. Mouth/Throat:      Mouth: Mucous membranes are moist.   Neck:      Musculoskeletal: Normal range of motion. Cardiovascular:     Skin pink and warm     Pulmonary:      Effort: Pulmonary effort is normal.   Musculoskeletal: Normal range of motion. Skin:     General: Skin is warm and dry. Neurological:      General: No focal deficit present. Mental Status: Alert. Mental status is at baseline. Test ordered:    [x]COVID    _________  []Strep    ___________      Diagnosis and Plan:      Diagnosis Orders   1.  Suspected COVID-19 virus infection  Covid-19 Ambulatory     COVID-19 swab complete at clinic and sent to lab for

## 2020-06-26 LAB
SARS-COV-2: NOT DETECTED
SOURCE: NORMAL

## 2022-03-22 ENCOUNTER — HOSPITAL ENCOUNTER (OUTPATIENT)
Dept: CT IMAGING | Age: 62
Discharge: HOME | End: 2022-03-22
Payer: COMMERCIAL

## 2022-03-22 DIAGNOSIS — N40.1 ENLARGED PROSTATE WITH URINARY OBSTRUCTION: ICD-10-CM

## 2022-03-22 DIAGNOSIS — N13.8 ENLARGED PROSTATE WITH URINARY OBSTRUCTION: ICD-10-CM

## 2022-03-22 PROCEDURE — 6360000004 HC RX CONTRAST MEDICATION: Performed by: PHYSICIAN ASSISTANT

## 2022-03-22 PROCEDURE — 72193 CT PELVIS W/DYE: CPT

## 2022-03-22 RX ADMIN — IOPAMIDOL 75 ML: 755 INJECTION, SOLUTION INTRAVENOUS at 10:30

## 2022-08-30 NOTE — PROGRESS NOTES
lymphadenopathy  LUNGS:  No increased work of breathing, good air exchange, clear to auscultation bilaterally, no crackles or wheezing  CARDIOVASCULAR: regular rate and rhythm, S1, S2 normal, no murmur, click, rub or gallop  ABDOMEN:  soft, non-tender, without masses or organomegaly  MUSCULOSKELETAL:  There is no redness, warmth, or swelling of the joints. Full range of motion noted. Motor strength is 5 out of 5 all extremities bilaterally. Tone is normal.  SKIN:  normal skin color, texture, turgor    Data      CBC:   Lab Results   Component Value Date    WBC 6.2 12/12/2019    RBC 4.40 12/12/2019    HGB 13.0 12/18/2019    HCT 38.9 12/18/2019    MCV 94.8 12/12/2019    MCH 31.6 12/12/2019    MCHC 33.4 12/12/2019    RDW 14.6 12/12/2019     12/12/2019    MPV 9.1 12/12/2019     CMP:    Lab Results   Component Value Date     12/18/2019    K 3.4 12/18/2019    CL 96 12/18/2019    CO2 28 12/18/2019    BUN 12 12/18/2019    CREATININE 0.8 12/18/2019    GFRAA >60 12/18/2019    GFRAA >60 11/19/2012    AGRATIO 1.0 09/22/2014    LABGLOM >60 12/18/2019    GLUCOSE 115 12/18/2019    PROT 9.0 09/22/2014    PROT 8.1 11/19/2012    LABALBU 4.0 09/23/2014    CALCIUM 9.4 12/18/2019    BILITOT 0.4 09/22/2014    ALKPHOS 104 09/22/2014    AST 26 09/22/2014    ALT 33 09/22/2014         ASSESSMENT AND PLAN      Principal Problem:    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 Continue present medications. Asthma  Plan: This problem is stable. Continue present medications. Diabetes mellitus ([address]  Plan: This problem is stable. Continue present medications. COPD (chronic obstructive pulmonary disease) ([address]  Plan: This problem is stable. Continue present medications.   If patient to be discharged to follow up with PCP in 2 to 4 weeks Attending Attestation (For Attendings USE Only)...

## 2023-04-06 RX ORDER — FOLIC ACID 1 MG/1
1 TABLET ORAL 2 TIMES DAILY
COMMUNITY
Start: 2022-12-21

## 2023-04-06 RX ORDER — ALBUTEROL SULFATE 90 UG/1
2 INHALANT RESPIRATORY (INHALATION)
COMMUNITY

## 2023-04-06 RX ORDER — ADALIMUMAB 40MG/0.4ML
KIT SUBCUTANEOUS
COMMUNITY
Start: 2022-10-13

## 2023-04-06 RX ORDER — HYDROXYCHLOROQUINE SULFATE 200 MG/1
200 TABLET, FILM COATED ORAL 2 TIMES DAILY
COMMUNITY
Start: 2022-12-21

## 2023-04-06 RX ORDER — VALSARTAN 320 MG/1
320 TABLET ORAL DAILY
COMMUNITY
Start: 2021-11-20

## 2023-04-06 RX ORDER — TADALAFIL 20 MG/1
TABLET ORAL
COMMUNITY
Start: 2021-10-09

## 2023-04-06 RX ORDER — HYDROCHLOROTHIAZIDE 25 MG/1
25 TABLET ORAL DAILY
COMMUNITY
Start: 2021-08-03

## 2023-04-06 RX ORDER — AMLODIPINE BESYLATE 10 MG/1
10 TABLET ORAL DAILY
COMMUNITY

## 2023-04-10 ENCOUNTER — APPOINTMENT (OUTPATIENT)
Dept: GENERAL RADIOLOGY | Age: 63
DRG: 455 | End: 2023-04-10
Attending: NEUROLOGICAL SURGERY
Payer: COMMERCIAL

## 2023-04-10 ENCOUNTER — APPOINTMENT (OUTPATIENT)
Dept: CT IMAGING | Age: 63
DRG: 455 | End: 2023-04-10
Attending: NEUROLOGICAL SURGERY
Payer: COMMERCIAL

## 2023-04-10 ENCOUNTER — HOSPITAL ENCOUNTER (INPATIENT)
Age: 63
LOS: 3 days | Discharge: HOME HEALTH | DRG: 455 | End: 2023-04-13
Attending: NEUROLOGICAL SURGERY | Admitting: NEUROLOGICAL SURGERY
Payer: COMMERCIAL

## 2023-04-10 DIAGNOSIS — Z98.1 S/P LUMBAR FUSION: Primary | ICD-10-CM

## 2023-04-10 PROBLEM — M48.062 LUMBAR STENOSIS WITH NEUROGENIC CLAUDICATION: Status: ACTIVE | Noted: 2023-04-10

## 2023-04-10 LAB
ABO + RH BLD: NORMAL
ANION GAP SERPL CALCULATED.3IONS-SCNC: 9 MMOL/L (ref 3–16)
BLD GP AB SCN SERPL QL: NORMAL
BUN SERPL-MCNC: 12 MG/DL (ref 7–20)
CALCIUM SERPL-MCNC: 9.6 MG/DL (ref 8.3–10.6)
CHLORIDE SERPL-SCNC: 97 MMOL/L (ref 99–110)
CO2 SERPL-SCNC: 30 MMOL/L (ref 21–32)
CREAT SERPL-MCNC: 0.9 MG/DL (ref 0.8–1.3)
GFR SERPLBLD CREATININE-BSD FMLA CKD-EPI: >60 ML/MIN/{1.73_M2}
GLUCOSE BLD-MCNC: 125 MG/DL (ref 70–99)
GLUCOSE BLD-MCNC: 133 MG/DL (ref 70–99)
GLUCOSE BLD-MCNC: 192 MG/DL (ref 70–99)
GLUCOSE BLD-MCNC: 44 MG/DL (ref 70–99)
GLUCOSE BLD-MCNC: 96 MG/DL (ref 70–99)
GLUCOSE BLD-MCNC: 97 MG/DL (ref 70–99)
GLUCOSE SERPL-MCNC: 96 MG/DL (ref 70–99)
PERFORMED ON: ABNORMAL
PERFORMED ON: NORMAL
PERFORMED ON: NORMAL
POTASSIUM SERPL-SCNC: 3.3 MMOL/L (ref 3.5–5.1)
SODIUM SERPL-SCNC: 136 MMOL/L (ref 136–145)

## 2023-04-10 PROCEDURE — 0SG00A0 FUSION OF LUMBAR VERTEBRAL JOINT WITH INTERBODY FUSION DEVICE, ANTERIOR APPROACH, ANTERIOR COLUMN, OPEN APPROACH: ICD-10-PCS | Performed by: NEUROLOGICAL SURGERY

## 2023-04-10 PROCEDURE — 2700000000 HC OXYGEN THERAPY PER DAY

## 2023-04-10 PROCEDURE — 6360000002 HC RX W HCPCS: Performed by: NEUROLOGICAL SURGERY

## 2023-04-10 PROCEDURE — C1821 INTERSPINOUS IMPLANT: HCPCS | Performed by: NEUROLOGICAL SURGERY

## 2023-04-10 PROCEDURE — 2500000003 HC RX 250 WO HCPCS: Performed by: NEUROLOGICAL SURGERY

## 2023-04-10 PROCEDURE — 0QP004Z REMOVAL OF INTERNAL FIXATION DEVICE FROM LUMBAR VERTEBRA, OPEN APPROACH: ICD-10-PCS | Performed by: NEUROLOGICAL SURGERY

## 2023-04-10 PROCEDURE — 3700000001 HC ADD 15 MINUTES (ANESTHESIA): Performed by: NEUROLOGICAL SURGERY

## 2023-04-10 PROCEDURE — 1200000000 HC SEMI PRIVATE

## 2023-04-10 PROCEDURE — 2720000010 HC SURG SUPPLY STERILE: Performed by: NEUROLOGICAL SURGERY

## 2023-04-10 PROCEDURE — 94761 N-INVAS EAR/PLS OXIMETRY MLT: CPT

## 2023-04-10 PROCEDURE — 6360000002 HC RX W HCPCS: Performed by: NURSE PRACTITIONER

## 2023-04-10 PROCEDURE — 00NY0ZZ RELEASE LUMBAR SPINAL CORD, OPEN APPROACH: ICD-10-PCS | Performed by: NEUROLOGICAL SURGERY

## 2023-04-10 PROCEDURE — 2580000003 HC RX 258: Performed by: NURSE PRACTITIONER

## 2023-04-10 PROCEDURE — 2709999900 HC NON-CHARGEABLE SUPPLY: Performed by: NEUROLOGICAL SURGERY

## 2023-04-10 PROCEDURE — 3700000000 HC ANESTHESIA ATTENDED CARE: Performed by: NEUROLOGICAL SURGERY

## 2023-04-10 PROCEDURE — 0SG1071 FUSION OF 2 OR MORE LUMBAR VERTEBRAL JOINTS WITH AUTOLOGOUS TISSUE SUBSTITUTE, POSTERIOR APPROACH, POSTERIOR COLUMN, OPEN APPROACH: ICD-10-PCS | Performed by: NEUROLOGICAL SURGERY

## 2023-04-10 PROCEDURE — 86850 RBC ANTIBODY SCREEN: CPT

## 2023-04-10 PROCEDURE — 7100000001 HC PACU RECOVERY - ADDTL 15 MIN: Performed by: NEUROLOGICAL SURGERY

## 2023-04-10 PROCEDURE — 86901 BLOOD TYPING SEROLOGIC RH(D): CPT

## 2023-04-10 PROCEDURE — 86900 BLOOD TYPING SEROLOGIC ABO: CPT

## 2023-04-10 PROCEDURE — 01NB0ZZ RELEASE LUMBAR NERVE, OPEN APPROACH: ICD-10-PCS | Performed by: NEUROLOGICAL SURGERY

## 2023-04-10 PROCEDURE — 0QP104Z REMOVAL OF INTERNAL FIXATION DEVICE FROM SACRUM, OPEN APPROACH: ICD-10-PCS | Performed by: NEUROLOGICAL SURGERY

## 2023-04-10 PROCEDURE — 3600000004 HC SURGERY LEVEL 4 BASE: Performed by: NEUROLOGICAL SURGERY

## 2023-04-10 PROCEDURE — 2580000003 HC RX 258: Performed by: FAMILY MEDICINE

## 2023-04-10 PROCEDURE — C9359 IMPLNT,BON VOID FILLER-PUTTY: HCPCS | Performed by: NEUROLOGICAL SURGERY

## 2023-04-10 PROCEDURE — C1713 ANCHOR/SCREW BN/BN,TIS/BN: HCPCS | Performed by: NEUROLOGICAL SURGERY

## 2023-04-10 PROCEDURE — 72131 CT LUMBAR SPINE W/O DYE: CPT

## 2023-04-10 PROCEDURE — 77011 CT SCAN FOR LOCALIZATION: CPT

## 2023-04-10 PROCEDURE — 3209999900 FLUORO FOR SURGICAL PROCEDURES

## 2023-04-10 PROCEDURE — 2580000003 HC RX 258: Performed by: NEUROLOGICAL SURGERY

## 2023-04-10 PROCEDURE — 8E0WXBG COMPUTER ASSISTED PROCEDURE OF TRUNK REGION, WITH COMPUTERIZED TOMOGRAPHY: ICD-10-PCS | Performed by: NEUROLOGICAL SURGERY

## 2023-04-10 PROCEDURE — 6360000002 HC RX W HCPCS: Performed by: ANESTHESIOLOGY

## 2023-04-10 PROCEDURE — A4217 STERILE WATER/SALINE, 500 ML: HCPCS | Performed by: NEUROLOGICAL SURGERY

## 2023-04-10 PROCEDURE — 7100000000 HC PACU RECOVERY - FIRST 15 MIN: Performed by: NEUROLOGICAL SURGERY

## 2023-04-10 PROCEDURE — 3600000014 HC SURGERY LEVEL 4 ADDTL 15MIN: Performed by: NEUROLOGICAL SURGERY

## 2023-04-10 PROCEDURE — 0SG3071 FUSION OF LUMBOSACRAL JOINT WITH AUTOLOGOUS TISSUE SUBSTITUTE, POSTERIOR APPROACH, POSTERIOR COLUMN, OPEN APPROACH: ICD-10-PCS | Performed by: NEUROLOGICAL SURGERY

## 2023-04-10 PROCEDURE — 6370000000 HC RX 637 (ALT 250 FOR IP): Performed by: NURSE PRACTITIONER

## 2023-04-10 PROCEDURE — C9290 INJ, BUPIVACAINE LIPOSOME: HCPCS | Performed by: NEUROLOGICAL SURGERY

## 2023-04-10 PROCEDURE — 0SB20ZZ EXCISION OF LUMBAR VERTEBRAL DISC, OPEN APPROACH: ICD-10-PCS | Performed by: NEUROLOGICAL SURGERY

## 2023-04-10 PROCEDURE — 2580000003 HC RX 258: Performed by: ANESTHESIOLOGY

## 2023-04-10 PROCEDURE — 80048 BASIC METABOLIC PNL TOTAL CA: CPT

## 2023-04-10 PROCEDURE — 72100 X-RAY EXAM L-S SPINE 2/3 VWS: CPT

## 2023-04-10 DEVICE — SCREW SPNL POLYAX 7X50 MM FOR 5.5 MM ROD TI EXPEDIUM VERSE: Type: IMPLANTABLE DEVICE | Status: FUNCTIONAL

## 2023-04-10 DEVICE — SCREW SPNL POLYAX 7X45 MM FOR 5.5 MM ROD TI EXPEDIUM VERSE: Type: IMPLANTABLE DEVICE | Status: FUNCTIONAL

## 2023-04-10 DEVICE — BONE GRFT SUB L 12.5CC BIOACTIVE GLS MTRX: Type: IMPLANTABLE DEVICE | Status: FUNCTIONAL

## 2023-04-10 DEVICE — SCREW SPNL POLYAX 5.5X6X50 MM TI EXPEDIUM VERSE: Type: IMPLANTABLE DEVICE | Status: FUNCTIONAL

## 2023-04-10 DEVICE — IMPLANTABLE DEVICE: Type: IMPLANTABLE DEVICE | Status: FUNCTIONAL

## 2023-04-10 DEVICE — BONE GRAFT KIT 7510100 INFUSE X SMALL
Type: IMPLANTABLE DEVICE | Status: FUNCTIONAL
Brand: INFUSE® BONE GRAFT

## 2023-04-10 DEVICE — SCREW SET UNITZ 5.5 MM TI EXPEDIUM VERSE: Type: IMPLANTABLE DEVICE | Status: FUNCTIONAL

## 2023-04-10 DEVICE — SCREW SPNL POLYAX 5.5X6X45 MM TI EXPEDIUM VERSE: Type: IMPLANTABLE DEVICE | Status: FUNCTIONAL

## 2023-04-10 RX ORDER — LABETALOL HYDROCHLORIDE 5 MG/ML
10 INJECTION, SOLUTION INTRAVENOUS
Status: DISCONTINUED | OUTPATIENT
Start: 2023-04-10 | End: 2023-04-10 | Stop reason: HOSPADM

## 2023-04-10 RX ORDER — SODIUM CHLORIDE 0.9 % (FLUSH) 0.9 %
5-40 SYRINGE (ML) INJECTION EVERY 12 HOURS SCHEDULED
Status: DISCONTINUED | OUTPATIENT
Start: 2023-04-10 | End: 2023-04-10 | Stop reason: HOSPADM

## 2023-04-10 RX ORDER — SODIUM CHLORIDE 9 MG/ML
INJECTION, SOLUTION INTRAVENOUS PRN
Status: DISCONTINUED | OUTPATIENT
Start: 2023-04-10 | End: 2023-04-10 | Stop reason: HOSPADM

## 2023-04-10 RX ORDER — VENLAFAXINE HYDROCHLORIDE 150 MG/1
150 CAPSULE, EXTENDED RELEASE ORAL DAILY
Status: DISCONTINUED | OUTPATIENT
Start: 2023-04-10 | End: 2023-04-13 | Stop reason: HOSPADM

## 2023-04-10 RX ORDER — CETIRIZINE HYDROCHLORIDE, PSEUDOEPHEDRINE HYDROCHLORIDE 5; 120 MG/1; MG/1
1 TABLET, FILM COATED, EXTENDED RELEASE ORAL DAILY PRN
Status: DISCONTINUED | OUTPATIENT
Start: 2023-04-10 | End: 2023-04-13 | Stop reason: HOSPADM

## 2023-04-10 RX ORDER — DIAZEPAM 5 MG/1
5 TABLET ORAL EVERY 6 HOURS PRN
Status: DISCONTINUED | OUTPATIENT
Start: 2023-04-10 | End: 2023-04-13 | Stop reason: HOSPADM

## 2023-04-10 RX ORDER — BISACODYL 5 MG/1
5 TABLET, DELAYED RELEASE ORAL DAILY PRN
Status: DISCONTINUED | OUTPATIENT
Start: 2023-04-10 | End: 2023-04-13 | Stop reason: HOSPADM

## 2023-04-10 RX ORDER — VANCOMYCIN HYDROCHLORIDE 1 G/20ML
INJECTION, POWDER, LYOPHILIZED, FOR SOLUTION INTRAVENOUS PRN
Status: DISCONTINUED | OUTPATIENT
Start: 2023-04-10 | End: 2023-04-10 | Stop reason: HOSPADM

## 2023-04-10 RX ORDER — TAMSULOSIN HYDROCHLORIDE 0.4 MG/1
0.4 CAPSULE ORAL DAILY
Status: DISCONTINUED | OUTPATIENT
Start: 2023-04-10 | End: 2023-04-13 | Stop reason: HOSPADM

## 2023-04-10 RX ORDER — SODIUM CHLORIDE 0.9 % (FLUSH) 0.9 %
5-40 SYRINGE (ML) INJECTION PRN
Status: DISCONTINUED | OUTPATIENT
Start: 2023-04-10 | End: 2023-04-13 | Stop reason: HOSPADM

## 2023-04-10 RX ORDER — GLUCAGON 1 MG/ML
1 KIT INJECTION PRN
Status: DISCONTINUED | OUTPATIENT
Start: 2023-04-10 | End: 2023-04-13 | Stop reason: HOSPADM

## 2023-04-10 RX ORDER — ENOXAPARIN SODIUM 100 MG/ML
40 INJECTION SUBCUTANEOUS DAILY
Status: DISCONTINUED | OUTPATIENT
Start: 2023-04-11 | End: 2023-04-13 | Stop reason: HOSPADM

## 2023-04-10 RX ORDER — PRAVASTATIN SODIUM 20 MG
20 TABLET ORAL DAILY
Status: DISCONTINUED | OUTPATIENT
Start: 2023-04-10 | End: 2023-04-13 | Stop reason: HOSPADM

## 2023-04-10 RX ORDER — METHOCARBAMOL 750 MG/1
750 TABLET, FILM COATED ORAL EVERY 6 HOURS SCHEDULED
Status: DISCONTINUED | OUTPATIENT
Start: 2023-04-11 | End: 2023-04-13 | Stop reason: HOSPADM

## 2023-04-10 RX ORDER — OXYCODONE HYDROCHLORIDE 5 MG/1
5 TABLET ORAL EVERY 4 HOURS PRN
Status: DISCONTINUED | OUTPATIENT
Start: 2023-04-10 | End: 2023-04-13 | Stop reason: HOSPADM

## 2023-04-10 RX ORDER — AMLODIPINE BESYLATE 10 MG/1
10 TABLET ORAL DAILY
Status: DISCONTINUED | OUTPATIENT
Start: 2023-04-10 | End: 2023-04-10

## 2023-04-10 RX ORDER — NALOXONE HYDROCHLORIDE 0.4 MG/ML
0.2 INJECTION, SOLUTION INTRAMUSCULAR; INTRAVENOUS; SUBCUTANEOUS PRN
Status: DISCONTINUED | OUTPATIENT
Start: 2023-04-10 | End: 2023-04-13 | Stop reason: HOSPADM

## 2023-04-10 RX ORDER — LIDOCAINE 4 G/G
1 PATCH TOPICAL EVERY 12 HOURS
Status: DISCONTINUED | OUTPATIENT
Start: 2023-04-10 | End: 2023-04-13 | Stop reason: HOSPADM

## 2023-04-10 RX ORDER — INSULIN LISPRO 100 [IU]/ML
0-4 INJECTION, SOLUTION INTRAVENOUS; SUBCUTANEOUS NIGHTLY
Status: DISCONTINUED | OUTPATIENT
Start: 2023-04-10 | End: 2023-04-13 | Stop reason: HOSPADM

## 2023-04-10 RX ORDER — MEPERIDINE HYDROCHLORIDE 25 MG/ML
12.5 INJECTION INTRAMUSCULAR; INTRAVENOUS; SUBCUTANEOUS EVERY 5 MIN PRN
Status: DISCONTINUED | OUTPATIENT
Start: 2023-04-10 | End: 2023-04-10 | Stop reason: HOSPADM

## 2023-04-10 RX ORDER — DEXTROSE, SODIUM CHLORIDE, SODIUM LACTATE, POTASSIUM CHLORIDE, AND CALCIUM CHLORIDE 5; .6; .31; .03; .02 G/100ML; G/100ML; G/100ML; G/100ML; G/100ML
INJECTION, SOLUTION INTRAVENOUS CONTINUOUS
Status: DISCONTINUED | OUTPATIENT
Start: 2023-04-10 | End: 2023-04-10 | Stop reason: HOSPADM

## 2023-04-10 RX ORDER — SENNA AND DOCUSATE SODIUM 50; 8.6 MG/1; MG/1
1 TABLET, FILM COATED ORAL 2 TIMES DAILY
Status: DISCONTINUED | OUTPATIENT
Start: 2023-04-10 | End: 2023-04-13 | Stop reason: HOSPADM

## 2023-04-10 RX ORDER — PROCHLORPERAZINE EDISYLATE 5 MG/ML
5 INJECTION INTRAMUSCULAR; INTRAVENOUS
Status: DISCONTINUED | OUTPATIENT
Start: 2023-04-10 | End: 2023-04-10 | Stop reason: HOSPADM

## 2023-04-10 RX ORDER — SODIUM CHLORIDE 0.9 % (FLUSH) 0.9 %
5-40 SYRINGE (ML) INJECTION PRN
Status: DISCONTINUED | OUTPATIENT
Start: 2023-04-10 | End: 2023-04-10 | Stop reason: HOSPADM

## 2023-04-10 RX ORDER — HYDRALAZINE HYDROCHLORIDE 20 MG/ML
10 INJECTION INTRAMUSCULAR; INTRAVENOUS
Status: DISCONTINUED | OUTPATIENT
Start: 2023-04-10 | End: 2023-04-10 | Stop reason: HOSPADM

## 2023-04-10 RX ORDER — ACETAMINOPHEN 325 MG/1
650 TABLET ORAL EVERY 6 HOURS
Status: DISCONTINUED | OUTPATIENT
Start: 2023-04-10 | End: 2023-04-13 | Stop reason: HOSPADM

## 2023-04-10 RX ORDER — INSULIN LISPRO 100 [IU]/ML
0-4 INJECTION, SOLUTION INTRAVENOUS; SUBCUTANEOUS
Status: DISCONTINUED | OUTPATIENT
Start: 2023-04-10 | End: 2023-04-13 | Stop reason: HOSPADM

## 2023-04-10 RX ORDER — AMLODIPINE BESYLATE 10 MG/1
10 TABLET ORAL DAILY
Status: DISCONTINUED | OUTPATIENT
Start: 2023-04-11 | End: 2023-04-13 | Stop reason: HOSPADM

## 2023-04-10 RX ORDER — AMLODIPINE, VALSARTAN AND HYDROCHLOROTHIAZIDE 10; 320; 25 MG/1; MG/1; MG/1
10 TABLET ORAL DAILY
Status: DISCONTINUED | OUTPATIENT
Start: 2023-04-10 | End: 2023-04-10 | Stop reason: ALTCHOICE

## 2023-04-10 RX ORDER — SODIUM CHLORIDE 9 MG/ML
INJECTION, SOLUTION INTRAVENOUS CONTINUOUS
Status: DISCONTINUED | OUTPATIENT
Start: 2023-04-10 | End: 2023-04-11

## 2023-04-10 RX ORDER — ONDANSETRON 2 MG/ML
4 INJECTION INTRAMUSCULAR; INTRAVENOUS EVERY 6 HOURS PRN
Status: DISCONTINUED | OUTPATIENT
Start: 2023-04-10 | End: 2023-04-13 | Stop reason: HOSPADM

## 2023-04-10 RX ORDER — DEXTROSE MONOHYDRATE 100 MG/ML
INJECTION, SOLUTION INTRAVENOUS CONTINUOUS PRN
Status: DISCONTINUED | OUTPATIENT
Start: 2023-04-10 | End: 2023-04-13 | Stop reason: HOSPADM

## 2023-04-10 RX ORDER — ONDANSETRON 2 MG/ML
4 INJECTION INTRAMUSCULAR; INTRAVENOUS
Status: DISCONTINUED | OUTPATIENT
Start: 2023-04-10 | End: 2023-04-10 | Stop reason: HOSPADM

## 2023-04-10 RX ORDER — POLYETHYLENE GLYCOL 3350 17 G/17G
17 POWDER, FOR SOLUTION ORAL DAILY
Status: DISCONTINUED | OUTPATIENT
Start: 2023-04-10 | End: 2023-04-13 | Stop reason: HOSPADM

## 2023-04-10 RX ORDER — SODIUM CHLORIDE 9 MG/ML
INJECTION, SOLUTION INTRAVENOUS PRN
Status: DISCONTINUED | OUTPATIENT
Start: 2023-04-10 | End: 2023-04-13 | Stop reason: HOSPADM

## 2023-04-10 RX ORDER — SODIUM CHLORIDE 0.9 % (FLUSH) 0.9 %
5-40 SYRINGE (ML) INJECTION EVERY 12 HOURS SCHEDULED
Status: DISCONTINUED | OUTPATIENT
Start: 2023-04-10 | End: 2023-04-13 | Stop reason: HOSPADM

## 2023-04-10 RX ORDER — HYDROCHLOROTHIAZIDE 25 MG/1
25 TABLET ORAL DAILY
Status: DISCONTINUED | OUTPATIENT
Start: 2023-04-10 | End: 2023-04-13 | Stop reason: HOSPADM

## 2023-04-10 RX ORDER — VALSARTAN 320 MG/1
320 TABLET ORAL DAILY
Status: DISCONTINUED | OUTPATIENT
Start: 2023-04-10 | End: 2023-04-13 | Stop reason: HOSPADM

## 2023-04-10 RX ORDER — SODIUM CHLORIDE, SODIUM LACTATE, POTASSIUM CHLORIDE, CALCIUM CHLORIDE 600; 310; 30; 20 MG/100ML; MG/100ML; MG/100ML; MG/100ML
INJECTION, SOLUTION INTRAVENOUS CONTINUOUS
Status: DISCONTINUED | OUTPATIENT
Start: 2023-04-10 | End: 2023-04-10 | Stop reason: HOSPADM

## 2023-04-10 RX ORDER — ONDANSETRON 4 MG/1
4 TABLET, ORALLY DISINTEGRATING ORAL EVERY 8 HOURS PRN
Status: DISCONTINUED | OUTPATIENT
Start: 2023-04-10 | End: 2023-04-13 | Stop reason: HOSPADM

## 2023-04-10 RX ORDER — HYDROXYCHLOROQUINE SULFATE 200 MG/1
200 TABLET, FILM COATED ORAL DAILY
Status: DISCONTINUED | OUTPATIENT
Start: 2023-04-11 | End: 2023-04-11

## 2023-04-10 RX ORDER — OXYCODONE HYDROCHLORIDE 5 MG/1
10 TABLET ORAL EVERY 4 HOURS PRN
Status: DISCONTINUED | OUTPATIENT
Start: 2023-04-10 | End: 2023-04-13 | Stop reason: HOSPADM

## 2023-04-10 RX ADMIN — SODIUM CHLORIDE: 9 INJECTION, SOLUTION INTRAVENOUS at 17:24

## 2023-04-10 RX ADMIN — SODIUM CHLORIDE: 9 INJECTION, SOLUTION INTRAVENOUS at 22:37

## 2023-04-10 RX ADMIN — MEPERIDINE HYDROCHLORIDE 12.5 MG: 25 INJECTION INTRAMUSCULAR; INTRAVENOUS; SUBCUTANEOUS at 12:22

## 2023-04-10 RX ADMIN — HYDROMORPHONE HYDROCHLORIDE 0.5 MG: 1 INJECTION, SOLUTION INTRAMUSCULAR; INTRAVENOUS; SUBCUTANEOUS at 13:48

## 2023-04-10 RX ADMIN — POLYETHYLENE GLYCOL 3350 17 G: 17 POWDER, FOR SOLUTION ORAL at 16:16

## 2023-04-10 RX ADMIN — PRAVASTATIN SODIUM 20 MG: 20 TABLET ORAL at 16:15

## 2023-04-10 RX ADMIN — DIAZEPAM 5 MG: 5 TABLET ORAL at 16:14

## 2023-04-10 RX ADMIN — HYDROMORPHONE HYDROCHLORIDE 0.5 MG: 1 INJECTION, SOLUTION INTRAMUSCULAR; INTRAVENOUS; SUBCUTANEOUS at 13:05

## 2023-04-10 RX ADMIN — VALSARTAN 320 MG: 320 TABLET, FILM COATED ORAL at 19:01

## 2023-04-10 RX ADMIN — OXYCODONE HYDROCHLORIDE 10 MG: 5 TABLET ORAL at 17:14

## 2023-04-10 RX ADMIN — METHOCARBAMOL 1000 MG: 100 INJECTION INTRAMUSCULAR; INTRAVENOUS at 17:29

## 2023-04-10 RX ADMIN — METHOCARBAMOL 1000 MG: 100 INJECTION INTRAMUSCULAR; INTRAVENOUS at 23:45

## 2023-04-10 RX ADMIN — TAMSULOSIN HYDROCHLORIDE 0.4 MG: 0.4 CAPSULE ORAL at 16:15

## 2023-04-10 RX ADMIN — OXYCODONE HYDROCHLORIDE 10 MG: 5 TABLET ORAL at 21:00

## 2023-04-10 RX ADMIN — HYDROMORPHONE HYDROCHLORIDE 0.5 MG: 1 INJECTION, SOLUTION INTRAMUSCULAR; INTRAVENOUS; SUBCUTANEOUS at 12:54

## 2023-04-10 RX ADMIN — VENLAFAXINE HYDROCHLORIDE 150 MG: 150 CAPSULE, EXTENDED RELEASE ORAL at 16:15

## 2023-04-10 RX ADMIN — HYDROMORPHONE HYDROCHLORIDE 0.5 MG: 1 INJECTION, SOLUTION INTRAMUSCULAR; INTRAVENOUS; SUBCUTANEOUS at 22:33

## 2023-04-10 RX ADMIN — HYDROMORPHONE HYDROCHLORIDE 0.5 MG: 1 INJECTION, SOLUTION INTRAMUSCULAR; INTRAVENOUS; SUBCUTANEOUS at 15:45

## 2023-04-10 RX ADMIN — SODIUM CHLORIDE, PRESERVATIVE FREE 10 ML: 5 INJECTION INTRAVENOUS at 21:00

## 2023-04-10 RX ADMIN — SODIUM CHLORIDE: 9 INJECTION, SOLUTION INTRAVENOUS at 14:01

## 2023-04-10 RX ADMIN — MEPERIDINE HYDROCHLORIDE 12.5 MG: 25 INJECTION INTRAMUSCULAR; INTRAVENOUS; SUBCUTANEOUS at 12:17

## 2023-04-10 RX ADMIN — SODIUM CHLORIDE, POTASSIUM CHLORIDE, SODIUM LACTATE AND CALCIUM CHLORIDE: 600; 310; 30; 20 INJECTION, SOLUTION INTRAVENOUS at 06:00

## 2023-04-10 RX ADMIN — HYDROMORPHONE HYDROCHLORIDE 0.5 MG: 1 INJECTION, SOLUTION INTRAMUSCULAR; INTRAVENOUS; SUBCUTANEOUS at 13:35

## 2023-04-10 RX ADMIN — HYDROMORPHONE HYDROCHLORIDE 0.5 MG: 1 INJECTION, SOLUTION INTRAMUSCULAR; INTRAVENOUS; SUBCUTANEOUS at 19:28

## 2023-04-10 RX ADMIN — ACETAMINOPHEN 650 MG: 325 TABLET ORAL at 16:15

## 2023-04-10 RX ADMIN — CEFAZOLIN 2000 MG: 2 INJECTION, POWDER, FOR SOLUTION INTRAMUSCULAR; INTRAVENOUS at 16:29

## 2023-04-10 RX ADMIN — DIAZEPAM 5 MG: 5 TABLET ORAL at 22:33

## 2023-04-10 RX ADMIN — SENNOSIDES AND DOCUSATE SODIUM 1 TABLET: 50; 8.6 TABLET ORAL at 21:00

## 2023-04-10 RX ADMIN — SODIUM CHLORIDE, SODIUM LACTATE, POTASSIUM CHLORIDE, CALCIUM CHLORIDE AND DEXTROSE MONOHYDRATE: 5; 600; 310; 30; 20 INJECTION, SOLUTION INTRAVENOUS at 07:00

## 2023-04-10 RX ADMIN — ACETAMINOPHEN 650 MG: 325 TABLET ORAL at 21:00

## 2023-04-10 ASSESSMENT — PAIN DESCRIPTION - ONSET
ONSET: ON-GOING
ONSET: PROGRESSIVE
ONSET: PROGRESSIVE
ONSET: ON-GOING

## 2023-04-10 ASSESSMENT — PAIN SCALES - GENERAL
PAINLEVEL_OUTOF10: 8
PAINLEVEL_OUTOF10: 8
PAINLEVEL_OUTOF10: 6
PAINLEVEL_OUTOF10: 7
PAINLEVEL_OUTOF10: 5
PAINLEVEL_OUTOF10: 8
PAINLEVEL_OUTOF10: 0
PAINLEVEL_OUTOF10: 7
PAINLEVEL_OUTOF10: 7
PAINLEVEL_OUTOF10: 9
PAINLEVEL_OUTOF10: 5
PAINLEVEL_OUTOF10: 8
PAINLEVEL_OUTOF10: 8
PAINLEVEL_OUTOF10: 5

## 2023-04-10 ASSESSMENT — PAIN DESCRIPTION - LOCATION
LOCATION: BACK

## 2023-04-10 ASSESSMENT — PAIN DESCRIPTION - FREQUENCY
FREQUENCY: CONTINUOUS
FREQUENCY: INTERMITTENT
FREQUENCY: CONTINUOUS
FREQUENCY: INTERMITTENT

## 2023-04-10 ASSESSMENT — PAIN DESCRIPTION - DESCRIPTORS
DESCRIPTORS: ACHING
DESCRIPTORS: DISCOMFORT
DESCRIPTORS: DISCOMFORT
DESCRIPTORS: STABBING
DESCRIPTORS: DISCOMFORT
DESCRIPTORS: ACHING;DISCOMFORT

## 2023-04-10 ASSESSMENT — PAIN DESCRIPTION - PAIN TYPE
TYPE: SURGICAL PAIN

## 2023-04-10 ASSESSMENT — PAIN DESCRIPTION - ORIENTATION
ORIENTATION: LOWER
ORIENTATION: MID;LOWER
ORIENTATION: LOWER
ORIENTATION: MID
ORIENTATION: LOWER

## 2023-04-10 ASSESSMENT — PAIN - FUNCTIONAL ASSESSMENT
PAIN_FUNCTIONAL_ASSESSMENT: 0-10
PAIN_FUNCTIONAL_ASSESSMENT: ACTIVITIES ARE NOT PREVENTED
PAIN_FUNCTIONAL_ASSESSMENT: ACTIVITIES ARE NOT PREVENTED

## 2023-04-10 NOTE — PLAN OF CARE
Problem: Pain  Goal: Verbalizes/displays adequate comfort level or baseline comfort level  Outcome: Progressing  Note: Numeric pain scale being used. PRN IV dilaudid and PO valium given. Ice pack and lidocaine patch applied. Will continue to monitor and assess. Problem: Safety - Adult  Goal: Free from fall injury  Outcome: Progressing  Note: Pt resting in bed. Call light and belongings within reach. All fall precautions in place. Will continue to monitor.

## 2023-04-10 NOTE — OP NOTE
Operative Report    PATIENT NAME: Keri Brush OF BIRTH: 1960  MEDICAL RECORD# [id number]  SURGERY DATE: 4/10/2023  SURGEON:  Shahla Hicks MD, PhD  ASSISTANT:    DICTATED BY: Shahla Hicks MD          PRE-OPERATIVE DIAGNOSIS:   1. Severe spondylosis L2-L4, prior L4-S1 fusion  2. 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 Severe foraminal and central stenosis secondary to above, L3-4  4. Kyphoscoliosis     POST-OPERATIVE DIAGNOSIS:   1. Same     SURGEON: Ailin Barboza M.D., Ph.D.      ANESTHESIA: GETA     ESTIMATED BLOOD LOSS: 300 mL     PROCEDURES PERFORMED:  1. Left-sided far lateral approach to the L3-4 interspace for far lateral discectomy. 2. Placement of lateral S[employer] LS PEEK interbody cages packed with Infuse/Fibergraft into the L3-4 interspace  3. L2 and L3 bilateral pedicle screw fixation, replacement/upsize of pedicle screws L4-S1  4. Removal of bilateral screws and rods L4-S1  5. L2 to S1 bilateral posterolateral fusion with autograft, DBM and FiberGraft matrix  6. Computer-assisted stereotactic navigation with AIRO CT  7. SSEP, MEP, and EMG monitoring, pedicle screw stimulation  8. Cammie Paddy Osteotomies Stage 2, bilateral L3-4 complete removal of lamina, medial facets, superior and inferior articulating processes for decompression of neural foramina and central canal and correction of kyphoscoliosis      INDICATIONS:  Mr. Carolin Bey is a 60year old gentleman with adjacent segment degeneration and kyphoscoliosis. He had a history of prior L4-S1 fusion and was suffering from intractable back and leg pain as well as claudication. He has been unable to ambulate independently. Given these findings and the patients symptoms, surgical revision and fusion was recommended.  The patient understands the risks and benefits of the procedure including but not limited to bleeding, infection, worsened neurologic outcome, vascular injury, cerebral spinal fluid leak, pseudoarthrosis,

## 2023-04-10 NOTE — H&P
I saw and examined Tiffanie Meng on 4/10/2023. I have reviewed the pre-operative history and physical and discussed the surgical procedure including the risks. There has been no change to the history or physical in the interval time since consent. The patient experienced a seizure like event over the weekend and was evaluated in the ER. This was thought to be related to hypoglycemia. I discussed this with the patient and his family, they wish to proceed with the planned procedure.      Juan Antonio Gardner MD, PhD  Alexander Chatman  Director, [address], [facility] [address] (c), [phone] (o)

## 2023-04-11 LAB
ALBUMIN SERPL-MCNC: 3.5 G/DL (ref 3.4–5)
ALBUMIN/GLOB SERPL: 1.2 {RATIO} (ref 1.1–2.2)
ALP SERPL-CCNC: 81 U/L (ref 40–129)
ALT SERPL-CCNC: 18 U/L (ref 10–40)
ANION GAP SERPL CALCULATED.3IONS-SCNC: 8 MMOL/L (ref 3–16)
AST SERPL-CCNC: 37 U/L (ref 15–37)
BILIRUB SERPL-MCNC: 0.3 MG/DL (ref 0–1)
BUN SERPL-MCNC: 12 MG/DL (ref 7–20)
CALCIUM SERPL-MCNC: 8.7 MG/DL (ref 8.3–10.6)
CHLORIDE SERPL-SCNC: 102 MMOL/L (ref 99–110)
CO2 SERPL-SCNC: 30 MMOL/L (ref 21–32)
CREAT SERPL-MCNC: 0.9 MG/DL (ref 0.8–1.3)
DEPRECATED RDW RBC AUTO: 13.5 % (ref 12.4–15.4)
GFR SERPLBLD CREATININE-BSD FMLA CKD-EPI: >60 ML/MIN/{1.73_M2}
GLUCOSE BLD-MCNC: 68 MG/DL (ref 70–99)
GLUCOSE BLD-MCNC: 79 MG/DL (ref 70–99)
GLUCOSE BLD-MCNC: 85 MG/DL (ref 70–99)
GLUCOSE BLD-MCNC: 87 MG/DL (ref 70–99)
GLUCOSE BLD-MCNC: 91 MG/DL (ref 70–99)
GLUCOSE SERPL-MCNC: 77 MG/DL (ref 70–99)
HCT VFR BLD AUTO: 37.2 % (ref 40.5–52.5)
HGB BLD-MCNC: 12.1 G/DL (ref 13.5–17.5)
MCH RBC QN AUTO: 31.9 PG (ref 26–34)
MCHC RBC AUTO-ENTMCNC: 32.5 G/DL (ref 31–36)
MCV RBC AUTO: 97.9 FL (ref 80–100)
PERFORMED ON: ABNORMAL
PERFORMED ON: NORMAL
PLATELET # BLD AUTO: 173 K/UL (ref 135–450)
PMV BLD AUTO: 9.5 FL (ref 5–10.5)
POTASSIUM SERPL-SCNC: 3.2 MMOL/L (ref 3.5–5.1)
PROT SERPL-MCNC: 6.4 G/DL (ref 6.4–8.2)
RBC # BLD AUTO: 3.8 M/UL (ref 4.2–5.9)
SODIUM SERPL-SCNC: 140 MMOL/L (ref 136–145)
WBC # BLD AUTO: 8.5 K/UL (ref 4–11)

## 2023-04-11 PROCEDURE — 97116 GAIT TRAINING THERAPY: CPT

## 2023-04-11 PROCEDURE — 97166 OT EVAL MOD COMPLEX 45 MIN: CPT

## 2023-04-11 PROCEDURE — 97535 SELF CARE MNGMENT TRAINING: CPT

## 2023-04-11 PROCEDURE — 85027 COMPLETE CBC AUTOMATED: CPT

## 2023-04-11 PROCEDURE — 2580000003 HC RX 258: Performed by: NURSE PRACTITIONER

## 2023-04-11 PROCEDURE — 6370000000 HC RX 637 (ALT 250 FOR IP): Performed by: HOSPITALIST

## 2023-04-11 PROCEDURE — 6370000000 HC RX 637 (ALT 250 FOR IP): Performed by: NURSE PRACTITIONER

## 2023-04-11 PROCEDURE — 97530 THERAPEUTIC ACTIVITIES: CPT

## 2023-04-11 PROCEDURE — 36415 COLL VENOUS BLD VENIPUNCTURE: CPT

## 2023-04-11 PROCEDURE — 6360000002 HC RX W HCPCS: Performed by: NURSE PRACTITIONER

## 2023-04-11 PROCEDURE — 99024 POSTOP FOLLOW-UP VISIT: CPT | Performed by: NURSE PRACTITIONER

## 2023-04-11 PROCEDURE — 1200000000 HC SEMI PRIVATE

## 2023-04-11 PROCEDURE — 6360000002 HC RX W HCPCS: Performed by: HOSPITALIST

## 2023-04-11 PROCEDURE — 97162 PT EVAL MOD COMPLEX 30 MIN: CPT

## 2023-04-11 PROCEDURE — APPNB30 APP NON BILLABLE TIME 0-30 MINS: Performed by: NURSE PRACTITIONER

## 2023-04-11 PROCEDURE — 80053 COMPREHEN METABOLIC PANEL: CPT

## 2023-04-11 PROCEDURE — 94640 AIRWAY INHALATION TREATMENT: CPT

## 2023-04-11 RX ORDER — IPRATROPIUM BROMIDE AND ALBUTEROL SULFATE 2.5; .5 MG/3ML; MG/3ML
1 SOLUTION RESPIRATORY (INHALATION) EVERY 4 HOURS PRN
Status: DISCONTINUED | OUTPATIENT
Start: 2023-04-11 | End: 2023-04-13 | Stop reason: HOSPADM

## 2023-04-11 RX ORDER — PREGABALIN 150 MG/1
150 CAPSULE ORAL 2 TIMES DAILY
COMMUNITY
Start: 2023-04-07

## 2023-04-11 RX ORDER — HYDROXYCHLOROQUINE SULFATE 200 MG/1
200 TABLET, FILM COATED ORAL 2 TIMES DAILY
Status: DISCONTINUED | OUTPATIENT
Start: 2023-04-11 | End: 2023-04-13 | Stop reason: HOSPADM

## 2023-04-11 RX ORDER — PREGABALIN 150 MG/1
150 CAPSULE ORAL 2 TIMES DAILY
Status: DISCONTINUED | OUTPATIENT
Start: 2023-04-11 | End: 2023-04-13 | Stop reason: HOSPADM

## 2023-04-11 RX ORDER — POTASSIUM CHLORIDE 20 MEQ/1
40 TABLET, EXTENDED RELEASE ORAL ONCE
Status: COMPLETED | OUTPATIENT
Start: 2023-04-11 | End: 2023-04-11

## 2023-04-11 RX ORDER — BUDESONIDE 0.5 MG/2ML
0.5 INHALANT ORAL 2 TIMES DAILY
Status: DISCONTINUED | OUTPATIENT
Start: 2023-04-11 | End: 2023-04-13 | Stop reason: HOSPADM

## 2023-04-11 RX ORDER — ARFORMOTEROL TARTRATE 15 UG/2ML
15 SOLUTION RESPIRATORY (INHALATION) 2 TIMES DAILY
Status: DISCONTINUED | OUTPATIENT
Start: 2023-04-11 | End: 2023-04-13 | Stop reason: HOSPADM

## 2023-04-11 RX ADMIN — VENLAFAXINE HYDROCHLORIDE 150 MG: 150 CAPSULE, EXTENDED RELEASE ORAL at 08:22

## 2023-04-11 RX ADMIN — ACETAMINOPHEN 650 MG: 325 TABLET ORAL at 22:14

## 2023-04-11 RX ADMIN — METHOCARBAMOL 750 MG: 750 TABLET ORAL at 23:22

## 2023-04-11 RX ADMIN — HYDROXYCHLOROQUINE SULFATE 200 MG: 200 TABLET ORAL at 20:29

## 2023-04-11 RX ADMIN — SODIUM CHLORIDE, PRESERVATIVE FREE 10 ML: 5 INJECTION INTRAVENOUS at 23:24

## 2023-04-11 RX ADMIN — CEFAZOLIN 2000 MG: 2 INJECTION, POWDER, FOR SOLUTION INTRAMUSCULAR; INTRAVENOUS at 08:51

## 2023-04-11 RX ADMIN — OXYCODONE HYDROCHLORIDE 10 MG: 5 TABLET ORAL at 07:20

## 2023-04-11 RX ADMIN — BUDESONIDE INHALATION 500 MCG: 0.5 SUSPENSION RESPIRATORY (INHALATION) at 21:24

## 2023-04-11 RX ADMIN — DIAZEPAM 5 MG: 5 TABLET ORAL at 20:28

## 2023-04-11 RX ADMIN — SENNOSIDES AND DOCUSATE SODIUM 1 TABLET: 50; 8.6 TABLET ORAL at 20:29

## 2023-04-11 RX ADMIN — POLYETHYLENE GLYCOL 3350 17 G: 17 POWDER, FOR SOLUTION ORAL at 08:14

## 2023-04-11 RX ADMIN — HYDROMORPHONE HYDROCHLORIDE 0.5 MG: 1 INJECTION, SOLUTION INTRAMUSCULAR; INTRAVENOUS; SUBCUTANEOUS at 01:35

## 2023-04-11 RX ADMIN — DIAZEPAM 5 MG: 5 TABLET ORAL at 13:46

## 2023-04-11 RX ADMIN — ACETAMINOPHEN 650 MG: 325 TABLET ORAL at 03:17

## 2023-04-11 RX ADMIN — OXYCODONE HYDROCHLORIDE 10 MG: 5 TABLET ORAL at 11:55

## 2023-04-11 RX ADMIN — HYDROMORPHONE HYDROCHLORIDE 0.5 MG: 1 INJECTION, SOLUTION INTRAMUSCULAR; INTRAVENOUS; SUBCUTANEOUS at 06:04

## 2023-04-11 RX ADMIN — VALSARTAN 320 MG: 320 TABLET, FILM COATED ORAL at 08:23

## 2023-04-11 RX ADMIN — ACETAMINOPHEN 650 MG: 325 TABLET ORAL at 16:34

## 2023-04-11 RX ADMIN — TAMSULOSIN HYDROCHLORIDE 0.4 MG: 0.4 CAPSULE ORAL at 08:22

## 2023-04-11 RX ADMIN — METHOCARBAMOL 750 MG: 750 TABLET ORAL at 16:34

## 2023-04-11 RX ADMIN — AMLODIPINE BESYLATE 10 MG: 10 TABLET ORAL at 08:15

## 2023-04-11 RX ADMIN — METHOCARBAMOL 1000 MG: 100 INJECTION INTRAMUSCULAR; INTRAVENOUS at 08:26

## 2023-04-11 RX ADMIN — ARFORMOTEROL TARTRATE 15 MCG: 15 SOLUTION RESPIRATORY (INHALATION) at 21:24

## 2023-04-11 RX ADMIN — POTASSIUM CHLORIDE 40 MEQ: 1500 TABLET, EXTENDED RELEASE ORAL at 11:54

## 2023-04-11 RX ADMIN — HYDROMORPHONE HYDROCHLORIDE 0.5 MG: 1 INJECTION, SOLUTION INTRAMUSCULAR; INTRAVENOUS; SUBCUTANEOUS at 14:09

## 2023-04-11 RX ADMIN — CEFAZOLIN 2000 MG: 2 INJECTION, POWDER, FOR SOLUTION INTRAMUSCULAR; INTRAVENOUS at 00:17

## 2023-04-11 RX ADMIN — OXYCODONE HYDROCHLORIDE 10 MG: 5 TABLET ORAL at 22:14

## 2023-04-11 RX ADMIN — PREGABALIN 150 MG: 150 CAPSULE ORAL at 20:29

## 2023-04-11 RX ADMIN — ENOXAPARIN SODIUM 40 MG: 100 INJECTION SUBCUTANEOUS at 08:13

## 2023-04-11 RX ADMIN — ACETAMINOPHEN 650 MG: 325 TABLET ORAL at 10:35

## 2023-04-11 RX ADMIN — PRAVASTATIN SODIUM 20 MG: 20 TABLET ORAL at 08:15

## 2023-04-11 RX ADMIN — HYDROXYCHLOROQUINE SULFATE 200 MG: 200 TABLET ORAL at 08:15

## 2023-04-11 RX ADMIN — OXYCODONE HYDROCHLORIDE 10 MG: 5 TABLET ORAL at 18:16

## 2023-04-11 RX ADMIN — SENNOSIDES AND DOCUSATE SODIUM 1 TABLET: 50; 8.6 TABLET ORAL at 08:22

## 2023-04-11 RX ADMIN — OXYCODONE HYDROCHLORIDE 10 MG: 5 TABLET ORAL at 03:17

## 2023-04-11 RX ADMIN — HYDROMORPHONE HYDROCHLORIDE 0.5 MG: 1 INJECTION, SOLUTION INTRAMUSCULAR; INTRAVENOUS; SUBCUTANEOUS at 23:22

## 2023-04-11 ASSESSMENT — PAIN - FUNCTIONAL ASSESSMENT
PAIN_FUNCTIONAL_ASSESSMENT: PREVENTS OR INTERFERES SOME ACTIVE ACTIVITIES AND ADLS
PAIN_FUNCTIONAL_ASSESSMENT: ACTIVITIES ARE NOT PREVENTED
PAIN_FUNCTIONAL_ASSESSMENT: ACTIVITIES ARE NOT PREVENTED
PAIN_FUNCTIONAL_ASSESSMENT: PREVENTS OR INTERFERES SOME ACTIVE ACTIVITIES AND ADLS
PAIN_FUNCTIONAL_ASSESSMENT: ACTIVITIES ARE NOT PREVENTED
PAIN_FUNCTIONAL_ASSESSMENT: PREVENTS OR INTERFERES SOME ACTIVE ACTIVITIES AND ADLS
PAIN_FUNCTIONAL_ASSESSMENT: ACTIVITIES ARE NOT PREVENTED
PAIN_FUNCTIONAL_ASSESSMENT: PREVENTS OR INTERFERES SOME ACTIVE ACTIVITIES AND ADLS

## 2023-04-11 ASSESSMENT — PAIN DESCRIPTION - ONSET
ONSET: ON-GOING
ONSET: PROGRESSIVE
ONSET: ON-GOING
ONSET: PROGRESSIVE

## 2023-04-11 ASSESSMENT — PAIN SCALES - GENERAL
PAINLEVEL_OUTOF10: 6
PAINLEVEL_OUTOF10: 8
PAINLEVEL_OUTOF10: 5
PAINLEVEL_OUTOF10: 7
PAINLEVEL_OUTOF10: 7
PAINLEVEL_OUTOF10: 9
PAINLEVEL_OUTOF10: 5
PAINLEVEL_OUTOF10: 7
PAINLEVEL_OUTOF10: 7
PAINLEVEL_OUTOF10: 8
PAINLEVEL_OUTOF10: 5
PAINLEVEL_OUTOF10: 8
PAINLEVEL_OUTOF10: 7
PAINLEVEL_OUTOF10: 6
PAINLEVEL_OUTOF10: 4
PAINLEVEL_OUTOF10: 5

## 2023-04-11 ASSESSMENT — PAIN DESCRIPTION - PAIN TYPE
TYPE: SURGICAL PAIN

## 2023-04-11 ASSESSMENT — PAIN DESCRIPTION - DESCRIPTORS
DESCRIPTORS: ACHING;DISCOMFORT
DESCRIPTORS: ACHING;SORE
DESCRIPTORS: ACHING;SORE
DESCRIPTORS: ACHING
DESCRIPTORS: ACHING;DISCOMFORT
DESCRIPTORS: ACHING;SORE
DESCRIPTORS: ACHING;SORE
DESCRIPTORS: ACHING

## 2023-04-11 ASSESSMENT — PAIN DESCRIPTION - ORIENTATION
ORIENTATION: LOWER;MID
ORIENTATION: MID;LOWER
ORIENTATION: MID
ORIENTATION: MID;LOWER
ORIENTATION: MID
ORIENTATION: MID;LOWER

## 2023-04-11 ASSESSMENT — PAIN DESCRIPTION - FREQUENCY
FREQUENCY: CONTINUOUS
FREQUENCY: INTERMITTENT
FREQUENCY: CONTINUOUS
FREQUENCY: INTERMITTENT
FREQUENCY: CONTINUOUS

## 2023-04-11 ASSESSMENT — PAIN DESCRIPTION - LOCATION
LOCATION: BACK

## 2023-04-11 NOTE — PLAN OF CARE
Problem: Pain  Goal: Verbalizes/displays adequate comfort level or baseline comfort level  4/11/2023 0710 by Sanjuana Briceno RN  Outcome: Progressing   Pt endorsing pain to the back currently rated at a 8/10. Pain increases with movement. Pain being managed with prn and scheduled pain medication per the [facility] with some relief. Pt using rest, repositioning and distraction as non-pharm measures to decrease pain and promote comfort. Problem: Safety - Adult  Goal: Free from fall injury  4/11/2023 0710 by Sanjuana Briceno RN  Outcome: Progressing   All fall precautions in place. Bed locked and in lowest position with alarm on. Overbed table and personal belonings within reach. Call light within reach and patient instructed to use call light for assistance. Non-skid socks on.

## 2023-04-11 NOTE — PLAN OF CARE
Problem: Chronic Conditions and Co-morbidities  Goal: Patient's chronic conditions and co-morbidity symptoms are monitored and maintained or improved  Outcome: Progressing  Flowsheets (Taken 4/10/2023 2352)  Care Plan - Patient's Chronic Conditions and Co-Morbidity Symptoms are Monitored and Maintained or Improved: Monitor and assess patient's chronic conditions and comorbid symptoms for stability, deterioration, or improvement     Problem: Discharge Planning  Goal: Discharge to home or other facility with appropriate resources  Outcome: Progressing  Flowsheets (Taken 4/10/2023 2352)  Discharge to home or other facility with appropriate resources:   Identify barriers to discharge with patient and caregiver   Arrange for needed discharge resources and transportation as appropriate     Problem: Pain  Goal: Verbalizes/displays adequate comfort level or baseline comfort level  4/10/2023 2352 by Talon Barry RN  Outcome: Progressing  Flowsheets (Taken 4/10/2023 2352)  Verbalizes/displays adequate comfort level or baseline comfort level:   Encourage patient to monitor pain and request assistance   Assess pain using appropriate pain scale   Administer analgesics based on type and severity of pain and evaluate response   Implement non-pharmacological measures as appropriate and evaluate response  4/10/2023 1639 by Zak Casas RN  Outcome: Progressing  Note: Numeric pain scale being used. PRN IV dilaudid and PO valium given. Ice pack and lidocaine patch applied. Will continue to monitor and assess. Problem: Safety - Adult  Goal: Free from fall injury  4/10/2023 2352 by Talon Barry RN  Outcome: Progressing  Flowsheets (Taken 4/10/2023 2352)  Free From Fall Injury: Instruct family/caregiver on patient safety  4/10/2023 1639 by Zak Casas RN  Outcome: Progressing  Note: Pt resting in bed. Call light and belongings within reach. All fall precautions in place. Will continue to monitor.       Problem:

## 2023-04-12 ENCOUNTER — APPOINTMENT (OUTPATIENT)
Dept: CT IMAGING | Age: 63
DRG: 455 | End: 2023-04-12
Attending: NEUROLOGICAL SURGERY
Payer: COMMERCIAL

## 2023-04-12 LAB
GLUCOSE BLD-MCNC: 77 MG/DL (ref 70–99)
GLUCOSE BLD-MCNC: 86 MG/DL (ref 70–99)
GLUCOSE BLD-MCNC: 91 MG/DL (ref 70–99)
GLUCOSE BLD-MCNC: 91 MG/DL (ref 70–99)
PERFORMED ON: NORMAL

## 2023-04-12 PROCEDURE — 72131 CT LUMBAR SPINE W/O DYE: CPT

## 2023-04-12 PROCEDURE — 97535 SELF CARE MNGMENT TRAINING: CPT

## 2023-04-12 PROCEDURE — 6370000000 HC RX 637 (ALT 250 FOR IP): Performed by: NURSE PRACTITIONER

## 2023-04-12 PROCEDURE — 1200000000 HC SEMI PRIVATE

## 2023-04-12 PROCEDURE — 97530 THERAPEUTIC ACTIVITIES: CPT

## 2023-04-12 PROCEDURE — APPNB30 APP NON BILLABLE TIME 0-30 MINS: Performed by: NURSE PRACTITIONER

## 2023-04-12 PROCEDURE — 6360000002 HC RX W HCPCS: Performed by: HOSPITALIST

## 2023-04-12 PROCEDURE — 6360000002 HC RX W HCPCS: Performed by: NURSE PRACTITIONER

## 2023-04-12 PROCEDURE — 97116 GAIT TRAINING THERAPY: CPT

## 2023-04-12 PROCEDURE — 99024 POSTOP FOLLOW-UP VISIT: CPT | Performed by: NURSE PRACTITIONER

## 2023-04-12 PROCEDURE — 94640 AIRWAY INHALATION TREATMENT: CPT

## 2023-04-12 RX ORDER — DEXAMETHASONE SODIUM PHOSPHATE 4 MG/ML
4 INJECTION, SOLUTION INTRA-ARTICULAR; INTRALESIONAL; INTRAMUSCULAR; INTRAVENOUS; SOFT TISSUE EVERY 6 HOURS
Status: DISCONTINUED | OUTPATIENT
Start: 2023-04-12 | End: 2023-04-13

## 2023-04-12 RX ORDER — PANTOPRAZOLE SODIUM 40 MG/1
40 TABLET, DELAYED RELEASE ORAL
Status: DISCONTINUED | OUTPATIENT
Start: 2023-04-12 | End: 2023-04-13 | Stop reason: HOSPADM

## 2023-04-12 RX ADMIN — HYDROXYCHLOROQUINE SULFATE 200 MG: 200 TABLET ORAL at 08:55

## 2023-04-12 RX ADMIN — OXYCODONE HYDROCHLORIDE 10 MG: 5 TABLET ORAL at 15:24

## 2023-04-12 RX ADMIN — METHOCARBAMOL 750 MG: 750 TABLET ORAL at 06:45

## 2023-04-12 RX ADMIN — OXYCODONE HYDROCHLORIDE 10 MG: 5 TABLET ORAL at 06:45

## 2023-04-12 RX ADMIN — DEXAMETHASONE SODIUM PHOSPHATE 4 MG: 4 INJECTION, SOLUTION INTRAMUSCULAR; INTRAVENOUS at 18:15

## 2023-04-12 RX ADMIN — SENNOSIDES AND DOCUSATE SODIUM 1 TABLET: 50; 8.6 TABLET ORAL at 20:39

## 2023-04-12 RX ADMIN — ARFORMOTEROL TARTRATE 15 MCG: 15 SOLUTION RESPIRATORY (INHALATION) at 09:22

## 2023-04-12 RX ADMIN — DIAZEPAM 5 MG: 5 TABLET ORAL at 02:38

## 2023-04-12 RX ADMIN — ACETAMINOPHEN 650 MG: 325 TABLET ORAL at 10:49

## 2023-04-12 RX ADMIN — OXYCODONE HYDROCHLORIDE 10 MG: 5 TABLET ORAL at 20:39

## 2023-04-12 RX ADMIN — PREGABALIN 150 MG: 150 CAPSULE ORAL at 20:39

## 2023-04-12 RX ADMIN — OXYCODONE HYDROCHLORIDE 10 MG: 5 TABLET ORAL at 10:49

## 2023-04-12 RX ADMIN — ACETAMINOPHEN 650 MG: 325 TABLET ORAL at 15:24

## 2023-04-12 RX ADMIN — ENOXAPARIN SODIUM 40 MG: 100 INJECTION SUBCUTANEOUS at 08:56

## 2023-04-12 RX ADMIN — POLYETHYLENE GLYCOL 3350 17 G: 17 POWDER, FOR SOLUTION ORAL at 08:56

## 2023-04-12 RX ADMIN — METHOCARBAMOL 750 MG: 750 TABLET ORAL at 12:02

## 2023-04-12 RX ADMIN — DIAZEPAM 5 MG: 5 TABLET ORAL at 16:40

## 2023-04-12 RX ADMIN — DIAZEPAM 5 MG: 5 TABLET ORAL at 08:55

## 2023-04-12 RX ADMIN — METHOCARBAMOL 750 MG: 750 TABLET ORAL at 18:15

## 2023-04-12 RX ADMIN — PANTOPRAZOLE SODIUM 40 MG: 40 TABLET, DELAYED RELEASE ORAL at 18:15

## 2023-04-12 RX ADMIN — VALSARTAN 320 MG: 320 TABLET, FILM COATED ORAL at 08:55

## 2023-04-12 RX ADMIN — ACETAMINOPHEN 650 MG: 325 TABLET ORAL at 03:56

## 2023-04-12 RX ADMIN — PREGABALIN 150 MG: 150 CAPSULE ORAL at 08:55

## 2023-04-12 RX ADMIN — TAMSULOSIN HYDROCHLORIDE 0.4 MG: 0.4 CAPSULE ORAL at 08:55

## 2023-04-12 RX ADMIN — AMLODIPINE BESYLATE 10 MG: 10 TABLET ORAL at 08:55

## 2023-04-12 RX ADMIN — BUDESONIDE INHALATION 500 MCG: 0.5 SUSPENSION RESPIRATORY (INHALATION) at 09:22

## 2023-04-12 RX ADMIN — ACETAMINOPHEN 650 MG: 325 TABLET ORAL at 20:39

## 2023-04-12 RX ADMIN — PRAVASTATIN SODIUM 20 MG: 20 TABLET ORAL at 08:55

## 2023-04-12 RX ADMIN — HYDROXYCHLOROQUINE SULFATE 200 MG: 200 TABLET ORAL at 20:39

## 2023-04-12 RX ADMIN — OXYCODONE HYDROCHLORIDE 10 MG: 5 TABLET ORAL at 02:37

## 2023-04-12 RX ADMIN — VENLAFAXINE HYDROCHLORIDE 150 MG: 150 CAPSULE, EXTENDED RELEASE ORAL at 08:55

## 2023-04-12 RX ADMIN — SENNOSIDES AND DOCUSATE SODIUM 1 TABLET: 50; 8.6 TABLET ORAL at 08:55

## 2023-04-12 ASSESSMENT — PAIN SCALES - GENERAL
PAINLEVEL_OUTOF10: 0
PAINLEVEL_OUTOF10: 9
PAINLEVEL_OUTOF10: 5
PAINLEVEL_OUTOF10: 7
PAINLEVEL_OUTOF10: 7
PAINLEVEL_OUTOF10: 5
PAINLEVEL_OUTOF10: 5
PAINLEVEL_OUTOF10: 7
PAINLEVEL_OUTOF10: 10
PAINLEVEL_OUTOF10: 8

## 2023-04-12 ASSESSMENT — PAIN DESCRIPTION - FREQUENCY
FREQUENCY: CONTINUOUS

## 2023-04-12 ASSESSMENT — PAIN DESCRIPTION - DESCRIPTORS
DESCRIPTORS: ACHING;SORE
DESCRIPTORS: ACHING
DESCRIPTORS: ACHING
DESCRIPTORS: ACHING;SORE
DESCRIPTORS: ACHING

## 2023-04-12 ASSESSMENT — PAIN DESCRIPTION - ONSET
ONSET: ON-GOING

## 2023-04-12 ASSESSMENT — PAIN DESCRIPTION - ORIENTATION
ORIENTATION: MID
ORIENTATION: LOWER;MID
ORIENTATION: MID
ORIENTATION: LOWER
ORIENTATION: LOWER;MID

## 2023-04-12 ASSESSMENT — PAIN DESCRIPTION - PAIN TYPE
TYPE: SURGICAL PAIN

## 2023-04-12 ASSESSMENT — PAIN SCALES - WONG BAKER: WONGBAKER_NUMERICALRESPONSE: 0

## 2023-04-12 ASSESSMENT — PAIN DESCRIPTION - LOCATION
LOCATION: BACK

## 2023-04-12 ASSESSMENT — PAIN - FUNCTIONAL ASSESSMENT
PAIN_FUNCTIONAL_ASSESSMENT: ACTIVITIES ARE NOT PREVENTED
PAIN_FUNCTIONAL_ASSESSMENT: PREVENTS OR INTERFERES SOME ACTIVE ACTIVITIES AND ADLS
PAIN_FUNCTIONAL_ASSESSMENT: ACTIVITIES ARE NOT PREVENTED
PAIN_FUNCTIONAL_ASSESSMENT: ACTIVITIES ARE NOT PREVENTED
PAIN_FUNCTIONAL_ASSESSMENT: PREVENTS OR INTERFERES SOME ACTIVE ACTIVITIES AND ADLS

## 2023-04-12 NOTE — CARE COORDINATION
Case Management Assessment  Initial Evaluation    Date/Time of Evaluation: 4/12/2023 4:05 PM  Assessment Completed by: LENCHO Barrientos    If patient is discharged prior to next notation, then this note serves as note for discharge by case management. Patient Name: Brandt Donahue                   YOB: 1960  Diagnosis: Spinal stenosis of lumbar region, unspecified whether neurogenic claudication present [M48.061]  Lumbar stenosis with neurogenic claudication [M48.062]                   Date / Time: 4/10/2023  5:22 AM    Patient Admission Status: Inpatient   Readmission Risk (Low < 19, Mod (19-27), High > 27): Readmission Risk Score: 7.9    Current PCP: Denise Gao MD  PCP verified by CM? Yes    Chart Reviewed: Yes      History Provided by: Patient  Patient Orientation: Alert and Oriented    Patient Cognition: Alert    Hospitalization in the last 30 days (Readmission):  No    If yes, Readmission Assessment in  Navigator will be completed. Advance Directives:      Code Status: Full Code   Patient's Primary Decision Maker is: Legal Next of Kin      Discharge Planning:    Patient lives with: Spouse/Significant Other Type of Home: House  Primary Care Giver: Self  Patient Support Systems include: Spouse/Significant Other   Current Financial resources: Medicare, Other (Comment) (commercial insurance)  Current community resources: None  Current services prior to admission: Durable Medical Equipment            Current DME: Valeriano Fletcher (Comment) (tub transfer bench)            Type of Home Care services:  OT, PT    ADLS  Prior functional level: Independent in ADLs/IADLs  Current functional level: Assistance with the following:, Mobility, Housework, Shopping    PT AM-PAC: 18 /24  OT AM-PAC: 19 /24    Family can provide assistance at DC: Yes  Would you like Case Management to discuss the discharge plan with any other family members/significant others, and if so, who?  Yes (Wife)  Plans to Return

## 2023-04-12 NOTE — PLAN OF CARE
Problem: Discharge Planning  Goal: Discharge to home or other facility with appropriate resources  Outcome: Progressing  Pt. Plans to d/c home with wife after pain is controled and hemovac is removed     Problem: Pain  Goal: Verbalizes/displays adequate comfort level or baseline comfort level  Outcome: Progressing  Pt. Pain being managed per PRN and scheduled meds. Pt. Endorses some relief with interventions and repositioning. Problem: Safety - Adult  Goal: Free from fall injury  Outcome: Progressing  Pt. Free of fall and injuries. All fall precautions in place. Bed locked and in lowest position with alarm on. Call light in reach.

## 2023-04-12 NOTE — CONSULTS
Clinical Pharmacy Progress Note    Admit date: 4/10/2023     Subjective/Objective:  Pt admitted s/p L2-L4 lumbar interbody fusion and L2-pelvis posterior fixation  Pharmacy has been consulted to make pain medication recommendations by EUSEBIO Dawson    Pain scores have been 7-8/10 at their worst.     4/11: Spoke with patient and wife at bedside today. Pt sitting up in chair and reports improved symptoms since ~1pm today when wife arrived. Reviewed medications that were given since then -- appears that IV hydromorphone and PO diazepam provided improvement in pain this afternoon. Pt reports that he takes Pregabalin 150mg BID at home. 4/12: Pain scores 7/10 this AM. Home Pregabalin resumed 4/11. Has not required IV Hydromorphone since ~2300 on 4/11. Current pain regimen:   Medication  Home med? Amount used yesterday    Acetaminophen 650mg PO q6h Yes 4 doses   Diazepam 5mg PO q6h PRN anxiety/spasms No 4 doses   Hydromorphone 0.25-0.5mg IV q3h PRN No 1 mg (0.5 mg x 2 doses)   Lidocaine 4% patch daily   1 patch   Methocarbamol  750mg PO q6h  4 doses   Oxycodone IR 5-10mg PO q4h PRN No 50mg (5 doses x 10mg)   Pregabalin 150mg PO BID Yes  2 doses            ASSESSMENT/PLAN:  1)  Pain Management:   Agree with scheduling Acetaminophen to provide multi-modal analgesia and resuming home Pregabalin. Pt has not required IV hydromorphone since ~2300 last night. Pharmacy will off consult at this time -- if further assistance wanted, please re-consult pharmacy. Will notify EUSEBIO Dawson of above recommendations. Will continue to monitor and assist with adjustments to regimen as needed. Please call with any questions.   David Caceres PharmD, [facility]  Wireless: [id number]   4/12/2023 12:18 PM
Clinical Pharmacy Progress Note    Admit date: 4/10/2023     Subjective/Objective:  Pt admitted s/p L2-L4 lumbar interbody fusion and L2-pelvis posterior fixation  Pharmacy has been consulted to make pain medication recommendations by EUSEBIO Patterson    Pain scores have been 7-8/10 at their worst.     4/11: Spoke with patient and wife at bedside today. Pt sitting up in chair and reports improved symptoms since ~1pm today when wife arrived. Reviewed medications that were given since then -- appears that IV hydromorphone and PO diazepam provided improvement in pain this afternoon. Pt reports that he takes Pregabalin 150mg BID at home. Current pain regimen:   Medication  Home med? Amount used yesterday    Acetaminophen 650mg PO q6h Yes 4 doses   Diazepam 5mg PO q6h PRN anxiety/spasms No 3 doses   Hydromorphone 0.25-0.5mg IV q3h PRN No 3 mg IV (6 doses)   Lidocaine 4% patch daily      Methocarbamol 1000mg IV q8h x3 doses, now 750mg PO q6h     Oxycodone IR 5-10mg PO q4h PRN  50mg (5 doses)               Morphine Equivalent Daily Dose (MEDD):   Date MEDD (mg)             ASSESSMENT/PLAN:  1)  Pain Management:   Agree with scheduling Acetaminophen to provide multi-modal analgesia  Pt reports that diazepam greatly helped pain this afternoon - would recommend to continue. Also reports that oxycodone is working for pain control. Would recommend restarting home Pregabalin 150mg BID. Will notify EUSEBIO Patterson of above recommendations. Will continue to monitor and assist with adjustments to regimen as needed. Please call with any questions.   Mallorie Luz, PharmD, [facility]  Wireless: [id number]   4/11/2023 3:05 PM
ARTHROPLASTY WOUND,  POLY EXCHANGE performed by Lisa Dougherty MD at [address]        Medications     Prior to Admission medications    Medication Sig Start Date End Date Taking? Authorizing Provider   empagliflozin (JARDIANCE) 10 MG tablet Take 1 tablet by mouth daily 2/17/23  Yes Historical Provider, MD   folic acid (FOLVITE) 1 MG tablet Take 2 tablets a day. 12/21/22  Yes Historical Provider, MD   hydroCHLOROthiazide (HYDRODIURIL) 25 MG tablet Take 1 tablet by mouth daily 8/3/21  Yes Historical Provider, MD   Adalimumab (HUMIRA PEN) 40 MG/0.4ML PNKT Inject one pen subcutaneously every 2 weeks  Indications: Rheumatic Disease causing Vertebrae Inflammation 10/13/22  Yes Historical Provider, MD   hydroxychloroquine (PLAQUENIL) 200 MG tablet Take 1 tablet daily for 1 week and if tolerated uptitrate to twice a day 12/21/22  Yes Historical Provider, MD   methotrexate (RHEUMATREX) 2.5 MG chemo tablet Take 8 tablets once a week.  Split dose into 4 pills in the a.m. and 4 pills in the p.m. 4/3/23  Yes Historical Provider, MD   valsartan (DIOVAN) 320 MG tablet Take 1 tablet by mouth daily 11/20/21  Yes Historical Provider, MD   tadalafil (CIALIS) 20 MG tablet SMARTSIG:Tablet(s) By Mouth 10/9/21  Yes Historical Provider, MD   Albuterol Sulfate, sensor, (PROAIR DIGIHALER) 108 (90 Base) MCG/ACT AEPB Inhale 2 puffs into the lungs    Historical Provider, MD   amLODIPine (NORVASC) 10 MG tablet Take 1 tablet by mouth daily    Historical Provider, MD   mometasone-formoterol (DULERA) 200-5 MCG/ACT inhaler Inhale 2 puffs into the lungs as needed    Historical Provider, MD   tamsulosin (FLOMAX) 0.4 MG capsule Take 1 capsule by mouth daily    Historical Provider, MD   sildenafil (REVATIO) 20 MG tablet Take 1 tablet by mouth as needed    Historical Provider, MD   pravastatin (PRAVACHOL) 20 MG tablet Take 1 tablet by mouth daily    Historical Provider, MD   LINZESS 290 MCG CAPS capsule every morning (before breakfast)  5/23/17

## 2023-04-12 NOTE — PLAN OF CARE
Problem: Safety - Adult  Goal: Free from fall injury  4/12/2023 0740 by Gilma Weber RN  Outcome: Progressing   All fall precautions in place. Bed locked and in lowest position with alarm on. Overbed table and personal belonings within reach. Call light within reach and patient instructed to use call light for assistance. Non-skid socks on. Problem: Pain  Goal: Verbalizes/displays adequate comfort level or baseline comfort level  4/12/2023 0740 by Gilma Weber RN  Outcome: Progressing   Pt endorsing pain to the back currently rated at a 7/10. Pain increases with movement. Pain being managed with prn and scheduled pain medication per the [facility] with some relief. Pt using rest, repositioning and distraction as non-pharm measures to decrease pain and promote comfort.

## 2023-04-13 VITALS
WEIGHT: 197.6 LBS | HEART RATE: 76 BPM | BODY MASS INDEX: 26.19 KG/M2 | RESPIRATION RATE: 16 BRPM | OXYGEN SATURATION: 97 % | DIASTOLIC BLOOD PRESSURE: 83 MMHG | HEIGHT: 73 IN | SYSTOLIC BLOOD PRESSURE: 148 MMHG | TEMPERATURE: 97.7 F

## 2023-04-13 PROBLEM — Z98.1 S/P LUMBAR FUSION: Status: ACTIVE | Noted: 2023-04-10

## 2023-04-13 LAB
GLUCOSE BLD-MCNC: 105 MG/DL (ref 70–99)
GLUCOSE BLD-MCNC: 114 MG/DL (ref 70–99)
PERFORMED ON: ABNORMAL
PERFORMED ON: ABNORMAL

## 2023-04-13 PROCEDURE — 6360000002 HC RX W HCPCS: Performed by: HOSPITALIST

## 2023-04-13 PROCEDURE — 2580000003 HC RX 258: Performed by: NURSE PRACTITIONER

## 2023-04-13 PROCEDURE — 97530 THERAPEUTIC ACTIVITIES: CPT

## 2023-04-13 PROCEDURE — 99239 HOSP IP/OBS DSCHRG MGMT >30: CPT | Performed by: NURSE PRACTITIONER

## 2023-04-13 PROCEDURE — 6370000000 HC RX 637 (ALT 250 FOR IP): Performed by: NURSE PRACTITIONER

## 2023-04-13 PROCEDURE — 97535 SELF CARE MNGMENT TRAINING: CPT

## 2023-04-13 PROCEDURE — 94640 AIRWAY INHALATION TREATMENT: CPT

## 2023-04-13 PROCEDURE — APPNB30 APP NON BILLABLE TIME 0-30 MINS: Performed by: NURSE PRACTITIONER

## 2023-04-13 PROCEDURE — 97116 GAIT TRAINING THERAPY: CPT

## 2023-04-13 PROCEDURE — 6360000002 HC RX W HCPCS: Performed by: NURSE PRACTITIONER

## 2023-04-13 PROCEDURE — 99024 POSTOP FOLLOW-UP VISIT: CPT | Performed by: NURSE PRACTITIONER

## 2023-04-13 RX ORDER — METHOCARBAMOL 750 MG/1
750 TABLET, FILM COATED ORAL 4 TIMES DAILY
Qty: 40 TABLET | Refills: 0 | Status: SHIPPED | OUTPATIENT
Start: 2023-04-13

## 2023-04-13 RX ORDER — OXYCODONE HYDROCHLORIDE 5 MG/1
5-10 TABLET ORAL EVERY 4 HOURS PRN
Qty: 60 TABLET | Refills: 0 | Status: SHIPPED | OUTPATIENT
Start: 2023-04-13 | End: 2023-04-20

## 2023-04-13 RX ORDER — DEXAMETHASONE 4 MG/1
4 TABLET ORAL EVERY 6 HOURS
Status: DISCONTINUED | OUTPATIENT
Start: 2023-04-13 | End: 2023-04-13 | Stop reason: HOSPADM

## 2023-04-13 RX ORDER — SENNA AND DOCUSATE SODIUM 50; 8.6 MG/1; MG/1
1 TABLET, FILM COATED ORAL 2 TIMES DAILY
COMMUNITY
Start: 2023-04-13

## 2023-04-13 RX ORDER — POLYETHYLENE GLYCOL 3350 17 G/17G
17 POWDER, FOR SOLUTION ORAL DAILY
Qty: 527 G | Refills: 1 | COMMUNITY
Start: 2023-04-14 | End: 2023-05-14

## 2023-04-13 RX ORDER — DIAZEPAM 5 MG/1
5 TABLET ORAL EVERY 12 HOURS PRN
Qty: 20 TABLET | Refills: 0 | Status: SHIPPED | OUTPATIENT
Start: 2023-04-13 | End: 2023-04-23

## 2023-04-13 RX ORDER — DEXAMETHASONE 4 MG/1
4 TABLET ORAL EVERY 6 HOURS
Qty: 4 TABLET | Refills: 0 | Status: SHIPPED | OUTPATIENT
Start: 2023-04-13 | End: 2023-04-14

## 2023-04-13 RX ADMIN — PANTOPRAZOLE SODIUM 40 MG: 40 TABLET, DELAYED RELEASE ORAL at 05:45

## 2023-04-13 RX ADMIN — OXYCODONE HYDROCHLORIDE 10 MG: 5 TABLET ORAL at 15:53

## 2023-04-13 RX ADMIN — VALSARTAN 320 MG: 320 TABLET, FILM COATED ORAL at 09:11

## 2023-04-13 RX ADMIN — SODIUM CHLORIDE, PRESERVATIVE FREE 10 ML: 5 INJECTION INTRAVENOUS at 08:54

## 2023-04-13 RX ADMIN — ACETAMINOPHEN 650 MG: 325 TABLET ORAL at 09:11

## 2023-04-13 RX ADMIN — SENNOSIDES AND DOCUSATE SODIUM 1 TABLET: 50; 8.6 TABLET ORAL at 08:50

## 2023-04-13 RX ADMIN — TAMSULOSIN HYDROCHLORIDE 0.4 MG: 0.4 CAPSULE ORAL at 08:50

## 2023-04-13 RX ADMIN — AMLODIPINE BESYLATE 10 MG: 10 TABLET ORAL at 08:50

## 2023-04-13 RX ADMIN — METHOCARBAMOL 750 MG: 750 TABLET ORAL at 05:45

## 2023-04-13 RX ADMIN — DEXAMETHASONE SODIUM PHOSPHATE 4 MG: 4 INJECTION, SOLUTION INTRAMUSCULAR; INTRAVENOUS at 05:45

## 2023-04-13 RX ADMIN — DEXAMETHASONE 4 MG: 4 TABLET ORAL at 15:53

## 2023-04-13 RX ADMIN — ACETAMINOPHEN 650 MG: 325 TABLET ORAL at 15:53

## 2023-04-13 RX ADMIN — OXYCODONE HYDROCHLORIDE 10 MG: 5 TABLET ORAL at 00:32

## 2023-04-13 RX ADMIN — ARFORMOTEROL TARTRATE 15 MCG: 15 SOLUTION RESPIRATORY (INHALATION) at 11:04

## 2023-04-13 RX ADMIN — OXYCODONE HYDROCHLORIDE 10 MG: 5 TABLET ORAL at 05:45

## 2023-04-13 RX ADMIN — HYDROXYCHLOROQUINE SULFATE 200 MG: 200 TABLET ORAL at 08:50

## 2023-04-13 RX ADMIN — ENOXAPARIN SODIUM 40 MG: 100 INJECTION SUBCUTANEOUS at 08:51

## 2023-04-13 RX ADMIN — METHOCARBAMOL 750 MG: 750 TABLET ORAL at 12:06

## 2023-04-13 RX ADMIN — PRAVASTATIN SODIUM 20 MG: 20 TABLET ORAL at 08:50

## 2023-04-13 RX ADMIN — POLYETHYLENE GLYCOL 3350 17 G: 17 POWDER, FOR SOLUTION ORAL at 08:50

## 2023-04-13 RX ADMIN — DEXAMETHASONE SODIUM PHOSPHATE 4 MG: 4 INJECTION, SOLUTION INTRAMUSCULAR; INTRAVENOUS at 00:31

## 2023-04-13 RX ADMIN — METHOCARBAMOL 750 MG: 750 TABLET ORAL at 00:32

## 2023-04-13 RX ADMIN — BUDESONIDE INHALATION 500 MCG: 0.5 SUSPENSION RESPIRATORY (INHALATION) at 11:04

## 2023-04-13 RX ADMIN — DIAZEPAM 5 MG: 5 TABLET ORAL at 15:53

## 2023-04-13 RX ADMIN — PREGABALIN 150 MG: 150 CAPSULE ORAL at 08:50

## 2023-04-13 RX ADMIN — ACETAMINOPHEN 650 MG: 325 TABLET ORAL at 05:49

## 2023-04-13 RX ADMIN — VENLAFAXINE HYDROCHLORIDE 150 MG: 150 CAPSULE, EXTENDED RELEASE ORAL at 08:50

## 2023-04-13 ASSESSMENT — PAIN DESCRIPTION - PAIN TYPE: TYPE: SURGICAL PAIN

## 2023-04-13 ASSESSMENT — PAIN DESCRIPTION - FREQUENCY: FREQUENCY: CONTINUOUS

## 2023-04-13 ASSESSMENT — PAIN - FUNCTIONAL ASSESSMENT: PAIN_FUNCTIONAL_ASSESSMENT: ACTIVITIES ARE NOT PREVENTED

## 2023-04-13 ASSESSMENT — PAIN DESCRIPTION - LOCATION: LOCATION: BACK

## 2023-04-13 ASSESSMENT — PAIN SCALES - GENERAL
PAINLEVEL_OUTOF10: 0
PAINLEVEL_OUTOF10: 4

## 2023-04-13 ASSESSMENT — PAIN DESCRIPTION - ORIENTATION: ORIENTATION: LOWER

## 2023-04-13 ASSESSMENT — PAIN DESCRIPTION - ONSET: ONSET: ON-GOING

## 2023-04-13 ASSESSMENT — PAIN DESCRIPTION - DESCRIPTORS: DESCRIPTORS: ACHING

## 2023-04-13 NOTE — CARE COORDINATION
[phone]. They have accepted this patient and will [facility].  They will contact patient and make arrangements for [facility] by 4/15  Electronically signed by Bethany Marcano LPN on 1/35/9047 at 6:24 PM

## 2023-04-13 NOTE — DISCHARGE SUMMARY
hydroCHLOROthiazide 25 MG tablet  Commonly known as: HYDRODIURIL     hydroxychloroquine 200 MG tablet  Commonly known as: PLAQUENIL     Linzess 290 MCG Caps capsule  Generic drug: linaclotide     mometasone-formoterol 200-5 MCG/ACT inhaler  Commonly known as: DULERA     pravastatin 20 MG tablet  Commonly known as: PRAVACHOL     pregabalin 150 MG capsule  Commonly known as: LYRICA     ProAir Digihaler 108 (90 Base) MCG/ACT Aepb  Generic drug: Albuterol Sulfate (sensor)     sildenafil 20 MG tablet  Commonly known as: REVATIO     tadalafil 20 MG tablet  Commonly known as: CIALIS     tamsulosin 0.4 MG capsule  Commonly known as: FLOMAX     Tylenol 8 Hour Arthritis Pain 650 MG extended release tablet  Generic drug: acetaminophen     valsartan 320 MG tablet  Commonly known as: DIOVAN     venlafaxine 150 MG extended release capsule  Commonly known as: EFFEXOR XR            STOP taking these medications      methotrexate 2.5 MG chemo tablet  Commonly known as: RHEUMATREX               Where to Get Your Medications        These medications were sent to [facility], [facility], [address] [place] [address] Road      Phone: [phone]   dexamethasone 4 MG tablet  diazePAM 5 MG tablet  methocarbamol 750 MG tablet  oxyCODONE 5 MG immediate release tablet       You can get these medications from any pharmacy    You don't need a prescription for these medications  polyethylene glycol 17 g packet  sennosides-docusate sodium 8.6-50 MG tablet         Discharge Destination:  The patient was discharged to Home. Follow-up:  The patient is to follow-up with Darell Torres MD in the office in 2 weeks      Discharge Instructions:   Verbal and written discharge instructions were given to the patient at the time of discharge.     Electronically signed by: EUSEBIO Buitrago CNP, APRN-CNP, 4/13/2023 1:33 PM  [phone]    I spent 40 minutes in

## 2023-04-13 NOTE — CARE COORDINATION
UPDATE: [facility], and she will send referral for [facility] to [facility] today. CM will continue to follow for discharge planning. Electronically signed by LENCHO Balderas on 4/13/2023 at 12:53 PM  [phone]    CARLOS A following: CM met with pt at bedside. Discharge plan continues to be home with [facility] through [place]. Pt has DME at home and wife will provide transportation at discharge. CM will continue to follow for discharge planning.   Electronically signed by LENCHO Balderas on 4/13/2023 at 12:28 PM  [phone]

## 2023-04-13 NOTE — PROGRESS NOTES
Hospitalist Progress Note      PCP: Kj Julien MD    Date of Admission: 4/10/2023    Chief Complaint:     Hospital Course:     Subjective:     Back pain remains uncontrolled, has been able to ambulate with PT about this morning.      Medications:  Reviewed    Infusion Medications    sodium chloride 5 mL/hr at 04/10/23 1724    dextrose       Scheduled Medications    budesonide  0.5 mg Nebulization BID    And    arformoterol tartrate  15 mcg Nebulization BID    pregabalin  150 mg Oral BID    hydroxychloroquine  200 mg Oral BID    sodium chloride flush  5-40 mL IntraVENous 2 times per day    polyethylene glycol  17 g Oral Daily    sennosides-docusate sodium  1 tablet Oral BID    acetaminophen  650 mg Oral Q6H    lidocaine  1 patch Topical Q12H    enoxaparin  40 mg SubCUTAneous Daily    methocarbamol  750 mg Oral 4 times per day    [Held by provider] hydroCHLOROthiazide  25 mg Oral Daily    linaclotide  290 mcg Oral QAM AC    pravastatin  20 mg Oral Daily    tamsulosin  0.4 mg Oral Daily    valsartan  320 mg Oral Daily    venlafaxine  150 mg Oral Daily    amLODIPine  10 mg Oral Daily    insulin lispro  0-4 Units SubCUTAneous TID WC    insulin lispro  0-4 Units SubCUTAneous Nightly     PRN Meds: ipratropium-albuterol, sodium chloride flush, sodium chloride, bisacodyl, oxyCODONE **OR** oxyCODONE, HYDROmorphone **OR** HYDROmorphone, diazePAM, naloxone, ondansetron **OR** ondansetron, cetirizine-psuedoephedrine, glucose, dextrose bolus **OR** dextrose bolus, glucagon (rDNA), dextrose      Intake/Output Summary (Last 24 hours) at 4/12/2023 0839  Last data filed at 4/12/2023 0401  Gross per 24 hour   Intake 1020 ml   Output 965 ml   Net 55 ml         Physical Exam Performed:    /84   Pulse 81   Temp 97.9 °F (36.6 °C) (Oral)   Resp 18   Ht 6' 1\" (1.854 m)   Wt 197 lb 9.6 oz (89.6 kg)   SpO2 98%   BMI 26.07 kg/m²     General appearance: No apparent distress, appears stated age and cooperative.  HEENT: 
04/11/23 1513   Encounter Summary   Encounter Overview/Reason  Initial Encounter   Service Provided For: Patient and family together   Referral/Consult From: [facility] Family members   Last Encounter  04/11/23  (queenie)   Complexity of Encounter Moderate   Begin Time 1420   End Time  1425   Total Time Calculated 5 min   Encounter    Type Initial Screen/Assessment   Assessment/Intervention/Outcome   Assessment Calm; Hopeful   Intervention Active listening;Explored/Affirmed feelings, thoughts, concerns   Outcome Comfort;Expressed feelings, needs, and concerns     Staff Caols Quiles MA, [facility]
1 PIV removed. Pt and wife educated on avs, pt and wife understood all avs education and did not have any questions. Pt wife packed up pt belongings.
4 Eyes Admission Assessment     I agree as the admission nurse that 2 RN's have performed a thorough Head to Toe Skin Assessment on the patient. ALL assessment sites listed below have been assessed on admission. Areas assessed by both nurses:   [x]   Head, Face, and Ears   [x]   Shoulders, Back, and Chest  [x]   Arms, Elbows, and Hands   [x]   Coccyx, Sacrum, and Ischium  [x]   Legs, Feet, and Heels        Does the Patient have Skin Breakdown?   No         William Prevention initiated:  NA   Wound Care Orders initiated:  NA      [facility] nurse consulted for Pressure Injury (Stage 3,4, Unstageable, DTI, NWPT, and Complex wounds) or William score 18 or lower:  NA      Nurse 1 eSignature: Electronically signed by Hudson Stuart RN on 4/10/23 at 4:44 PM EDT    **SHARE this note so that the co-signing nurse is able to place an eSignature**    Nurse 2 eSignature: Electronically signed by Elena Ramey RN on 4/10/23 at 7:42 PM EDT
Ancef 2 grams IVPB sent to OR with pt.
Clinical Pharmacy Progress Note  Medication History     Admit Date: 4/10/2023    Pharmacy consulted to verify home medication list by EUSEBIO Villagran. List of of current medications patient is taking is complete. Home Medication list in EPIC updated to reflect changes noted below. Source of information: interview with patient and wife;  Rx fill history    Changes made to medication list:   Medications added:   Pregabalin 150mg BID  Medication doses adjusted:   Folic FZTD-->8CD BID  [id number]-->[id number] BID  Methotrexate 2.5mg tablets -- takes 8 tablets/weeks on Fridays (he splits up to take 4 mg in AM and 4 mg in PM on Fridays)    Current Outpatient Medications   Medication Instructions    Adalimumab (HUMIRA PEN) 40 MG/0.4ML PNKT Inject one pen subcutaneously every 2 weeks  Indications: Rheumatic Disease causing Vertebrae Inflammation    Albuterol Sulfate, sensor, (PROAIR DIGIHALER) 108 (90 Base) MCG/ACT AEPB 2 puffs, Inhalation    amLODIPine (NORVASC) 10 mg, Oral, DAILY    empagliflozin (JARDIANCE) 10 mg, Oral, DAILY    folic acid (FOLVITE) 1 MG tablet Take 1 tablet by mouth in the morning and at bedtime    hydroCHLOROthiazide (HYDRODIURIL) 25 mg, Oral, DAILY    hydroxychloroquine (PLAQUENIL) 200 MG tablet Take 1 tablet by mouth 2 times daily    LINZESS 290 MCG CAPS capsule DAILY BEFORE BREAKFAST    Loratadine-Pseudoephedrine (CLARITIN-D 24 HOUR PO) Oral    methotrexate (RHEUMATREX) 2.5 MG chemo tablet On fridays    mometasone-formoterol (DULERA) 200-5 MCG/ACT inhaler 2 puffs, Inhalation, PRN    pravastatin (PRAVACHOL) 20 mg, Oral, DAILY    pregabalin (LYRICA) 150 mg, Oral, 2 times daily    sildenafil (REVATIO) 20 mg, Oral, PRN    tadalafil (CIALIS) 20 MG tablet SMARTSIG:Tablet(s) By Mouth    tamsulosin (FLOMAX) 0.4 mg, Oral, DAILY    Tylenol 8 Hour Arthritis Pain 650 mg, Oral, EVERY 8 HOURS PRN    valsartan (DIOVAN) 320 mg, Oral, DAILY    venlafaxine (EFFEXOR XR) 150 MG extended release capsule DAILY
Hospitalist Progress Note      PCP: Astrid Jackson MD    Date of Admission: 4/10/2023    Chief Complaint:     Hospital Course:     Subjective:     His back pain remains uncontrolled. Has not yet been able to ambulate with PT\"    Medications:  Reviewed    Infusion Medications    sodium chloride 100 mL/hr at 04/10/23 2237    sodium chloride 5 mL/hr at 04/10/23 1724    dextrose       Scheduled Medications    sodium chloride flush  5-40 mL IntraVENous 2 times per day    polyethylene glycol  17 g Oral Daily    sennosides-docusate sodium  1 tablet Oral BID    acetaminophen  650 mg Oral Q6H    lidocaine  1 patch Topical Q12H    enoxaparin  40 mg SubCUTAneous Daily    methocarbamol  750 mg Oral 4 times per day    [Held by provider] hydroCHLOROthiazide  25 mg Oral Daily    hydroxychloroquine  200 mg Oral Daily    linaclotide  290 mcg Oral QAM AC    pravastatin  20 mg Oral Daily    tamsulosin  0.4 mg Oral Daily    valsartan  320 mg Oral Daily    venlafaxine  150 mg Oral Daily    ceFAZolin  2,000 mg IntraVENous Q8H    amLODIPine  10 mg Oral Daily    insulin lispro  0-4 Units SubCUTAneous TID WC    insulin lispro  0-4 Units SubCUTAneous Nightly     PRN Meds: sodium chloride flush, sodium chloride, bisacodyl, oxyCODONE **OR** oxyCODONE, HYDROmorphone **OR** HYDROmorphone, diazePAM, naloxone, ondansetron **OR** ondansetron, cetirizine-psuedoephedrine, glucose, dextrose bolus **OR** dextrose bolus, glucagon (rDNA), dextrose      Intake/Output Summary (Last 24 hours) at 4/11/2023 0900  Last data filed at 4/11/2023 4177  Gross per 24 hour   Intake 2476 ml   Output 1920 ml   Net 556 ml       Physical Exam Performed:    /71   Pulse 73   Temp 98 °F (36.7 °C) (Oral)   Resp 18   Ht 6' 1\" (1.854 m)   Wt 197 lb 9.6 oz (89.6 kg)   SpO2 96%   BMI 26.07 kg/m²     General appearance: No apparent distress, appears stated age and cooperative. HEENT: Pupils equal, round, and reactive to light.  Conjunctivae/corneas
Incisional care provided to patient. Incision washed with soap and water and painted with CHG. Incision MICHAEL. Hemovac biopatch replaced x2 this shift. Moderate amount of bleeding around drain site. Site cleansed with soap and water. Plan of care continues.
Incisional care provided to patient. Incision washed with soap and water and painted with CHG. Scant amount of blood found on dressing. Incision MICHAEL. Plan of care continues.
NEUROSURGERY PROGRESS NOTE    4/11/2023 4:17 PM                               Serge Austin                      LOS: 1 day   POD# 1 s/p Procedure(s) (LRB):  L2-L4 EXTREME LATERAL INTERBODY FUSION WITH L2-PELVIS POSTERIOR FIXATION    Subjective: Patient sitting up in chair talking to Pharmacist upon entering the room. No acute events overnight. Patient c/o post-op pain. Physical Exam:  Patient seen and examined    Vitals:    04/11/23 1440   BP: (!) 141/82   Pulse: 77   Resp: 16   Temp: 97.6 °F (36.4 °C)   SpO2: 97%     GCS:  4 - Opens eyes on own  5 - Alert and oriented  6 - Follows simple motor commands  General: Well developed. Alert and cooperative in no acute distress. HENT: atraumatic, neck supple  Eyes: Optic discs: Not tested  Pulmonary: unlabored respiratory effort  Cardiovascular:  Warm well perfused. No peripheral edema  Gastrointestinal: abdomen soft, NT, ND    Neurological:  Mental Status: Awake, alert, oriented x 4, speech clear and appropriate  Attention: Intact  Language: No aphasia or dysarthria noted  Sensation: Intact to all extremities to light touch  Coordination: Intact    Musculoskeletal:   Gait: Not tested   Assist devices: None   Tone: Normal  Motor strength:    Right  Left    Right  Left    Deltoid  5 5  Hip Flex  5 5   Biceps  5 5  Knee Extensors  5 5   Triceps  5 5  Knee Flexors  5 5   Wrist Ext  5 5  Ankle Dorsiflex. 5 5   Wrist Flex  5 5  Ankle Plantarflex. 5 5   Handgrip  5 5  Ext Chris Longus  5 5   Thumb Ext  5 5         Incision:   Back Medial- CDI  Back Lateral- CDI    Drain: 170 mL in past 24 hours      Radiological Findings:  CT LUMBAR SPINE WO CONTRAST  Result Date: 4/10/2023  1. Post surgical changes of posterior fusion without evidence of immediate hardware malfunction. 2.  Degenerative facet disease results in multilevel neuroforaminal stenosis, most severe at T12-L1 and L2-L3.      Labs:  Recent Labs     04/11/23  0806   WBC 8.5   HGB 12.1*   HCT 37.2*   
NEUROSURGERY PROGRESS NOTE    4/12/2023 10:01 AM                               Artis Claudean Hutchinson                      LOS: 2 days   POD# 2 s/p Procedure(s) (LRB):  L2-L4 EXTREME LATERAL INTERBODY FUSION WITH L2-PELVIS POSTERIOR FIXATION    Subjective: Patient working with PT/OT upon entering the room. No acute events overnight. Patient c/o post-op pain. Physical Exam:  Patient seen and examined    Vitals:    04/12/23 0924   BP:    Pulse: 76   Resp: 16   Temp:    SpO2: 96%     GCS:  4 - Opens eyes on own  5 - Alert and oriented  6 - Follows simple motor commands  General: Well developed. Alert and cooperative in no acute distress. HENT: atraumatic, neck supple  Eyes: Optic discs: Not tested  Pulmonary: unlabored respiratory effort  Cardiovascular:  Warm well perfused. No peripheral edema  Gastrointestinal: abdomen soft, NT, ND    Neurological:  Mental Status: Awake, alert, oriented x 4, speech clear and appropriate  Attention: Intact  Language: No aphasia or dysarthria noted  Sensation: Intact to all extremities to light touch  Coordination: Intact    Musculoskeletal:   Gait: Not tested   Assist devices: None   Tone: Normal  Motor strength:    Right  Left    Right  Left    Deltoid  5 5  Hip Flex  5 5   Biceps  5 5  Knee Extensors  5 5   Triceps  5 5  Knee Flexors  5 5   Wrist Ext  5 5  Ankle Dorsiflex. 5 5   Wrist Flex  5 5  Ankle Plantarflex. 5 5   Handgrip  5 5  Ext Chris Longus  5 5   Thumb Ext  5 5         Incision:   Back Medial- CDI  Back Lateral- CDI    Drain: 135 mL in past 24 hours      Radiological Findings:  CT LUMBAR SPINE WO CONTRAST  Result Date: 4/10/2023  1. Post surgical changes of posterior fusion without evidence of immediate hardware malfunction. 2.  Degenerative facet disease results in multilevel neuroforaminal stenosis, most severe at T12-L1 and L2-L3.      Labs:  Recent Labs     04/11/23  0806   WBC 8.5   HGB 12.1*   HCT 37.2*            Recent Labs     04/11/23  0806   
NEUROSURGERY PROGRESS NOTE    4/13/2023 11:43 AM                               Serge Gonzalez                      LOS: 3 days   POD# 3 s/p Procedure(s) (LRB):  L2-L4 EXTREME LATERAL INTERBODY FUSION WITH L2-PELVIS POSTERIOR FIXATION    Subjective: Patient working with PT/OT upon entering the room. No acute events overnight. Patient c/o post-op pain. Physical Exam:  Patient seen and examined    Vitals:    04/13/23 1115   BP: (!) 148/83   Pulse: 76   Resp: 16   Temp: 97.7 °F (36.5 °C)   SpO2: 97%     GCS:  4 - Opens eyes on own  5 - Alert and oriented  6 - Follows simple motor commands  General: Well developed. Alert and cooperative in no acute distress. HENT: atraumatic, neck supple  Eyes: Optic discs: Not tested  Pulmonary: unlabored respiratory effort  Cardiovascular:  Warm well perfused. No peripheral edema  Gastrointestinal: abdomen soft, NT, ND    Neurological:  Mental Status: Awake, alert, oriented x 4, speech clear and appropriate  Attention: Intact  Language: No aphasia or dysarthria noted  Sensation: Intact to all extremities to light touch  Coordination: Intact    Musculoskeletal:   Gait: Not tested   Assist devices: None   Tone: Normal  Motor strength:    Right  Left    Right  Left    Deltoid  5 5  Hip Flex  5 5   Biceps  5 5  Knee Extensors  5 5   Triceps  5 5  Knee Flexors  5 5   Wrist Ext  5 5  Ankle Dorsiflex. 5 5   Wrist Flex  5 5  Ankle Plantarflex. 5 5   Handgrip  5 5  Ext Chris Longus  5 5   Thumb Ext  5 5         Incision:   Back Medial- CDI  Back Lateral- CDI    Drain: 15 mL in past 24 hours      Radiological Findings:  CT LUMBAR SPINE WO CONTRAST  Result Date: 4/10/2023  1. Post surgical changes of posterior fusion without evidence of immediate hardware malfunction. 2.  Degenerative facet disease results in multilevel neuroforaminal stenosis, most severe at T12-L1 and L2-L3. CT LUMBAR SPINE WO CONTRAST  Result Date: 4/12/2023  No acute fracture.  Stable degenerative changes
Occupational Therapy  Occupational Therapy  Daily Treatment Note  Patient Name: Carole Blas  MRN: [id number]    Assessment:   Pt continues to be limited by pain however highly motivated for therapy. Functional mobility and transfers completed with CGA. ADL washing and dressing tasks completed at sink with Min A for LE dressing with increased time and education on AE. Pt would benefit from 24hr assist upon return home. Continue per POC    Discharge Recommendations:   Equipment Needs:      Chart Reviewed: Yes       Other position/activity restrictions: ambulate, wear brace when up     Additional Pertinent Hx: LUMBAR 2 - LUMBAR 4 EXTREME LATERAL INTERBODY FUSION WITH LUMBAR 2 - PELVIS POSTERIOR FIXATION         Treatment Diagnosis: Decreased activity tolerance, impaired ADLs and mobility    Subjective:  Pt met supine in bed and agreeable to OT session. Pt with high pain reported with RN aware pain med given prior to session and pt repositioned to comfort at end of session.     Pain:     Social/Functional History  Lives With: Spouse  Type of Home: House  Home Layout: Two level, 1/2 bath on main level, Able to Live on Main level with bedroom/bathroom  Home Access: Stairs to enter with rails  Entrance Stairs - Number of Steps: 2 MINERVA  Bathroom Shower/Tub: Tub/Shower unit  Bathroom Toilet: Handicap height  Bathroom Equipment: Tub transfer bench (toilet safety frame upstairs, suction grab bars)  Home Equipment: Eulice Tevin, rolling  Has the patient had two or more falls in the past year or any fall with injury in the past year?: Yes (unknown number, caused by pain)  ADL Assistance: Independent  Homemaking Assistance: Independent  Ambulation Assistance: Independent (without AD)  Transfer Assistance: Independent  Active : Yes  Type of Occupation: renovations and maintenance  Leisure & Hobbies: yardwork  Prior Function  ADL Assistance: Independent  Homemaking Assistance: Independent  Ambulation Assistance: Independent
Occupational Therapy  Occupational Therapy  Daily Treatment Note  Patient Name: Seth Parent  MRN: [id number]    Chart Reviewed: Yes     Restrictions/Precautions: Fall Risk (high fall risk) Other position/activity restrictions: progressive ambulation, up with assistance, adult diet - regular, TLSO when up OOB/ambulating     Additional Pertinent Hx: LUMBAR 2 - LUMBAR 4 EXTREME LATERAL INTERBODY FUSION WITH LUMBAR 2 - PELVIS POSTERIOR FIXATION POD #2         Treatment Diagnosis: Decreased activity tolerance, impaired ADLs and mobility    Subjective: Pt supine in bed upon entry, motivated for therapy and d/c home. General Comments: Pt supine to sit Mod I. Dressed LB with figure 4 SBA in stance to pull up clothing and UB setup including shirt and brace. Pt ambulated ~10 ft to toilet Supervision. Pt toilet transfer SBA and toileted Supervision. Pt in stance at sink Supervision for oral care. Pt ambulated in hallway ~60 ft Supervision. Pt back in room stand to sit SBA. Call light in reach and chair alarm on. Pt given vcs for precautions during therapy session.     Pain: Denies    Social/Functional History  Lives With: Spouse  Type of Home: House  Home Layout: Two level, 1/2 bath on main level, Able to Live on Main level with bedroom/bathroom  Home Access: Stairs to enter with rails  Entrance Stairs - Number of Steps: 2 MINERVA  Bathroom Shower/Tub: Tub/Shower unit  Bathroom Toilet: Handicap height  Bathroom Equipment: Tub transfer bench (toilet safety frame upstairs, suction grab bars)  Home Equipment: katalina Cannon  Has the patient had two or more falls in the past year or any fall with injury in the past year?: Yes (unknown number, caused by pain)  ADL Assistance: Independent  Homemaking Assistance: Independent  Ambulation Assistance: Independent (without AD)  Transfer Assistance: Independent  Active : Yes  Type of Occupation: renovations and maintenance  Leisure & Hobbies: yardwork  Prior Function  ADL
Oral airway removed at 1244 without any difficulties.
PACU Transfer to Floor Note    Procedure(s):  LUMBAR 2 - LUMBAR 4 EXTREME LATERAL INTERBODY FUSION WITH LUMBAR 2 - PELVIS POSTERIOR FIXATION  . Current Allergies: Metformin, Shellfish-derived products, and Simvastatin    Pt meets criteria as per Saw Score and ASPAN Standards to transfer to next phase of care. Recent Labs     04/10/23  0916 04/10/23  1208   POCGLU 125* 192*       Vitals:    04/10/23 1430   BP: 118/77   Pulse: 83   Resp: 18   Temp: 97 °F (36.1 °C)   SpO2: 96%     Vitals within 20% of pt's admission vitals as per SAW SCORE    SpO2: 96 %    O2 Flow Rate (L/min): 2 L/min      Intake/Output Summary (Last 24 hours) at 4/10/2023 1433  Last data filed at 4/10/2023 1410  Gross per 24 hour   Intake 2046 ml   Output 800 ml   Net 1246 ml       Pain assessment:  present - adequately treated    Pain Level: 5      Handoff report given at bedside.    Family updated and directed to pt room      4/10/2023 2:33 PM
Patient admitted to PACU #9 from OR per bed at 1200 s/p LUMBAR 2 - LUMBAR 4 EXTREME LATERAL INTERBODY FUSION WITH LUMBAR 2 - PELVIS POSTERIOR FIXATION. Report received at bedside in PACU per CRNA and Nasir Perkins RN from [address] [place] [address]. Patient was reported to be hypotensive in OR which he received treatment for, see anesthesia record but no complications reported. Patient connected to PACU monitoring equipment. IVF's infusing with site unremarkable. Patient arrived to PACU with oral airway in place and unresponsive d/t not being wakeful from anesthesia but with respirations easy, even and regular with no pain noted or verbalized. Melendez catheter in place. Hemovac drain compressed. No further changes. Will continue to monitor.
Patient alert and oriented X 4. VSS. Tolerating fluids and diet. Taking medication www. Voiding via urinal.  Pain is managed per MAR. No c/o numbness or tingling. Skin CDI with exception of incision site. Drain in place. All fall precaution in place. Will continue to monitor.
Personally updated patient's wife in family waiting on patient's status and inpatient room number at 8910.
Physical Therapy  Facility/Department: [facility] 5T ORTHO/NEURO  Physical Therapy Treatment - 2nd Attempt    Name: Maddy Sanchez  : 1960  MRN: [id number]  Date of Service: 2023    Discharge Recommendations:  Maddy Sanchez scored a 18/24 on the AM-PAC short mobility form. Current research shows that an AM-PAC score of 18 or greater is typically associated with a discharge to the patient's home setting. Based on the patient's AM-PAC score and their current functional mobility deficits, it is recommended that the patient have 2-3 sessions per week of Physical Therapy at d/c to increase the patient's independence. At this time, this patient demonstrates the endurance and safety to discharge home with home PT and a follow up treatment frequency of 2-3x/wk. Please see assessment section for further patient specific details. HOME HEALTH CARE: LEVEL 1 STANDARD    - Initial home health evaluation to occur within 24-48 hours, in patient home   - Therapy to evaluate with goal of regaining prior level of functioning   - Therapy to evaluate if patient has [id number] needs for personal care      PT recommends initial 24 hour (A) upon d/c    If patient discharges prior to next session this note will serve as a discharge summary. Please see below for the latest assessment towards goals. PT Equipment Recommendations  Equipment Needed: No  Other: owns RW      Patient Diagnosis(es): There were no encounter diagnoses. Past Medical History:  has a past medical history of Acute renal failure ([address] Arthritis, Asthma, COPD (chronic obstructive pulmonary disease) ([address] Diabetes mellitus ([address] GERD (gastroesophageal reflux disease), History of gastritis, Hyperlipidemia, Hypertension, Pancreatitis, peripheral neuropathy, and spinal stenosis. Past Surgical History:  has a past surgical history that includes Rotator cuff repair (Bilateral); Cervical spine surgery; Carpal tunnel release (Left);  Nerve Surgery;
Physical Therapy  Facility/Department: [place] [address]  Physical Therapy Treatment    Name: Carole Blas  : 1960  MRN: [id number]  Date of Service: 2023    Discharge Recommendations:  Carole Blas scored a 18/24 on the AM-PAC short mobility form. Current research shows that an AM-PAC score of 18 or greater is typically associated with a discharge to the patient's home setting. Based on the patient's AM-PAC score and their current functional mobility deficits, it is recommended that the patient have 2-3 sessions per week of Physical Therapy at d/c to increase the patient's independence. At this time, this patient demonstrates the endurance and safety to discharge home with home PT and a follow up treatment frequency of 2-3x/wk. Please see assessment section for further patient specific details. HOME HEALTH CARE: LEVEL 1 STANDARD    - Initial home health evaluation to occur within 24-48 hours, in patient home   - Therapy to evaluate with goal of regaining prior level of functioning   - Therapy to evaluate if patient has [address] needs for personal care    PT recommends initial 24 hour (A) upon d/c    If patient discharges prior to next session this note will serve as a discharge summary. Please see below for the latest assessment towards goals. PT Equipment Recommendations  Equipment Needed: No  Other: owns RW      Patient Diagnosis(es): There were no encounter diagnoses. Past Medical History:  has a past medical history of Acute renal failure ([address] Arthritis, Asthma, COPD (chronic obstructive pulmonary disease) [address] [address] Diabetes mellitus ([address] GERD (gastroesophageal reflux disease), History of gastritis, Hyperlipidemia, Hypertension, Pancreatitis, peripheral neuropathy, and spinal stenosis. Past Surgical History:  has a past surgical history that includes Rotator cuff repair (Bilateral); Cervical spine surgery; Carpal tunnel release (Left); Nerve Surgery; Colonoscopy;  Ankle
Physical Therapy  Facility/Department: [place] [address]  Physical Therapy Daily Treatment    Name: Carole Blas  : 1960  MRN: [id number]  Date of Service: 2023    Discharge Recommendations:  Carole Blas scored a 18/24 on the AM-PAC short mobility form. Current research shows that an AM-PAC score of 18 or greater is typically associated with a discharge to the patient's home setting. Based on the patient's AM-PAC score and their current functional mobility deficits, it is recommended that the patient have 2-3 sessions per week of Physical Therapy at d/c to increase the patient's independence. At this time, this patient demonstrates the endurance and safety to discharge home with home services and a follow up treatment frequency of 2-3x/wk. Please see assessment section for further patient specific details. If patient discharges prior to next session this note will serve as a discharge summary. Please see below for the latest assessment towards goals. PT Equipment Recommendations  Equipment Needed: No  Other: owns RW      Patient Diagnosis(es): There were no encounter diagnoses. Past Medical History:  has a past medical history of Acute renal failure ([address] Arthritis, Asthma, COPD (chronic obstructive pulmonary disease) ([address] Diabetes mellitus ([address] GERD (gastroesophageal reflux disease), History of gastritis, Hyperlipidemia, Hypertension, Pancreatitis, peripheral neuropathy, and spinal stenosis. Past Surgical History:  has a past surgical history that includes Rotator cuff repair (Bilateral); Cervical spine surgery; Carpal tunnel release (Left); Nerve Surgery; Colonoscopy; Ankle surgery (Right, 2013); Foot surgery; Knee Arthroplasty (Left); Revision total knee arthroplasty (Left, 2019); Total knee arthroplasty (Left, 2020); Shoulder arthroscopy (Right); back surgery; lumbar fusion (N/A, 4/10/2023); and lumbar fusion (N/A, 4/10/2023).     Assessment   Body
Pt A&Ox4, VSS on room air with elevation in BP. Endorsing ongoing back pain treated with medications per [facility], reporting some relief. Up as tolerated, x1 CGA. In chair as tolerated. Family at bedside. Tolerating PO diet and fluids but needs encouragement with intake. Voiding adequately via BRP/ Urinal. Denies needs. Standard safety measures in place. Plan of care continues.
Pt A&Ox4, VSS on room air with elevation in BP. Endorsing ongoing back pain treated with medications per [facility], reporting some relief. Up as tolerated, x1 CGA. In chair as tolerated. Family at bedside. Tolerating PO diet and fluids but needs encouragement with intake. Voiding adequately via BRP/ Urinal. Denies needs. Standard safety measures in place. Plan of care continues.
Pt admitted to room 5509. VSS on 2L NC. Dressing to back intact w some minimal drainage noted. Hemovac in place. Melendez draining and patent. All fall precautions in place. Will continue to monitor.
Pt reporting difficulty lifting left leg at this time. Neurosx notified of change in status. Neuro NP to bedside to assess. Stat lumbar CT ordered. PRN Valium administered. Plan of care continues.
Pt states he was in [facility] roon due to seizure activity   Dr Tony Perez aware of this event. Preop pt was in responsive , staring blankly for a short period of time  BMP drawn, potassium 3.3  Wife state he had low potassium in ED yesterday and they gave pt potassium bolus and a Rx to go home with.    Dr Lenard Yanez  notified of pt's condition preop
Pt. AxO x4. VSS. Pt. On RA. Pain being managed per scheduled and PRN meds. Pt. Got up to chair. Tolerated well. Up x1 with GB and walker. Tolerating PO fluids and intake. Voiding adequately per urinal. Denies further needs at this time. All fall precautions in place. Bed locked and in lowest position with alarm on. Call light and bedside table in reach.
Reason for Admission: Lumbar stenosis with neurogenic claudication     Major Shift Events: POD #1. Pt experiencing 7/10 pain throughout shift, PRN medications given. Melendez removed @ 0600. Moderate drainage noted throughout night, dressing changed at 0315. Dressing C/D/I at 0600 check.        Systems Review   Neuro    A&O: x4   NIHSS: 0  Sedation/RASS   RASS: 0   Pain: 5-7/10     Cardiac   Rhythm: NSR    Gtts: NS @ 100    Pulmonary   O2 Modality: RA    GI/    Last BM: MAZIN   I/O:    04/09 0700   04/10 0659 04/10 0700 04/11 0659   P.O. (mL/kg/hr)  240 (0.1)   I.V. (mL/kg)  1896 (21.2)   IV Piggyback (mL/kg)  150 (1.7)   Total Intake(mL/kg)  2286 (25.5)   Urine (mL/kg/hr)  1750 (0.8)   Drains (mL/kg)  170 (1.9)   Total Output(mL/kg)  1920 (21.4)   Net  +366      NPO/PO/TF/TPN/rate(goal): PO   Diet: Regular    Blood Sugar: AC/HS    Hematology   Blood Products: None   VTE Prophylaxis/Anticoagulation: Lovenox    H/H:    Latest Reference Range & Units Most Recent   Hemoglobin Quant 13.5 - 16.5 g/dL 12.1 (L)  4/20/20 09:45   Hematocrit 40 - 50 % 36.5 (L)  4/20/20 09:45   (L): Data is abnormally low    Infectious Disease   Culture results/pending: None   ABX: Ancef    Isolation: None    Skin: C/D/I exception to incision     Lines/Tubes: PIV 20 R FA, PIV 20 L wrist, hemovac drain    Lab or unit collect: LAB     Mobility    PT/OT: upx1 SBA with walker and brace
Report given to Augusta Ferguson RN receiving patient on 5 tower in PACU at [phone] with all information provided and no further questions including medications give while in PACU.
Wife called and updated on patient status
Conjunctivae/corneas clear. Neck: Supple, with full range of motion. No jugular venous distention. Trachea midline. Respiratory:  Normal respiratory effort. Clear to auscultation, bilaterally without Rales/Wheezes/Rhonchi. Cardiovascular: Regular rate and rhythm with normal S1/S2 without murmurs, rubs or gallops. Abdomen: Soft, non-tender, non-distended with normal bowel sounds. Musculoskeletal: No clubbing, cyanosis or edema bilaterally. Full range of motion without deformity. Skin: Skin color, texture, turgor normal.  No rashes or lesions. Neurologic:  Neurovascularly intact without any focal sensory/motor deficits. Cranial nerves: II-XII intact, grossly non-focal.  Psychiatric: Alert and oriented, thought content appropriate, normal insight  Capillary Refill: Brisk, 3 seconds, normal   Peripheral Pulses: +2 palpable, equal bilaterally       Labs:   Recent Labs     04/11/23  0806   WBC 8.5   HGB 12.1*   HCT 37.2*          Recent Labs     04/11/23  0806      K 3.2*      CO2 30   BUN 12   CREATININE 0.9   CALCIUM 8.7       Recent Labs     04/11/23  0806   AST 37   ALT 18   BILITOT 0.3   ALKPHOS 81       No results for input(s): INR in the last 72 hours. No results for input(s): Lavonna Teresa in the last 72 hours. Urinalysis:      Lab Results   Component Value Date/Time    NITRU Negative 02/25/2020 07:40 AM    WBCUA None seen 02/25/2020 07:40 AM    BACTERIA Rare 09/22/2012 12:20 PM    RBCUA 5-10 02/25/2020 07:40 AM    BLOODU TRACE-INTACT 02/25/2020 07:40 AM    SPECGRAV 1.025 02/25/2020 07:40 AM    GLUCOSEU Negative 02/25/2020 07:40 AM    GLUCOSEU NEGATIVE 05/04/2012 01:01 PM       Radiology:  CT LUMBAR SPINE WO CONTRAST   Final Result      1. No acute fracture. Stable degenerative changes compared to 4/10/2023. CT LUMBAR SPINE WO CONTRAST   Final Result   1. Post surgical changes of posterior fusion without evidence of immediate hardware malfunction.    2.  Degenerative
procurement, Patient/Caregiver education & training, Safety education & training, Pain management, Gait training, Self-Care / ADL     Restrictions  Position Activity Restriction  Other position/activity restrictions: ambulate, wear brace when up    Subjective   General  Chart Reviewed: Yes  Patient assessed for rehabilitation services?: Yes  Additional Pertinent Hx: 60 y.o. M admitted 4/10 for L2-S1 B posterolateral fusion. Family / Caregiver Present: Yes (wife)  Referring Practitioner: Rayna Lincoln  Subjective  Subjective: Pt in bed on entry. Has not been OOB since surgery. Pleasant and cooperative. General Comment  Comments: no pain at rest, increasing pain with bed mobility, RN aware and able to medicate during session; pt educated on non-pharm pain management - verb understanding     Social/Functional History  Social/Functional History  Lives With: Spouse  Type of Home: House  Home Layout: Two level, 1/2 bath on main level, Able to Live on Main level with bedroom/bathroom  Home Access: Stairs to enter with rails  Entrance Stairs - Number of Steps: 2 MINERVA  Bathroom Shower/Tub: Tub/Shower unit  Bathroom Toilet: Handicap height  Bathroom Equipment: Tub transfer bench (toilet safety frame upstairs, suction grab bars)  Home Equipment: Earma Thang, rolling  Has the patient had two or more falls in the past year or any fall with injury in the past year?: Yes (unknown number, caused by pain)  ADL Assistance: Independent  Homemaking Assistance: Independent  Ambulation Assistance: Independent (without AD)  Transfer Assistance: Independent  Active : Yes  Type of Occupation: renovations and maintenance  Leisure & Hobbies: yardwork       Objective              Safety Devices  Type of Devices: Chair alarm in place; Left in chair;Gait belt;Nurse notified;Call light within reach  Bed Mobility Training  Bed Mobility Training: Yes  Supine to Sit: Contact-guard assistance  Scooting: Contact-guard assistance  Balance  Sitting:
Social/Functional History  Social/Functional History  Lives With: Spouse  Type of Home: House  Home Layout: Two level, 1/2 bath on main level, Able to Live on Main level with bedroom/bathroom  Home Access: Stairs to enter with rails  Entrance Stairs - Number of Steps: 2 MINERVA  Bathroom Shower/Tub: Tub/Shower unit  Bathroom Toilet: Handicap height  Bathroom Equipment: Tub transfer bench (toilet safety frame upstairs, suction grab bars)  Home Equipment: katalina Corrigan  Has the patient had two or more falls in the past year or any fall with injury in the past year?: Yes (unknown number, caused by pain)  ADL Assistance: [address] [place] [address] Avenue: Independent  Ambulation Assistance: Independent (without AD)  Transfer Assistance: Independent  Active : Yes  Type of Occupation: renovations and maintenance  Leisure & Hobbies: yardwork  Vision/Hearing  Vision  Vision: Impaired  Vision Exceptions: Wears glasses at all times  Hearing  Hearing: Within functional limits    Cognition   Orientation  Overall Orientation Status: Within Normal Limits  Cognition  Overall Cognitive Status: WFL     Objective   Heart Rate: 77  Heart Rate Source: Monitor  BP: (!) 141/82  BP Location: Left upper arm  BP Method: Automatic  Patient Position: Up in chair  MAP (Calculated): 102  Resp: 16  SpO2: 97 %  O2 Device: None (Room air)        Gross Assessment  AROM: Within functional limits  Strength:  Within functional limits  Coordination: Within functional limits  Tone: Normal  Sensation: Intact                 Balance  Sitting: Intact  Standing: With support (CGA with RW)  Bed mobility  Supine to Sit: Contact guard assistance (cues for logroll, use of logroll)  Scooting: Contact guard assistance  Transfers  Sit to Stand: Contact guard assistance (cues for hand placement, use of RW)  Stand to Sit: Contact guard assistance (cues for hand placement)  Stand Pivot Transfers: Contact guard assistance  Ambulation  Surface: Level

## 2023-04-13 NOTE — CARE COORDINATION
Case Management Assessment            Discharge Note                    Date / Time of Note: 4/13/2023 2:18 PM                  Discharge Note Completed by: LENCHO Hays    Patient Name: Starr Rosas   YOB: 1960  Diagnosis: Spinal stenosis of lumbar region, unspecified whether neurogenic claudication present [M48.061]  Lumbar stenosis with neurogenic claudication [M48.062]   Date / Time: 4/10/2023  5:22 AM    Current PCP: Leann Litten, MD  Clinic patient: No    Hospitalization in the last 30 days: No       Advance Directives:  Code Status: Full Code  [address] DNR form completed and on chart: Not Indicated    Financial:  Payor: Jeni Poole / Plan: [place] - [place] PPO / Product Type: *No Product type* /      Pharmacy:    [facility] [address] [facility], [address] [phone] - F [phone]  [address] [facility] [address]  Phone: [phone] Fax: [phone]    [facility] [address] [address] [address] [place] [address] [phone] Karen Les [phone]  [address]  [address] [place] [address]  Phone: [phone] Fax: [phone]      Assistance purchasing medications?: Potential Assistance Purchasing Medications: No  Assistance provided by Case Management: None at this time    Does patient want to participate in local refill/ meds to beds program?: Yes    Meds To Beds General Rules:  1. Can ONLY be done Monday- Friday between 8:30am-5pm  2. Prescription(s) must be in pharmacy by 3pm to be filled same day  3. Copy of patient's insurance/ prescription drug card and patient face sheet must be sent along with the prescription(s)  4. Cost of Rx cannot be added to hospital bill. If financial assistance is needed, please contact unit  or ;  or  CANNOT provide pharmacy voucher for patients co-pays  5.  Patients can then  the prescription on their way out of the hospital at discharge, or pharmacy can deliver to

## 2023-04-13 NOTE — PLAN OF CARE
Problem: Chronic Conditions and Co-morbidities  Goal: Patient's chronic conditions and co-morbidity symptoms are monitored and maintained or improved  4/13/2023 0458 by Dana Chopra RN  Outcome: Progressing  4/13/2023 0457 by Dana Chopra RN  Outcome: Progressing     Problem: Discharge Planning  Goal: Discharge to home or other facility with appropriate resources  Outcome: Progressing     Problem: Safety - Adult  Goal: Free from fall injury  Outcome: Progressing

## 2023-04-13 NOTE — PLAN OF CARE
Problem: Discharge Planning  Goal: Discharge to home or other facility with appropriate resources  4/13/2023 1326 by Jazmin Rose RN  Outcome: Progressing  4/13/2023 0458 by Tyree Romero RN  Outcome: Progressing   Pt involved in discharge planning. Barriers to discharge discussed with pt. Discharge learning needs identified. Discussed with pt any addtional needed resources and transportation plans. Problem: Safety - Adult  Goal: Free from fall injury  4/13/2023 1326 by Jazmin Rose RN  Outcome: Progressing  4/13/2023 0458 by Tyree Romero RN  Outcome: Progressing   All fall precautions in place. Bed locked and in lowest position with alarm on. Overbed table and personal belonings within reach. Call light within reach and patient instructed to use call light for assistance. Non-skid socks on.

## 2023-11-20 ENCOUNTER — HOSPITAL ENCOUNTER (OUTPATIENT)
Dept: PHYSICAL THERAPY | Age: 63
Setting detail: THERAPIES SERIES
Discharge: HOME | End: 2023-11-20
Payer: COMMERCIAL

## 2023-11-20 DIAGNOSIS — M25.60 DECREASED RANGE OF MOTION: Primary | ICD-10-CM

## 2023-11-20 DIAGNOSIS — R52 PAIN: ICD-10-CM

## 2023-11-20 DIAGNOSIS — R26.89 BALANCE DISORDER: ICD-10-CM

## 2023-11-20 DIAGNOSIS — R26.9 GAIT ABNORMALITY: ICD-10-CM

## 2023-11-20 DIAGNOSIS — Z74.09 STIFFNESS DUE TO IMMOBILITY: ICD-10-CM

## 2023-11-20 DIAGNOSIS — R53.1 WEAKNESS: ICD-10-CM

## 2023-11-20 DIAGNOSIS — M25.60 STIFFNESS DUE TO IMMOBILITY: ICD-10-CM

## 2023-11-20 PROCEDURE — 97110 THERAPEUTIC EXERCISES: CPT

## 2023-11-20 PROCEDURE — 97163 PT EVAL HIGH COMPLEX 45 MIN: CPT

## 2023-11-20 NOTE — FLOWSHEET NOTE
lumbar AROM to [facility] to allow for proper joint functioning as indicated by patients functional deficits. Status: [] Progressing: [] Met: [] Not Met: [] Adjusted  Pt to improve strength to 4+/5 or better of proximal hip and UE musculature to allow for proper muscle and joint use in functional mobility, ADLs and prior level of function   Status: [] Progressing: [] Met: [] Not Met: [] Adjusted  Patient will return to  Reaching activities and Lifting and  Usual work, housework or activities and walk 1 mile  without increased symptoms or restriction to work towards return to prior level of function. Status: [] Progressing: [] Met: [] Not Met: [] Adjusted  Patient will be able to participate in all social activities without pain 50% of the time. and Patient will be able to walk up to 2 miles without pain 50% of the time. Status: [] Progressing: [] Met: [] Not Met: [] Adjusted       Overall Progression Towards Functional goals/ Treatment Progress Update:  [] Patient is progressing as expected towards functional goals listed. [] Progression is slowed due to complexities/Impairments listed. [] Progression has been slowed due to co-morbidities. [x] Plan just implemented, too soon (<30days) to assess goals progression   [] Goals require adjustment due to lack of progress  [] Patient is not progressing as expected and requires additional follow up with physician  [] Other:     CHARGE CAPTURE     PT CHARGE GRID   CPT Code (TIMED) minutes # CPT Code (UNTIMED) #     Therex (81390)  15 1  EVAL:HIGH (86441 - Typically 45 minutes face-to-face) 1    Neuromusc. Re-ed (72470)    Re-Eval (77269)     Manual ([id number])    Estim Unattended (34087)     Ther. Act (70565)    [facility].  Traction ([id number])     Gait (97625)    Dry Needle 1-2 muscle (83243)     Aquatic Therex (80344)    Dry Needle

## 2023-12-05 ENCOUNTER — HOSPITAL ENCOUNTER (OUTPATIENT)
Dept: PHYSICAL THERAPY | Age: 63
Setting detail: THERAPIES SERIES
Discharge: HOME | End: 2023-12-05
Payer: COMMERCIAL

## 2023-12-05 PROCEDURE — 97150 GROUP THERAPEUTIC PROCEDURES: CPT

## 2023-12-05 PROCEDURE — 97113 AQUATIC THERAPY/EXERCISES: CPT

## 2023-12-05 NOTE — FLOWSHEET NOTE
[address] [facility] and Therapy [facility] [place], [address] office: [phone] fax: [phone]      Physical Therapy: TREATMENT/PROGRESS NOTE   Patient: Luciana Desouza (54 y.o. male)   Treatment Date: 2023   :  1960 MRN: [id number]   Visit #: 2 / [facility] Needed   As of : Auth not required for PT []Yes    [x]No    Insurance: Payor: Wendie Mis / Plan: Ewndie Mis - [place] PPO / Product Type: *No Product type* /   Insurance ID: [id number] - ([facility])  Secondary Insurance (if applicable): MEDICARE   Treatment Diagnosis:     ICD-10-CM    1. Decreased range of motion  M25.60       2. Gait abnormality  R26.9       3. Pain  R52       4. Stiffness due to immobility  M25.60     Z74.09       5. Weakness  R53.1       6.  Balance disorder  R26.89          Medical Diagnosis:    M79.18 (ICD-10-CM) - Myalgia, other site   M96.1 (ICD-10-CM) - Postlaminectomy syndrome, not elsewhere classified      Referring Physician: Viet Mariscal  PCP: Jeffery Sidhu MD                             Plan of care signed (Y/N):     Date of Patient follow up with Physician:      Progress Report/POC: NO  POC update due: (10 visits /OR [address], whichever is less)  2023     Precautions/ Contra-indications:                                                                                          Latex allergy:  NO  Pacemaker:    NO  Contraindications for Manipulation: recent surgical history (relative), unhealthy/ multiple comorbidities , and remote history of spinal fusion (relative)  Date of Surgery: multiples  Other: Pt had a seizure in 2023; per neurologist: no activity restrictions - PT recommending close eye on pt while in pool, though pt has a pool at home and feels comfortable in water    Preferred Language for Healthcare:   [x]English       []other:    SUBJECTIVE EXAMINATION     Patient Report/Comments: Patient reports he is just feeling very stiff

## 2023-12-11 ENCOUNTER — HOSPITAL ENCOUNTER (OUTPATIENT)
Dept: PHYSICAL THERAPY | Age: 63
Setting detail: THERAPIES SERIES
Discharge: HOME | End: 2023-12-11
Payer: COMMERCIAL

## 2023-12-11 PROCEDURE — 97113 AQUATIC THERAPY/EXERCISES: CPT

## 2023-12-11 PROCEDURE — 97150 GROUP THERAPEUTIC PROCEDURES: CPT

## 2023-12-11 NOTE — FLOWSHEET NOTE
235 [facility] and Therapy [facility], [address]  [facility] office: [phone] fax: [phone]      Physical Therapy: TREATMENT/PROGRESS NOTE   Patient: Guillermina Pacheco (14 y.o. male)   Treatment Date: 2023   :  1960 MRN: [id number]   Visit #: 3 / [facility] Needed   As of : Auth not required for PT []Yes    [x]No    Insurance: Payor: Dameon Sidhu / Plan: Dameon Luna [place] PPO / Product Type: *No Product type* /   Insurance ID: [id number] - ([facility])  Secondary Insurance (if applicable): MEDICARE   Treatment Diagnosis:     ICD-10-CM    1. Decreased range of motion  M25.60       2. Gait abnormality  R26.9       3. Pain  R52       4. Stiffness due to immobility  M25.60     Z74.09       5. Weakness  R53.1       6.  Balance disorder  R26.89          Medical Diagnosis:    M79.18 (ICD-10-CM) - Myalgia, other site   M96.1 (ICD-10-CM) - Postlaminectomy syndrome, not elsewhere classified      Referring Physician: Shani Juarez  PCP: Damari Bower MD                             Plan of care signed (Y/N):     Date of Patient follow up with Physician:      Progress Report/POC: NO  POC update due: (10 visits /OR [address], whichever is less)  2023     Precautions/ Contra-indications:                                                                                          Latex allergy:  NO  Pacemaker:    NO  Contraindications for Manipulation: recent surgical history (relative), unhealthy/ multiple comorbidities , and remote history of spinal fusion (relative)  Date of Surgery: multiples  Other: Pt had a seizure in 2023; per neurologist: no activity restrictions - PT recommending close eye on pt while in pool, though pt has a pool at home and feels comfortable in water    Preferred Language for Healthcare:   [x]English       []other:    SUBJECTIVE EXAMINATION     Patient Report/Comments: Patient reports he is just feeling stiff again

## 2023-12-22 ENCOUNTER — HOSPITAL ENCOUNTER (OUTPATIENT)
Dept: PHYSICAL THERAPY | Age: 63
Setting detail: THERAPIES SERIES
End: 2023-12-22
Payer: COMMERCIAL

## 2023-12-29 ENCOUNTER — HOSPITAL ENCOUNTER (OUTPATIENT)
Dept: PHYSICAL THERAPY | Age: 63
Setting detail: THERAPIES SERIES
End: 2023-12-29
Payer: COMMERCIAL

## 2024-01-03 ENCOUNTER — HOSPITAL ENCOUNTER (OUTPATIENT)
Dept: PHYSICAL THERAPY | Age: 64
Setting detail: THERAPIES SERIES
Discharge: HOME | End: 2024-01-03
Payer: COMMERCIAL

## 2024-01-03 PROCEDURE — 97110 THERAPEUTIC EXERCISES: CPT

## 2024-01-03 PROCEDURE — 97530 THERAPEUTIC ACTIVITIES: CPT

## 2024-01-03 ASSESSMENT — PAIN SCALES - QUEBEC BACK PAIN DISABILITY SCALE
BEND OVER TO CLEAN THE BATHTUB: 4
SIT IN A CHAIR FOR SEVERAL HOURS: 4
TAKE FOOD OUT OF THE REFRIGERATOR: 3
LIFT AND CARRY A HEAVY SUITCASE: 4
MAKE YOUR BED: 4
TURN OVER IN BED: 3
REACH UP TO HIGH SHELVES: 3
RIDE IN A CAR: 2
WALK A FEW BLOCKS OR 300 TO 400M: 3
GET OUT OF BED: 2
RUN ONE BLOCK OR 100M: 5
PULL OR PUSH HEAVY DOORS: 4
CLIMB ONE FLIGHT OF STAIRS: 4
MOVE A CHAIR: 3
THROW A BALL: 4
SLEEP THROUGH THE NIGHT: 5
WALK SEVERAL KILOMETERS  OR MILES: 5
TOTAL SCORE: 71
CARRY TWO BAGS OF GROCERIES: 4
PUT ON SOCKS OR PANYHOSE: 3
STAND UP FOR 20 TO 30 MINUTES: 2

## 2024-01-03 NOTE — PLAN OF CARE
[facility] [address], [place] [address] office: [phone] fax: [phone]  Physical Therapy Re-Certification Plan of Care    Dear Dameon Ortiz  ,    We had the pleasure of treating the following patient for physical therapy services at [facility]. A summary of our findings can be found in the updated assessment below.  This includes our plan of care.  If you have any questions or concerns regarding these findings, please do not hesitate to contact me at the office phone number checked above.  Thank you for the referral.     Physician Signature:________________________________Date:__________________  By signing above (or electronic signature), therapist's plan is approved by physician      Functional Outcome:    Test used Initial score  11/20/23 01/03/2024   Pain Summary VAS 4-7 0/10   Functional questionnaire [place] Back Pain Disability Scale 71 / 71% 71/71%     Serge Austin 1960 continues to present with functional deficits in strength symmetry, flexibility, cardiovascular endurance, and muscle activation  limiting ability with walking on even ground, walking on uneven ground, managing community ambulation, walking up/down stairs, transitions between positions, managing bed mobility, light home activity, and heavy home activity .  During therapy this date, patient required verbal cueing, tactile cueing, and progression of exercises and program for exercise progression, improving proper muscle recruitment and activation/motor control patterns, static and dynamic balance, and improving postural awareness. Patient will continue to benefit from ongoing evaluation and advanced clinical decision from a Physical Therapist to improve muscle strength, neuromuscular control, endurance, normalization of gait, balance and proprioception, functional mobility, proper body mechanics, and coordination to safely return

## 2024-01-08 ENCOUNTER — HOSPITAL ENCOUNTER (OUTPATIENT)
Dept: PHYSICAL THERAPY | Age: 64
Setting detail: THERAPIES SERIES
Discharge: HOME | End: 2024-01-08
Payer: COMMERCIAL

## 2024-01-08 NOTE — PLAN OF CARE
[facility] - Outpatient Rehabilitation and Therapy [address], [place] [address] office: [phone] fax: [phone]      Physical Therapy: TREATMENT/PROGRESS NOTE   Patient: Serge Austin (63 y.o. male)   Treatment Date: 2024   :  1960 MRN: [id number]   Visit #:   Insurance Allowable Auth Needed   As of : Auth not required for PT []Yes    [x]No    Insurance: Payor: BCBS / Plan: BCBS - [place] PPO / Product Type: *No Product type* /   Insurance ID: [id number] - ([facility])  Secondary Insurance (if applicable): MEDICARE   Treatment Diagnosis:     ICD-10-CM    1. Decreased range of motion  M25.60       2. Gait abnormality  R26.9       3. Pain  R52       4. Stiffness due to immobility  M25.60     Z74.09       5. Weakness  R53.1       6. Balance disorder  R26.89          Medical Diagnosis:    M79.18 (ICD-10-CM) - Myalgia, other site   M96.1 (ICD-10-CM) - Postlaminectomy syndrome, not elsewhere classified      Referring Physician: Dameon Ortiz  PCP: Kj Julien MD                             Plan of care signed (Y/N):     Date of Patient follow up with Physician:      Progress Report/POC: YES  1/3/23: POC  POC update due: (10 visits /OR AUTH LIMITS, whichever is less)  2023     Precautions/ Contra-indications:                                                                                          Latex allergy:  NO  Pacemaker:    NO  Contraindications for Manipulation: recent surgical history (relative), unhealthy/ multiple comorbidities , and remote history of spinal fusion (relative)  Date of Surgery: multiples  Other: Pt had a seizure in 2023; per neurologist: no activity restrictions - PT recommending close eye on pt while in pool, though pt has a pool at home and feels comfortable in water    Preferred Language for Healthcare:   [x]English       []other:    SUBJECTIVE EXAMINATION     Patient Report/Comments:  Report increased stiffness in his

## 2024-01-10 ENCOUNTER — HOSPITAL ENCOUNTER (OUTPATIENT)
Dept: PHYSICAL THERAPY | Age: 64
Setting detail: THERAPIES SERIES
Discharge: HOME | End: 2024-01-10
Payer: COMMERCIAL

## 2024-01-10 PROCEDURE — 97113 AQUATIC THERAPY/EXERCISES: CPT

## 2024-01-10 PROCEDURE — 97150 GROUP THERAPEUTIC PROCEDURES: CPT

## 2024-01-10 NOTE — PLAN OF CARE
[facility] - Outpatient Rehabilitation and Therapy [address], [place] [address] office: [phone] fax: [phone]      Physical Therapy: TREATMENT/PROGRESS NOTE   Patient: Serge Austin (63 y.o. male)   Treatment Date: 01/10/2024   :  1960 MRN: [id number]   Visit #:   Insurance Allowable Auth Needed   As of : Auth not required for PT []Yes    [x]No    Insurance: Payor: BCBS / Plan: BCBS - [place] PPO / Product Type: *No Product type* /   Insurance ID: [id number] - ([facility])  Secondary Insurance (if applicable): MEDICARE   Treatment Diagnosis:     ICD-10-CM    1. Decreased range of motion  M25.60       2. Gait abnormality  R26.9       3. Pain  R52       4. Stiffness due to immobility  M25.60     Z74.09       5. Weakness  R53.1       6. Balance disorder  R26.89          Medical Diagnosis:    M79.18 (ICD-10-CM) - Myalgia, other site   M96.1 (ICD-10-CM) - Postlaminectomy syndrome, not elsewhere classified      Referring Physician: Dameon Ortiz  PCP: Kj Julien MD                             Plan of care signed (Y/N):     Date of Patient follow up with Physician:      Progress Report/POC: NO  1/3/23: POC  POC update due: (10 visits /OR AUTH LIMITS, whichever is less)  2/3/2024     Precautions/ Contra-indications:                                                                                          Latex allergy:  NO  Pacemaker:    NO  Contraindications for Manipulation: recent surgical history (relative), unhealthy/ multiple comorbidities , and remote history of spinal fusion (relative)  Date of Surgery: multiples  Other: Pt had a seizure in 2023; per neurologist: no activity restrictions - PT recommending close eye on pt while in pool, though pt has a pool at home and feels comfortable in water    Preferred Language for Healthcare:   [x]English       []other:    SUBJECTIVE EXAMINATION     Patient Report/Comments:  Reports he has been moving all day

## 2024-01-15 ENCOUNTER — HOSPITAL ENCOUNTER (OUTPATIENT)
Dept: PHYSICAL THERAPY | Age: 64
Setting detail: THERAPIES SERIES
Discharge: HOME | End: 2024-01-15
Payer: COMMERCIAL

## 2024-01-15 PROCEDURE — 97113 AQUATIC THERAPY/EXERCISES: CPT

## 2024-01-15 NOTE — FLOWSHEET NOTE
and lumbar AROM to WFL to allow for proper joint functioning as indicated by patients functional deficits.  Status: [x] Progressing: [] Met: [] Not Met: [] Adjusted  Pt to improve strength to 4+/5 or better of proximal hip and UE musculature to allow for proper muscle and joint use in functional mobility, ADLs and prior level of function   Status: [x] Progressing: [] Met: [] Not Met: [] Adjusted  Patient will return to  Reaching activities and Lifting and  Usual work, housework or activities and walk 1 mile  without increased symptoms or restriction to work towards return to prior level of function.                                          Status: [x] Progressing: [] Met: [] Not Met: [] Adjusted  Patient will be able to participate in all social activities without pain 50% of the time.  and Patient will be able to walk up to 2 miles without pain 50% of the time.                                                                                                                     Status: [x] Progressing: [] Met: [] Not Met: [] Adjusted       Overall Progression Towards Functional goals/ Treatment Progress Update:  [] Patient is progressing as expected towards functional goals listed.    [x] Progression is slowed due to complexities/Impairments listed.  [] Progression has been slowed due to co-morbidities.  [] Plan just implemented, too soon (<30days) to assess goals progression   [] Goals require adjustment due to lack of progress  [] Patient is not progressing as expected and requires additional follow up with physician  [] Other:     CHARGE CAPTURE     PT CHARGE GRID   CPT Code (TIMED) minutes # CPT Code (UNTIMED) #     Therex (19091)     EVAL:HIGH (16323 - Typically 45 minutes face-to-face)     Neuromusc. Re-ed (61599)    Re-Eval (10076)     Manual (89340)    Estim Unattended (28242)     Ther. Act (44929)    [facility]. Traction (93634)     Gait (89204)    Dry Needle 1-2 muscle (58335)     Aquatic Therex (32289) 30 2  Dry

## 2024-01-17 ENCOUNTER — HOSPITAL ENCOUNTER (OUTPATIENT)
Dept: PHYSICAL THERAPY | Age: 64
Setting detail: THERAPIES SERIES
Discharge: HOME | End: 2024-01-17
Payer: COMMERCIAL

## 2024-01-17 PROCEDURE — 97113 AQUATIC THERAPY/EXERCISES: CPT

## 2024-01-17 PROCEDURE — 97150 GROUP THERAPEUTIC PROCEDURES: CPT

## 2024-01-17 NOTE — FLOWSHEET NOTE
[facility] - Outpatient Rehabilitation and Therapy [address], [place] [address] office: [phone] fax: [phone]      Physical Therapy: TREATMENT/PROGRESS NOTE   Patient: Serge Austin (63 y.o. male)   Treatment Date: 2024   :  1960 MRN: [id number]   Visit #:   Insurance Allowable Auth Needed   As of : Auth not required for PT []Yes    [x]No    Insurance: Payor: BCBS / Plan: BCBS [place] PPO / Product Type: *No Product type* /   Insurance ID: [id number] - ([facility])  Secondary Insurance (if applicable): MEDICARE   Treatment Diagnosis:     ICD-10-CM    1. Decreased range of motion  M25.60       2. Gait abnormality  R26.9       3. Pain  R52       4. Stiffness due to immobility  M25.60     Z74.09       5. Weakness  R53.1       6. Balance disorder  R26.89          Medical Diagnosis:    M79.18 (ICD-10-CM) - Myalgia, other site   M96.1 (ICD-10-CM) - Postlaminectomy syndrome, not elsewhere classified      Referring Physician: Dameon Ortiz  PCP: Kj Julien MD                             Plan of care signed (Y/N):     Date of Patient follow up with Physician:      Progress Report/POC: NO  1/3/23: POC  POC update due: (10 visits /OR AUTH LIMITS, whichever is less)  2/3/2024     Precautions/ Contra-indications:                                                                                          Latex allergy:  NO  Pacemaker:    NO  Contraindications for Manipulation: recent surgical history (relative), unhealthy/ multiple comorbidities , and remote history of spinal fusion (relative)  Date of Surgery: multiples  Other: Pt had a seizure in 2023; per neurologist: no activity restrictions - PT recommending close eye on pt while in pool, though pt has a pool at home and feels comfortable in water    Preferred Language for Healthcare:   [x]English       []other:    SUBJECTIVE EXAMINATION     Patient Report/Comments:  Pt states his steroid shot is

## 2024-01-18 ENCOUNTER — HOSPITAL ENCOUNTER (INPATIENT)
Age: 64
LOS: 4 days | Discharge: ANOTHER HOSPITAL | DRG: 199 | End: 2024-01-22
Attending: EMERGENCY MEDICINE | Admitting: FAMILY MEDICINE
Payer: COMMERCIAL

## 2024-01-18 ENCOUNTER — APPOINTMENT (OUTPATIENT)
Dept: GENERAL RADIOLOGY | Age: 64
DRG: 199 | End: 2024-01-18
Payer: COMMERCIAL

## 2024-01-18 DIAGNOSIS — E87.5 HYPERKALEMIA: ICD-10-CM

## 2024-01-18 DIAGNOSIS — U07.1 COVID-19: ICD-10-CM

## 2024-01-18 DIAGNOSIS — J93.9 PNEUMOTHORAX ON RIGHT: Primary | ICD-10-CM

## 2024-01-18 LAB
ANION GAP SERPL CALCULATED.3IONS-SCNC: 11 MMOL/L (ref 3–16)
BASE EXCESS BLDV CALC-SCNC: 4.1 MMOL/L (ref -3–3)
BASOPHILS # BLD: 0.1 K/UL (ref 0–0.2)
BASOPHILS NFR BLD: 0.9 %
BUN SERPL-MCNC: 19 MG/DL (ref 7–20)
CALCIUM SERPL-MCNC: 9.2 MG/DL (ref 8.3–10.6)
CHLORIDE SERPL-SCNC: 101 MMOL/L (ref 99–110)
CO2 BLDV-SCNC: 64 MMOL/L
CO2 SERPL-SCNC: 25 MMOL/L (ref 21–32)
COHGB MFR BLDV: 5.1 % (ref 0–1.5)
CREAT SERPL-MCNC: 1.1 MG/DL (ref 0.8–1.3)
D DIMER: 0.89 UG/ML FEU (ref 0–0.6)
DEPRECATED RDW RBC AUTO: 15.1 % (ref 12.4–15.4)
EOSINOPHIL # BLD: 0.1 K/UL (ref 0–0.6)
EOSINOPHIL NFR BLD: 1.9 %
FLUAV RNA RESP QL NAA+PROBE: NOT DETECTED
FLUBV RNA RESP QL NAA+PROBE: NOT DETECTED
GFR SERPLBLD CREATININE-BSD FMLA CKD-EPI: >60 ML/MIN/{1.73_M2}
GLUCOSE SERPL-MCNC: 119 MG/DL (ref 70–99)
HCO3 BLDV-SCNC: 27.3 MMOL/L (ref 23–29)
HCT VFR BLD AUTO: 42.2 % (ref 40.5–52.5)
HGB BLD-MCNC: 14.3 G/DL (ref 13.5–17.5)
LYMPHOCYTES # BLD: 1.7 K/UL (ref 1–5.1)
LYMPHOCYTES NFR BLD: 28.5 %
MCH RBC QN AUTO: 34 PG (ref 26–34)
MCHC RBC AUTO-ENTMCNC: 33.9 G/DL (ref 31–36)
MCV RBC AUTO: 100.2 FL (ref 80–100)
METHGB MFR BLDV: 0.5 %
MONOCYTES # BLD: 0.6 K/UL (ref 0–1.3)
MONOCYTES NFR BLD: 10.3 %
NEUTROPHILS # BLD: 3.4 K/UL (ref 1.7–7.7)
NEUTROPHILS NFR BLD: 58.4 %
O2 CT VFR BLDV CALC: 20 VOL %
O2 THERAPY: ABNORMAL
PCO2 BLDV: 35.7 MMHG (ref 40–50)
PH BLDV: 7.49 [PH] (ref 7.35–7.45)
PLATELET # BLD AUTO: 168 K/UL (ref 135–450)
PMV BLD AUTO: 8.4 FL (ref 5–10.5)
PO2 BLDV: 96.3 MMHG (ref 25–40)
POTASSIUM SERPL-SCNC: 5.5 MMOL/L (ref 3.5–5.1)
RBC # BLD AUTO: 4.22 M/UL (ref 4.2–5.9)
SAO2 % BLDV: 99 %
SARS-COV-2 RNA RESP QL NAA+PROBE: DETECTED
SODIUM SERPL-SCNC: 137 MMOL/L (ref 136–145)
TROPONIN, HIGH SENSITIVITY: 15 NG/L (ref 0–22)
TROPONIN, HIGH SENSITIVITY: 15 NG/L (ref 0–22)
WBC # BLD AUTO: 5.9 K/UL (ref 4–11)

## 2024-01-18 PROCEDURE — 82803 BLOOD GASES ANY COMBINATION: CPT

## 2024-01-18 PROCEDURE — 6370000000 HC RX 637 (ALT 250 FOR IP)

## 2024-01-18 PROCEDURE — 96374 THER/PROPH/DIAG INJ IV PUSH: CPT

## 2024-01-18 PROCEDURE — 85025 COMPLETE CBC W/AUTO DIFF WBC: CPT

## 2024-01-18 PROCEDURE — 0W9930Z DRAINAGE OF RIGHT PLEURAL CAVITY WITH DRAINAGE DEVICE, PERCUTANEOUS APPROACH: ICD-10-PCS | Performed by: EMERGENCY MEDICINE

## 2024-01-18 PROCEDURE — 80048 BASIC METABOLIC PNL TOTAL CA: CPT

## 2024-01-18 PROCEDURE — 85379 FIBRIN DEGRADATION QUANT: CPT

## 2024-01-18 PROCEDURE — 2060000000 HC ICU INTERMEDIATE R&B

## 2024-01-18 PROCEDURE — 32554 ASPIRATE PLEURA W/O IMAGING: CPT

## 2024-01-18 PROCEDURE — 71045 X-RAY EXAM CHEST 1 VIEW: CPT

## 2024-01-18 PROCEDURE — 6360000002 HC RX W HCPCS: Performed by: FAMILY MEDICINE

## 2024-01-18 PROCEDURE — 6360000002 HC RX W HCPCS: Performed by: EMERGENCY MEDICINE

## 2024-01-18 PROCEDURE — 87636 SARSCOV2 & INF A&B AMP PRB: CPT

## 2024-01-18 PROCEDURE — 93005 ELECTROCARDIOGRAM TRACING: CPT

## 2024-01-18 PROCEDURE — 84484 ASSAY OF TROPONIN QUANT: CPT

## 2024-01-18 PROCEDURE — 99285 EMERGENCY DEPT VISIT HI MDM: CPT

## 2024-01-18 PROCEDURE — 96375 TX/PRO/DX INJ NEW DRUG ADDON: CPT

## 2024-01-18 PROCEDURE — 6360000002 HC RX W HCPCS

## 2024-01-18 RX ORDER — ACETAMINOPHEN 650 MG/1
650 SUPPOSITORY RECTAL EVERY 6 HOURS PRN
Status: DISCONTINUED | OUTPATIENT
Start: 2024-01-18 | End: 2024-01-22 | Stop reason: HOSPADM

## 2024-01-18 RX ORDER — SENNA AND DOCUSATE SODIUM 50; 8.6 MG/1; MG/1
1 TABLET, FILM COATED ORAL 2 TIMES DAILY
Status: DISCONTINUED | OUTPATIENT
Start: 2024-01-18 | End: 2024-01-18

## 2024-01-18 RX ORDER — ONDANSETRON 2 MG/ML
4 INJECTION INTRAMUSCULAR; INTRAVENOUS EVERY 6 HOURS PRN
Status: DISCONTINUED | OUTPATIENT
Start: 2024-01-18 | End: 2024-01-22 | Stop reason: HOSPADM

## 2024-01-18 RX ORDER — POLYETHYLENE GLYCOL 3350 17 G/17G
17 POWDER, FOR SOLUTION ORAL DAILY PRN
Status: DISCONTINUED | OUTPATIENT
Start: 2024-01-18 | End: 2024-01-22 | Stop reason: HOSPADM

## 2024-01-18 RX ORDER — ACETAMINOPHEN 325 MG/1
650 TABLET ORAL EVERY 6 HOURS PRN
Status: DISCONTINUED | OUTPATIENT
Start: 2024-01-18 | End: 2024-01-22 | Stop reason: HOSPADM

## 2024-01-18 RX ORDER — ONDANSETRON 4 MG/1
4 TABLET, ORALLY DISINTEGRATING ORAL EVERY 8 HOURS PRN
Status: DISCONTINUED | OUTPATIENT
Start: 2024-01-18 | End: 2024-01-22 | Stop reason: HOSPADM

## 2024-01-18 RX ORDER — PRAVASTATIN SODIUM 20 MG
20 TABLET ORAL NIGHTLY
Status: DISCONTINUED | OUTPATIENT
Start: 2024-01-18 | End: 2024-01-22 | Stop reason: HOSPADM

## 2024-01-18 RX ORDER — SODIUM CHLORIDE 0.9 % (FLUSH) 0.9 %
5-40 SYRINGE (ML) INJECTION EVERY 12 HOURS SCHEDULED
Status: DISCONTINUED | OUTPATIENT
Start: 2024-01-18 | End: 2024-01-22 | Stop reason: HOSPADM

## 2024-01-18 RX ORDER — SODIUM CHLORIDE 9 MG/ML
INJECTION, SOLUTION INTRAVENOUS PRN
Status: DISCONTINUED | OUTPATIENT
Start: 2024-01-18 | End: 2024-01-22 | Stop reason: HOSPADM

## 2024-01-18 RX ORDER — HYDROXYCHLOROQUINE SULFATE 200 MG/1
200 TABLET, FILM COATED ORAL 2 TIMES DAILY
Status: DISCONTINUED | OUTPATIENT
Start: 2024-01-19 | End: 2024-01-22 | Stop reason: HOSPADM

## 2024-01-18 RX ORDER — IPRATROPIUM BROMIDE AND ALBUTEROL SULFATE 2.5; .5 MG/3ML; MG/3ML
1 SOLUTION RESPIRATORY (INHALATION)
Status: DISCONTINUED | OUTPATIENT
Start: 2024-01-19 | End: 2024-01-19

## 2024-01-18 RX ORDER — ENOXAPARIN SODIUM 100 MG/ML
40 INJECTION SUBCUTANEOUS DAILY
Status: DISCONTINUED | OUTPATIENT
Start: 2024-01-19 | End: 2024-01-22 | Stop reason: HOSPADM

## 2024-01-18 RX ORDER — SODIUM CHLORIDE 0.9 % (FLUSH) 0.9 %
5-40 SYRINGE (ML) INJECTION PRN
Status: DISCONTINUED | OUTPATIENT
Start: 2024-01-18 | End: 2024-01-22 | Stop reason: HOSPADM

## 2024-01-18 RX ORDER — FENTANYL CITRATE 50 UG/ML
25 INJECTION, SOLUTION INTRAMUSCULAR; INTRAVENOUS ONCE
Status: COMPLETED | OUTPATIENT
Start: 2024-01-18 | End: 2024-01-18

## 2024-01-18 RX ORDER — METHOCARBAMOL 500 MG/1
750 TABLET, FILM COATED ORAL 4 TIMES DAILY
Status: DISCONTINUED | OUTPATIENT
Start: 2024-01-18 | End: 2024-01-18

## 2024-01-18 RX ORDER — MORPHINE SULFATE 2 MG/ML
2 INJECTION, SOLUTION INTRAMUSCULAR; INTRAVENOUS
Status: DISCONTINUED | OUTPATIENT
Start: 2024-01-18 | End: 2024-01-19

## 2024-01-18 RX ORDER — HYDROCHLOROTHIAZIDE 25 MG/1
25 TABLET ORAL DAILY
Status: DISCONTINUED | OUTPATIENT
Start: 2024-01-19 | End: 2024-01-22 | Stop reason: HOSPADM

## 2024-01-18 RX ORDER — ASPIRIN 81 MG/1
324 TABLET, CHEWABLE ORAL ONCE
Status: COMPLETED | OUTPATIENT
Start: 2024-01-18 | End: 2024-01-18

## 2024-01-18 RX ORDER — FOLIC ACID 1 MG/1
1 TABLET ORAL 2 TIMES DAILY
Status: DISCONTINUED | OUTPATIENT
Start: 2024-01-18 | End: 2024-01-22 | Stop reason: HOSPADM

## 2024-01-18 RX ORDER — PREGABALIN 75 MG/1
150 CAPSULE ORAL 2 TIMES DAILY
Status: DISCONTINUED | OUTPATIENT
Start: 2024-01-18 | End: 2024-01-18

## 2024-01-18 RX ORDER — MORPHINE SULFATE 4 MG/ML
4 INJECTION, SOLUTION INTRAMUSCULAR; INTRAVENOUS
Status: DISCONTINUED | OUTPATIENT
Start: 2024-01-18 | End: 2024-01-19

## 2024-01-18 RX ORDER — HYDROMORPHONE HYDROCHLORIDE 1 MG/ML
1 INJECTION, SOLUTION INTRAMUSCULAR; INTRAVENOUS; SUBCUTANEOUS ONCE
Status: COMPLETED | OUTPATIENT
Start: 2024-01-18 | End: 2024-01-18

## 2024-01-18 RX ORDER — AMLODIPINE BESYLATE 5 MG/1
10 TABLET ORAL DAILY
Status: DISCONTINUED | OUTPATIENT
Start: 2024-01-19 | End: 2024-01-22 | Stop reason: HOSPADM

## 2024-01-18 RX ADMIN — HYDROMORPHONE HYDROCHLORIDE 1 MG: 1 INJECTION, SOLUTION INTRAMUSCULAR; INTRAVENOUS; SUBCUTANEOUS at 20:50

## 2024-01-18 RX ADMIN — FENTANYL CITRATE 25 MCG: 50 INJECTION INTRAMUSCULAR; INTRAVENOUS at 21:37

## 2024-01-18 RX ADMIN — ASPIRIN 81 MG 324 MG: 81 TABLET ORAL at 19:16

## 2024-01-18 RX ADMIN — MORPHINE SULFATE 4 MG: 4 INJECTION, SOLUTION INTRAMUSCULAR; INTRAVENOUS at 23:03

## 2024-01-18 ASSESSMENT — PAIN DESCRIPTION - ORIENTATION: ORIENTATION: RIGHT

## 2024-01-18 ASSESSMENT — PAIN DESCRIPTION - LOCATION: LOCATION: CHEST

## 2024-01-18 ASSESSMENT — PAIN SCALES - GENERAL
PAINLEVEL_OUTOF10: 5
PAINLEVEL_OUTOF10: 9
PAINLEVEL_OUTOF10: 7

## 2024-01-18 ASSESSMENT — PAIN - FUNCTIONAL ASSESSMENT: PAIN_FUNCTIONAL_ASSESSMENT: NONE - DENIES PAIN

## 2024-01-19 ENCOUNTER — APPOINTMENT (OUTPATIENT)
Dept: GENERAL RADIOLOGY | Age: 64
DRG: 199 | End: 2024-01-19
Payer: COMMERCIAL

## 2024-01-19 ENCOUNTER — APPOINTMENT (OUTPATIENT)
Dept: CT IMAGING | Age: 64
DRG: 199 | End: 2024-01-19
Payer: COMMERCIAL

## 2024-01-19 LAB
25(OH)D3 SERPL-MCNC: 40 NG/ML
ANION GAP SERPL CALCULATED.3IONS-SCNC: 10 MMOL/L (ref 3–16)
ANION GAP SERPL CALCULATED.3IONS-SCNC: 18 MMOL/L (ref 3–16)
BASOPHILS # BLD: 0 K/UL (ref 0–0.2)
BASOPHILS # BLD: 0.1 K/UL (ref 0–0.2)
BASOPHILS NFR BLD: 0.7 %
BASOPHILS NFR BLD: 0.8 %
BUN SERPL-MCNC: 13 MG/DL (ref 7–20)
BUN SERPL-MCNC: 17 MG/DL (ref 7–20)
CALCIUM SERPL-MCNC: 9.2 MG/DL (ref 8.3–10.6)
CALCIUM SERPL-MCNC: 9.6 MG/DL (ref 8.3–10.6)
CHLORIDE SERPL-SCNC: 103 MMOL/L (ref 99–110)
CHLORIDE SERPL-SCNC: 96 MMOL/L (ref 99–110)
CO2 SERPL-SCNC: 24 MMOL/L (ref 21–32)
CO2 SERPL-SCNC: 28 MMOL/L (ref 21–32)
CREAT SERPL-MCNC: 0.9 MG/DL (ref 0.8–1.3)
CREAT SERPL-MCNC: 0.9 MG/DL (ref 0.8–1.3)
CRP SERPL-MCNC: 6.7 MG/L (ref 0–5.1)
D DIMER: 0.82 UG/ML FEU (ref 0–0.6)
DEPRECATED RDW RBC AUTO: 15.7 % (ref 12.4–15.4)
DEPRECATED RDW RBC AUTO: 15.8 % (ref 12.4–15.4)
EOSINOPHIL # BLD: 0.1 K/UL (ref 0–0.6)
EOSINOPHIL # BLD: 0.1 K/UL (ref 0–0.6)
EOSINOPHIL NFR BLD: 1.4 %
EOSINOPHIL NFR BLD: 1.5 %
GFR SERPLBLD CREATININE-BSD FMLA CKD-EPI: >60 ML/MIN/{1.73_M2}
GFR SERPLBLD CREATININE-BSD FMLA CKD-EPI: >60 ML/MIN/{1.73_M2}
GLUCOSE BLD-MCNC: 100 MG/DL (ref 70–99)
GLUCOSE BLD-MCNC: 118 MG/DL (ref 70–99)
GLUCOSE BLD-MCNC: 131 MG/DL (ref 70–99)
GLUCOSE BLD-MCNC: 132 MG/DL (ref 70–99)
GLUCOSE BLD-MCNC: 147 MG/DL (ref 70–99)
GLUCOSE BLD-MCNC: 85 MG/DL (ref 70–99)
GLUCOSE SERPL-MCNC: 100 MG/DL (ref 70–99)
GLUCOSE SERPL-MCNC: 141 MG/DL (ref 70–99)
HCT VFR BLD AUTO: 41.4 % (ref 40.5–52.5)
HCT VFR BLD AUTO: 44.9 % (ref 40.5–52.5)
HGB BLD-MCNC: 14.3 G/DL (ref 13.5–17.5)
HGB BLD-MCNC: 15.2 G/DL (ref 13.5–17.5)
LYMPHOCYTES # BLD: 1.6 K/UL (ref 1–5.1)
LYMPHOCYTES # BLD: 2.1 K/UL (ref 1–5.1)
LYMPHOCYTES NFR BLD: 24.7 %
LYMPHOCYTES NFR BLD: 31.9 %
MCH RBC QN AUTO: 34.1 PG (ref 26–34)
MCH RBC QN AUTO: 34.3 PG (ref 26–34)
MCHC RBC AUTO-ENTMCNC: 33.9 G/DL (ref 31–36)
MCHC RBC AUTO-ENTMCNC: 34.5 G/DL (ref 31–36)
MCV RBC AUTO: 100.7 FL (ref 80–100)
MCV RBC AUTO: 99.3 FL (ref 80–100)
MONOCYTES # BLD: 0.6 K/UL (ref 0–1.3)
MONOCYTES # BLD: 0.7 K/UL (ref 0–1.3)
MONOCYTES NFR BLD: 11 %
MONOCYTES NFR BLD: 9.1 %
NEUTROPHILS # BLD: 3.6 K/UL (ref 1.7–7.7)
NEUTROPHILS # BLD: 4.2 K/UL (ref 1.7–7.7)
NEUTROPHILS NFR BLD: 54.9 %
NEUTROPHILS NFR BLD: 64 %
PERFORMED ON: ABNORMAL
PERFORMED ON: NORMAL
PLATELET # BLD AUTO: 161 K/UL (ref 135–450)
PLATELET # BLD AUTO: 181 K/UL (ref 135–450)
PMV BLD AUTO: 8.1 FL (ref 5–10.5)
PMV BLD AUTO: 8.2 FL (ref 5–10.5)
POTASSIUM SERPL-SCNC: 3.7 MMOL/L (ref 3.5–5.1)
POTASSIUM SERPL-SCNC: 3.8 MMOL/L (ref 3.5–5.1)
RBC # BLD AUTO: 4.17 M/UL (ref 4.2–5.9)
RBC # BLD AUTO: 4.46 M/UL (ref 4.2–5.9)
SODIUM SERPL-SCNC: 138 MMOL/L (ref 136–145)
SODIUM SERPL-SCNC: 141 MMOL/L (ref 136–145)
TROPONIN, HIGH SENSITIVITY: 17 NG/L (ref 0–22)
WBC # BLD AUTO: 6.5 K/UL (ref 4–11)
WBC # BLD AUTO: 6.6 K/UL (ref 4–11)

## 2024-01-19 PROCEDURE — 6360000002 HC RX W HCPCS: Performed by: NURSE PRACTITIONER

## 2024-01-19 PROCEDURE — 6370000000 HC RX 637 (ALT 250 FOR IP): Performed by: NURSE PRACTITIONER

## 2024-01-19 PROCEDURE — 82306 VITAMIN D 25 HYDROXY: CPT

## 2024-01-19 PROCEDURE — 6360000004 HC RX CONTRAST MEDICATION: Performed by: INTERNAL MEDICINE

## 2024-01-19 PROCEDURE — 6360000002 HC RX W HCPCS: Performed by: FAMILY MEDICINE

## 2024-01-19 PROCEDURE — 85025 COMPLETE CBC W/AUTO DIFF WBC: CPT

## 2024-01-19 PROCEDURE — 70496 CT ANGIOGRAPHY HEAD: CPT

## 2024-01-19 PROCEDURE — 71045 X-RAY EXAM CHEST 1 VIEW: CPT

## 2024-01-19 PROCEDURE — 99223 1ST HOSP IP/OBS HIGH 75: CPT | Performed by: INTERNAL MEDICINE

## 2024-01-19 PROCEDURE — 86140 C-REACTIVE PROTEIN: CPT

## 2024-01-19 PROCEDURE — 70450 CT HEAD/BRAIN W/O DYE: CPT

## 2024-01-19 PROCEDURE — 94761 N-INVAS EAR/PLS OXIMETRY MLT: CPT

## 2024-01-19 PROCEDURE — 80048 BASIC METABOLIC PNL TOTAL CA: CPT

## 2024-01-19 PROCEDURE — 94640 AIRWAY INHALATION TREATMENT: CPT

## 2024-01-19 PROCEDURE — 84484 ASSAY OF TROPONIN QUANT: CPT

## 2024-01-19 PROCEDURE — 2060000000 HC ICU INTERMEDIATE R&B

## 2024-01-19 PROCEDURE — 70498 CT ANGIOGRAPHY NECK: CPT

## 2024-01-19 PROCEDURE — 71260 CT THORAX DX C+: CPT

## 2024-01-19 PROCEDURE — 2580000003 HC RX 258: Performed by: FAMILY MEDICINE

## 2024-01-19 PROCEDURE — 85379 FIBRIN DEGRADATION QUANT: CPT

## 2024-01-19 PROCEDURE — 36415 COLL VENOUS BLD VENIPUNCTURE: CPT

## 2024-01-19 PROCEDURE — 6370000000 HC RX 637 (ALT 250 FOR IP): Performed by: FAMILY MEDICINE

## 2024-01-19 RX ORDER — HYDROMORPHONE HYDROCHLORIDE 1 MG/ML
1 INJECTION, SOLUTION INTRAMUSCULAR; INTRAVENOUS; SUBCUTANEOUS ONCE
Status: COMPLETED | OUTPATIENT
Start: 2024-01-19 | End: 2024-01-19

## 2024-01-19 RX ORDER — HYDROMORPHONE HYDROCHLORIDE 1 MG/ML
0.5 INJECTION, SOLUTION INTRAMUSCULAR; INTRAVENOUS; SUBCUTANEOUS EVERY 4 HOURS PRN
Status: DISCONTINUED | OUTPATIENT
Start: 2024-01-19 | End: 2024-01-22 | Stop reason: HOSPADM

## 2024-01-19 RX ORDER — IPRATROPIUM BROMIDE AND ALBUTEROL SULFATE 2.5; .5 MG/3ML; MG/3ML
1 SOLUTION RESPIRATORY (INHALATION)
Status: DISCONTINUED | OUTPATIENT
Start: 2024-01-20 | End: 2024-01-21

## 2024-01-19 RX ORDER — METHOCARBAMOL 500 MG/1
750 TABLET, FILM COATED ORAL 4 TIMES DAILY
Status: DISCONTINUED | OUTPATIENT
Start: 2024-01-19 | End: 2024-01-22 | Stop reason: HOSPADM

## 2024-01-19 RX ORDER — PREGABALIN 75 MG/1
150 CAPSULE ORAL 2 TIMES DAILY
Status: DISCONTINUED | OUTPATIENT
Start: 2024-01-19 | End: 2024-01-22 | Stop reason: HOSPADM

## 2024-01-19 RX ORDER — OXYCODONE HYDROCHLORIDE 5 MG/1
5 TABLET ORAL EVERY 4 HOURS PRN
Status: DISCONTINUED | OUTPATIENT
Start: 2024-01-19 | End: 2024-01-22 | Stop reason: HOSPADM

## 2024-01-19 RX ADMIN — FOLIC ACID 1 MG: 1 TABLET ORAL at 00:25

## 2024-01-19 RX ADMIN — PREGABALIN 150 MG: 75 CAPSULE ORAL at 20:21

## 2024-01-19 RX ADMIN — PRAVASTATIN SODIUM 20 MG: 20 TABLET ORAL at 00:25

## 2024-01-19 RX ADMIN — HYDROMORPHONE HYDROCHLORIDE 0.5 MG: 1 INJECTION, SOLUTION INTRAMUSCULAR; INTRAVENOUS; SUBCUTANEOUS at 20:21

## 2024-01-19 RX ADMIN — ENOXAPARIN SODIUM 40 MG: 100 INJECTION SUBCUTANEOUS at 08:11

## 2024-01-19 RX ADMIN — HYDROXYCHLOROQUINE SULFATE 200 MG: 200 TABLET, FILM COATED ORAL at 08:08

## 2024-01-19 RX ADMIN — IPRATROPIUM BROMIDE AND ALBUTEROL SULFATE 1 DOSE: .5; 3 SOLUTION RESPIRATORY (INHALATION) at 15:56

## 2024-01-19 RX ADMIN — HYDROMORPHONE HYDROCHLORIDE 0.5 MG: 1 INJECTION, SOLUTION INTRAMUSCULAR; INTRAVENOUS; SUBCUTANEOUS at 14:06

## 2024-01-19 RX ADMIN — HYDROXYCHLOROQUINE SULFATE 200 MG: 200 TABLET, FILM COATED ORAL at 20:21

## 2024-01-19 RX ADMIN — HYDROMORPHONE HYDROCHLORIDE 0.5 MG: 1 INJECTION, SOLUTION INTRAMUSCULAR; INTRAVENOUS; SUBCUTANEOUS at 07:53

## 2024-01-19 RX ADMIN — AMLODIPINE BESYLATE 10 MG: 5 TABLET ORAL at 08:08

## 2024-01-19 RX ADMIN — IPRATROPIUM BROMIDE AND ALBUTEROL SULFATE 1 DOSE: .5; 3 SOLUTION RESPIRATORY (INHALATION) at 20:52

## 2024-01-19 RX ADMIN — METHOCARBAMOL 750 MG: 500 TABLET ORAL at 00:25

## 2024-01-19 RX ADMIN — Medication 10 ML: at 00:25

## 2024-01-19 RX ADMIN — METHOCARBAMOL 750 MG: 500 TABLET ORAL at 16:21

## 2024-01-19 RX ADMIN — ONDANSETRON 4 MG: 2 INJECTION INTRAMUSCULAR; INTRAVENOUS at 08:08

## 2024-01-19 RX ADMIN — OXYCODONE HYDROCHLORIDE 5 MG: 5 TABLET ORAL at 16:20

## 2024-01-19 RX ADMIN — HYDROXYCHLOROQUINE SULFATE 200 MG: 200 TABLET, FILM COATED ORAL at 00:25

## 2024-01-19 RX ADMIN — HYDROMORPHONE HYDROCHLORIDE 1 MG: 1 INJECTION, SOLUTION INTRAMUSCULAR; INTRAVENOUS; SUBCUTANEOUS at 04:11

## 2024-01-19 RX ADMIN — IOPAMIDOL 120 ML: 755 INJECTION, SOLUTION INTRAVENOUS at 23:01

## 2024-01-19 RX ADMIN — METHOCARBAMOL 750 MG: 500 TABLET ORAL at 08:07

## 2024-01-19 RX ADMIN — HYDROCHLOROTHIAZIDE 25 MG: 25 TABLET ORAL at 08:08

## 2024-01-19 RX ADMIN — Medication 10 ML: at 20:22

## 2024-01-19 RX ADMIN — FOLIC ACID 1 MG: 1 TABLET ORAL at 20:21

## 2024-01-19 RX ADMIN — OXYCODONE HYDROCHLORIDE 5 MG: 5 TABLET ORAL at 10:10

## 2024-01-19 RX ADMIN — FOLIC ACID 1 MG: 1 TABLET ORAL at 08:08

## 2024-01-19 RX ADMIN — PREGABALIN 150 MG: 75 CAPSULE ORAL at 08:08

## 2024-01-19 RX ADMIN — METHOCARBAMOL 750 MG: 500 TABLET ORAL at 20:21

## 2024-01-19 RX ADMIN — IPRATROPIUM BROMIDE AND ALBUTEROL SULFATE 1 DOSE: .5; 3 SOLUTION RESPIRATORY (INHALATION) at 12:32

## 2024-01-19 RX ADMIN — IPRATROPIUM BROMIDE AND ALBUTEROL SULFATE 1 DOSE: .5; 3 SOLUTION RESPIRATORY (INHALATION) at 08:40

## 2024-01-19 RX ADMIN — PRAVASTATIN SODIUM 20 MG: 20 TABLET ORAL at 20:21

## 2024-01-19 RX ADMIN — Medication 10 ML: at 08:17

## 2024-01-19 ASSESSMENT — PAIN SCALES - GENERAL
PAINLEVEL_OUTOF10: 7
PAINLEVEL_OUTOF10: 7
PAINLEVEL_OUTOF10: 8
PAINLEVEL_OUTOF10: 5
PAINLEVEL_OUTOF10: 8
PAINLEVEL_OUTOF10: 7
PAINLEVEL_OUTOF10: 10
PAINLEVEL_OUTOF10: 7
PAINLEVEL_OUTOF10: 4

## 2024-01-19 ASSESSMENT — PAIN DESCRIPTION - ORIENTATION: ORIENTATION: RIGHT

## 2024-01-19 ASSESSMENT — PAIN DESCRIPTION - LOCATION
LOCATION: CHEST

## 2024-01-19 NOTE — PROGRESS NOTES
[facility]    1/19/2024 7:21 AM        Name: Serge Austin .              Admitted: 1/18/2024  Primary Care Provider: Kj Julien MD (Tel: [phone])      Subjective:  .    Seen this am   Reports pain over right chest tube insertion site.  I added oral Oxy IR and lyrica.  IV narcotic also available.  He is unable to take NSAIDS due to previous ulcer      Sought care in ED due to shortness of breath,  COVID +  Had pigtail CT inserted for Right Pneumothorax     Reviewed interval ancillary notes    Current Medications  methocarbamol (ROBAXIN) tablet 750 mg, 4x Daily  lidocaine-EPINEPHrine 1 percent-1:445745 injection,   sodium chloride flush 0.9 % injection 5-40 mL, 2 times per day  sodium chloride flush 0.9 % injection 5-40 mL, PRN  0.9 % sodium chloride infusion, PRN  enoxaparin (LOVENOX) injection 40 mg, Daily  ondansetron (ZOFRAN-ODT) disintegrating tablet 4 mg, Q8H PRN   Or  ondansetron (ZOFRAN) injection 4 mg, Q6H PRN  polyethylene glycol (GLYCOLAX) packet 17 g, Daily PRN  acetaminophen (TYLENOL) tablet 650 mg, Q6H PRN   Or  acetaminophen (TYLENOL) suppository 650 mg, Q6H PRN  morphine (PF) injection 2 mg, Q2H PRN   Or  morphine injection 4 mg, Q2H PRN  hydroCHLOROthiazide (HYDRODIURIL) tablet 25 mg, Daily  pravastatin (PRAVACHOL) tablet 20 mg, Nightly  linaclotide (LINZESS) capsule 290 mcg, QAM AC  hydroxychloroquine (PLAQUENIL) tablet 200 mg, BID  folic acid (FOLVITE) tablet 1 mg, BID  amLODIPine (NORVASC) tablet 10 mg, Daily  ipratropium 0.5 mg-albuterol 2.5 mg (DUONEB) nebulizer solution 1 Dose, Q4H WA RT        Objective:  BP (!) 155/89   Pulse 69   Temp 98.4 °F (36.9 °C) (Oral)   Resp 18   Ht 1.854 m (6' 1\")   Wt 80.3 kg (177 lb)   SpO2 98%   BMI 23.35 kg/m²   No intake or output data in the 24 hours ending 01/19/24 0721   Wt Readings from Last 3 Encounters:   01/19/24 80.3 kg (177 lb)   04/10/23 89.6 kg (197 lb 9.6 oz)   02/25/20 92.1 kg (203 lb)        General appearance:  Appears comfortable, alert and pleasant   Eyes: Sclera clear. Pupils equal.  ENT: Moist oral mucosa. Trachea midline, no adenopathy.  Cardiovascular: Regular rhythm, normal S1, S2. No murmur. No edema in lower extremities  Respiratory: Not using accessory muscles. Good inspiratory effort.breath sounds present all lung fields. Right apical chest tube with scant serous drainage.  No leaks or crepidus noted    GI: Abdomen soft, no tenderness, not distended, normal bowel sounds  Musculoskeletal: No cyanosis in digits, neck supple  Neurology: CN 2-12 grossly intact. No speech or motor deficits  Psych: Normal affect. Alert and oriented in time, place and person  Skin: Warm, dry, normal turgor    Labs and Tests:  CBC:   Recent Labs     01/18/24 1938 01/19/24  0456   WBC 5.9 6.6   HGB 14.3 14.3    161     BMP:    Recent Labs     01/18/24 1938 01/19/24  0456    141   K 5.5* 3.7    103   CO2 25 28   BUN 19 17   CREATININE 1.1 0.9   GLUCOSE 119* 100*     Hepatic: No results for input(s): \"AST\", \"ALT\", \"ALB\", \"BILITOT\", \"ALKPHOS\" in the last 72 hours.    CRP 6.7    Chest xray:  MPRESSION:  Status post placement of a pigtail chest tube inferiorly on the right with a  resolving right-sided pneumothorax.     Slowly resolving bibasilar atelectasis or infiltrates     COPD with bullous lesions and scarring along the upper lobes which is  unchanged.       Problem List  Principal Problem:    Pneumothorax on right  Resolved Problems:    * No resolved hospital problems. *       Assessment & Plan:   Right Pneumothorax: S/P right chest tube placement with resolving Pneumothorax.  COPD:  he does not current appear to be in exacerbation, continue with current inhaled therapy  COVID:  on RA, continue with supportive care,  CRP 6.7  he is up to date with all immunizations   Hyperkalemia:  resolved  RA: on plaquenil and folic acid  Appreciate input from pulmonary       Diet: ADULT DIET; Regular; 5

## 2024-01-19 NOTE — ED PROVIDER NOTES
EMERGENCY MEDICINE ATTENDING NOTE  Luis Alberto Garrett Jr., FIONA APARICIO, MIGUEL        I personally saw the patient and made/approved the management plan and take responsibility for the patient management on Serge Austin.  All diagnostic, treatment, and disposition decisions were made by myself in conjunction with the advanced practice provider.  I have participated in the medical decision making and directed the treatment plan and disposition of the patient.    For further details of Serge Austin's emergency department encounter, please see the advanced practice provider's documentation.    I, Luis Alberto Garrett Jr., FIONA APARICIO, MIGUEL, am the primary physician provider of record.      CHIEF COMPLAINT  Chief Complaint   Patient presents with    Shortness of Breath     [facility] from home d/t SOB and chest pain that started tonight. Does have history of asthma, tried albutrol at home with no relief. EMS states initial RA O2 was 88%, EMS gave duoneb and albuterol en route. States he was around his grandson who has COVID        BRIEF HISTORY  Serge Austin is a 63 y.o. male  who presents to the ED for evaluation of shortness of breath.  Is been going on for last few days and getting worse.  Has history of asthma states his treatments are not helping.  He was noted to be hypoxic for EMS.  Is around his grandson who has COVID.    BRIEF/FOCUSED PHYSICAL EXAMINATION  VITAL SIGNS:    ED Triage Vitals [01/18/24 1859]   Enc Vitals Group      /76      Pulse 93      Respirations 19      Temp 97.3 °F (36.3 °C)      Temp Source Oral      SpO2 92 %      Weight - Scale 83.5 kg (184 lb)      Height       Head Circumference       Peak Flow       Pain Score       Pain Loc       Pain Edu?       Excl. in GC?      Physical Exam  Vitals and nursing note reviewed.   Constitutional:       General: He is in acute distress.      Appearance: He is not toxic-appearing.   HENT:      Head: Normocephalic and atraumatic.  reversal, interpretation assumes no reversalSinus rhythm with occasional Premature ventricular complexesRight atrial enlargementRightward axisPulmonary disease patternNon-specific intra-ventricular conduction delayST & T wave abnormality, consider inferior ischemiaProlonged QT         BRIEF ED COURSE/MDM  Old records reviewed. Labs and imaging reviewed.  Patient seen and evaluated by myself along with the NARCISO.  Patient does present for evaluation of shortness of breath.  Is been getting worse over the last few days.  Patient's imaging does reveal a 50% pneumothorax on the right side.  Denies any trauma.  There is no sign of tension on it.  After consenting the patient a pigtail catheter is placed with successful resolution of the pneumothorax.  He is also noted to be COVID-positive here.  He will require hospitalization for further evaluation of his pneumothorax.  In reviewing old imaging he does appear to have blebs on that side and is likely due to a ruptured bleb which may have been brought on by COVID.    Medications given:  Medications   lidocaine-EPINEPHrine 1 percent-1:288763 injection (has no administration in time range)   aspirin chewable tablet 324 mg (324 mg Oral Given 1/18/24 1916)   HYDROmorphone HCl PF (DILAUDID) injection 1 mg (1 mg IntraVENous Given 1/18/24 2050)   fentaNYL (SUBLIMAZE) injection 25 mcg (25 mcg IntraVENous Given 1/18/24 2137)        Discharge Medications:  New Prescriptions    No medications on file       PROCEDURES  Chest Tube    Date/Time: 1/18/2024 9:11 PM    Performed by: Luis Alberto Garrett DO  Authorized by: Luis Alberto Garrett DO    Consent:     Consent obtained:  Written    Consent given by:  Patient    Risks, benefits, and alternatives were discussed: yes      Risks discussed:  Bleeding, pain, damage to surrounding structures and infection  Universal protocol:     Procedure explained and questions answered to patient or proxy's satisfaction: yes      Relevant documents

## 2024-01-19 NOTE — CONSULTS
PULMONARY AND CRITICAL CARE MEDICINE CONSULTATION NOTE    CONSULTING PHYSICIAN:  Anastacio Angulo MD     ADMISSION DATE: 1/18/2024  ADMISSION DIAGNOSIS: Hyperkalemia [E87.5]  Pneumothorax on right [J93.9]  COVID-19 [U07.1]    REASON FOR CONSULT:   Chief Complaint   Patient presents with    Shortness of Breath     [facility] from home d/t SOB and chest pain that started tonight. Does have history of asthma, tried albutrol at home with no relief. EMS states initial RA O2 was 88%, EMS gave duoneb and albuterol en route. States he was around his grandson who has COVID        DATE OF CONSULT: 1/18/2024    HISTORY OF PRESENT ILLNESS: 63 y.o. year old male with a past medical history significant for COPD, diabetes, rheumatoid arthritis, hypertension, hyperlipidemia presented to the hospital with shortness of breath.  Patient reports that shortness of breath started about few hours before he came into the hospital.  He had a sharp right-sided chest pain after coughing spell.  He had been having coughing for last 2 to 3 days.  As soon as the pain started he felt very short of breath.  He denies any fevers, chills or night sweats.  In the ER patient underwent chest x-ray which showed right-sided pneumothorax.  An emergent chest tube was placed which almost completely resolved the pneumothorax.  He was also found to have COVID-19 infection.  Pulmonary was consulted for further management.    At the time of my evaluation patient is lying in bed in no apparent respiratory distress.  Currently on room air.  As per the patient pain at the site of the chest tube is more bothersome than the breathing itself.  No air leak seen through the chest tube.     REVIEW OF SYSTEMS:     CONSTITUTIONAL SYMPTOMS: The patient denies fever, fatigue, night sweats, weight loss or weight gain.   HEENT: No vision changes. No tinnitus, Denies sinus pain. No hoarseness, or dysphagia.   NECK: Patient denies swelling in the neck.  ROTATOR CUFF REPAIR Bilateral     SHOULDER ARTHROSCOPY Right     TOTAL KNEE ARTHROPLASTY Left 2020    INCISION AND DRAINAGE, CULTURE AND CLOSURE LEFT TOTAL KNEE ARTHROPLASTY WOUND,  POLY EXCHANGE performed by Robert Tavarez MD at [facility] OR       SOCIAL HISTORY:   Social History     Tobacco Use    Smoking status: Former     Current packs/day: 0.00     Types: Cigarettes     Start date: 1986     Quit date: 1991     Years since quittin.4    Smokeless tobacco: Never   Vaping Use    Vaping Use: Never used   Substance Use Topics    Alcohol use: Yes     Comment: rarely    Drug use: Yes     Frequency: 2.0 times per week     Types: Marijuana (Weed)     Comment: HAS MEDICAL MARIJUANA - gummies       FAMILY HISTORY:   Family History   Problem Relation Age of Onset    Arthritis Mother     Stroke Father     Diabetes Maternal Aunt     Diabetes Maternal Uncle     Diabetes Maternal Grandfather        MEDICATIONS:     No current facility-administered medications on file prior to encounter.     Current Outpatient Medications on File Prior to Encounter   Medication Sig Dispense Refill    pregabalin (LYRICA) 150 MG capsule Take 1 capsule by mouth in the morning and at bedtime. Max Daily Amount: 300 mg      empagliflozin (JARDIANCE) 10 MG tablet Take 1 tablet by mouth daily (Patient not taking: Reported on 2024)      folic acid (FOLVITE) 1 MG tablet Take 1 tablet by mouth in the morning and at bedtime      hydroCHLOROthiazide (HYDRODIURIL) 25 MG tablet Take 1 tablet by mouth daily      Albuterol Sulfate, sensor, (PROAIR DIGIHALER) 108 (90 Base) MCG/ACT AEPB Inhale 2 puffs into the lungs      amLODIPine (NORVASC) 10 MG tablet Take 1 tablet by mouth daily      hydroxychloroquine (PLAQUENIL) 200 MG tablet Take 1 tablet by mouth 2 times daily      valsartan (DIOVAN) 320 MG tablet Take 1 tablet by mouth daily (Patient not taking: Reported on 2024)      tadalafil (CIALIS) 20 MG tablet SMARTSIG:Tablet(s) By

## 2024-01-19 NOTE — ED NOTES
ED TO INPATIENT SBAR HANDOFF    Patient Name: Serge Austin   :  1960  63 y.o.   MRN:  [id number]  Preferred Name  Serge   ED Room #:  ED-0002/02  Family/Caregiver Present no   Restraints no   Sitter no   Sepsis Risk Score Sepsis Risk Score: 0.73    Situation  Code Status: Full Code No additional code details.    Allergies: Metformin, Shellfish-derived products, and Simvastatin  Weight: Patient Vitals for the past 96 hrs (Last 3 readings):   Weight   24 1859 83.5 kg (184 lb)     Arrived from: home  Chief Complaint:   Chief Complaint   Patient presents with    Shortness of Breath     [facility] from home d/t SOB and chest pain that started tonight. Does have history of asthma, tried albutrol at home with no relief. EMS states initial RA O2 was 88%, EMS gave duoneb and albuterol en route. States he was around his grandson who has COVID      Hospital Problem/Diagnosis:  Principal Problem:    Pneumothorax on right  Resolved Problems:    * No resolved hospital problems. *    Imaging:   XR CHEST PORTABLE   Final Result   Status post placement of a pigtail chest tube inferiorly on the right with a   resolving right-sided pneumothorax.      Slowly resolving bibasilar atelectasis or infiltrates      COPD with bullous lesions and scarring along the upper lobes which is   unchanged.         XR CHEST PORTABLE   Final Result   Interval development of a 40-50% pneumothorax on the right laterally and   inferiorly with no significant mediastinal shift.      Moderate atelectasis and consolidation along the right lung base medially.      COPD with bullous emphysematous changes and scarring along both upper lobes   which is unchanged      Mild cardiomegaly and mild central pulmonary congestion which is more   prominent.      The findings were called to Chelsea Perkins in the emergency room at 8:11   p.m.           Abnormal labs:   Abnormal Labs Reviewed   COVID-19 & INFLUENZA COMBO - Abnormal; Notable for the

## 2024-01-19 NOTE — ED PROVIDER NOTES
1/18/24 1916)             Is this patient to be included in the SEP-1 Core Measure due to severe sepsis or septic shock?   No   Exclusion criteria - the patient is NOT to be included for SEP-1 Core Measure due to:  2+ SIRS criteria are not met        Chronic Conditions affecting care:    has a past medical history of Acute renal failure (HCC) (09/28/2012), Arthritis, Asthma, COPD (chronic obstructive pulmonary disease) (HCC), Diabetes mellitus (HCC) (08/19/2011), GERD (gastroesophageal reflux disease), History of gastritis, Hyperlipidemia, Hypertension, Pancreatitis, peripheral neuropathy, and spinal stenosis.    CONSULTS: (Who and What was discussed)  None    Records Reviewed (External and Source) EMR reviewed    CC/HPI Summary, DDx, ED Course, and Reassessment: Briefly, this is a 63-year-old male who presents with the above-mentioned concerns.  Differential diagnosis includes asthma/COPD exacerbation, infection, PE, versus alternate pulmonary or cardiac etiology.  Examination as noted above.  Discussed cardiac workup as well as pulmonary workup including D-dimer given that the patient has a history of atrial fibrillation is not chronically anticoagulated.    Stable CBC and BMP although there is a mild hyperkalemia to 5.5.  D-dimer mildly elevated to 0.89.  Initial troponin negative.  VBG pH 7.49, pCO2 35.7, base excess 4.1, HCO3 27.3.    I received a call from the reading radiologist on CXR at 1811 hrs.  Critical result of right-sided pneumothorax communicated.  This was discussed directly with my supervising physician, Dr. Garrett.  The patient was moved to examination room 2 for placement of chest tube.  I did discuss this finding directly with the patient the patient's wife at bedside.  Discussed indication for chest tube placement.  Risk and benefits were reviewed.  Additionally discussed admission for observation for SpO2 monitoring as well as management of chest tube.  Patient is agreeable with  admission    Disposition Considerations (tests considered but not done, Admit vs D/C, Shared Decision Making, Pt Expectation of Test or Tx.): Admit    The patient tolerated their visit well.  The patient and / or the family were informed of the results of any tests, a time was given to answer questions, a plan was proposed and they agreed with plan.    I am the Primary Clinician of Record.  FINAL IMPRESSION      1. Pneumothorax on right    2. Hyperkalemia          DISPOSITION/PLAN     DISPOSITION Decision To Admit 01/18/2024 08:20:36 PM      PATIENT REFERRED TO:  No follow-up provider specified.    DISCHARGE MEDICATIONS:  New Prescriptions    No medications on file       DISCONTINUED MEDICATIONS:  Discontinued Medications    No medications on file              (Please note that portions of this note were completed with a voice recognition program.  Efforts were made to edit the dictations but occasionally words are mis-transcribed.)    EUSEBIO Jay CNP (electronically signed)      Chelsea Perkins APRN - CNP  01/18/24 2033

## 2024-01-19 NOTE — H&P
V2.0  History and Physical      Name:  Serge Austin /Age/Sex: 1960  (63 y.o. male)   MRN & CSN:  [id number] & [id number] Encounter Date/Time: 2024 10:07 PM EST   Location:  ED- PCP: Kj Julien MD       Hospital Day: 1    Assessment and Plan:   Serge Austin is a 63 y.o. male with a pmh of COPD, type 2 diabetes, rheumatoid arthritis who presents with shortness of breath and found to have right-sided pneumothorax and positive COVID infection.    Hospital Problems             Last Modified POA    * (Principal) Pneumothorax on right 2024 Yes   Right-sided pneumothorax  COVID-19 infection  Hyperkalemia  COPD  Rheumatoid arthritis  Hypertension  Spinal stenosis    Plan:  Right-sided pneumothorax: Patient is status post chest tube placement in the ER.  Continue chest tube to suction.  Consult pulmonology for further recommendations.    COVID-19 infection: Patient remains on room air, continue conservative management.    Hyperkalemia: Monitor for improvement.  Hold medications that can worsen potassium level.    COPD: Resume bronchodilators.    I have reviewed patient's home meds, will reconcile and resume as appropriate      Disposition:   Current Living situation: Home  Expected Disposition: Home, pending clinical outcomes/further workup  Estimated D/C: 72 hrs    Diet ADULT DIET; Regular; 5 carb choices (75 gm/meal)   DVT Prophylaxis [] Lovenox, []  Heparin, [] SCDs, [] Ambulation,  [] Eliquis, [] Xarelto, [] Coumadin   Code Status Full Code   Surrogate Decision Maker/ POA Unknown     Personally reviewed Lab Studies and Imaging       History from:     patient    History of Present Illness:     Chief Complaint: Shortness of breath  Serge Austin is a 63 y.o. male with pmh of COPD, rheumatoid arthritis, hypertension, spinal stenosis who presented to the ER complaining of shortness of breath which has been going on for several hours today.  He also reported mild sharp right-sided chest

## 2024-01-20 ENCOUNTER — APPOINTMENT (OUTPATIENT)
Dept: GENERAL RADIOLOGY | Age: 64
DRG: 199 | End: 2024-01-20
Payer: COMMERCIAL

## 2024-01-20 LAB
ANION GAP SERPL CALCULATED.3IONS-SCNC: 9 MMOL/L (ref 3–16)
BASOPHILS # BLD: 0.1 K/UL (ref 0–0.2)
BASOPHILS NFR BLD: 1 %
BUN SERPL-MCNC: 12 MG/DL (ref 7–20)
CALCIUM SERPL-MCNC: 9.2 MG/DL (ref 8.3–10.6)
CHLORIDE SERPL-SCNC: 101 MMOL/L (ref 99–110)
CO2 SERPL-SCNC: 28 MMOL/L (ref 21–32)
CREAT SERPL-MCNC: 0.8 MG/DL (ref 0.8–1.3)
DEPRECATED RDW RBC AUTO: 15.7 % (ref 12.4–15.4)
EKG ATRIAL RATE: 88 BPM
EKG DIAGNOSIS: NORMAL
EKG P AXIS: 100 DEGREES
EKG P-R INTERVAL: 140 MS
EKG Q-T INTERVAL: 388 MS
EKG QRS DURATION: 118 MS
EKG QTC CALCULATION (BAZETT): 469 MS
EKG R AXIS: 90 DEGREES
EKG T AXIS: -49 DEGREES
EKG VENTRICULAR RATE: 88 BPM
EOSINOPHIL # BLD: 0.1 K/UL (ref 0–0.6)
EOSINOPHIL NFR BLD: 1.7 %
GFR SERPLBLD CREATININE-BSD FMLA CKD-EPI: >60 ML/MIN/{1.73_M2}
GLUCOSE BLD-MCNC: 106 MG/DL (ref 70–99)
GLUCOSE BLD-MCNC: 84 MG/DL (ref 70–99)
GLUCOSE BLD-MCNC: 91 MG/DL (ref 70–99)
GLUCOSE BLD-MCNC: 99 MG/DL (ref 70–99)
GLUCOSE SERPL-MCNC: 96 MG/DL (ref 70–99)
HCT VFR BLD AUTO: 42.8 % (ref 40.5–52.5)
HGB BLD-MCNC: 14 G/DL (ref 13.5–17.5)
LEVETIRACETAM SERPL-MCNC: 19.1 UG/ML (ref 6–46)
LYMPHOCYTES # BLD: 1.3 K/UL (ref 1–5.1)
LYMPHOCYTES NFR BLD: 21 %
MCH RBC QN AUTO: 32.8 PG (ref 26–34)
MCHC RBC AUTO-ENTMCNC: 32.8 G/DL (ref 31–36)
MCV RBC AUTO: 100.2 FL (ref 80–100)
MEDICATION DOSE-MCNC: NORMAL
MONOCYTES # BLD: 0.6 K/UL (ref 0–1.3)
MONOCYTES NFR BLD: 10.3 %
NEUTROPHILS # BLD: 4 K/UL (ref 1.7–7.7)
NEUTROPHILS NFR BLD: 66 %
PERFORMED ON: ABNORMAL
PERFORMED ON: NORMAL
PLATELET # BLD AUTO: 174 K/UL (ref 135–450)
PMV BLD AUTO: 8.4 FL (ref 5–10.5)
POTASSIUM SERPL-SCNC: 4.8 MMOL/L (ref 3.5–5.1)
RBC # BLD AUTO: 4.28 M/UL (ref 4.2–5.9)
REASON FOR REJECTION: NORMAL
REJECTED TEST: NORMAL
SODIUM SERPL-SCNC: 138 MMOL/L (ref 136–145)
WBC # BLD AUTO: 6 K/UL (ref 4–11)

## 2024-01-20 PROCEDURE — 6360000002 HC RX W HCPCS: Performed by: FAMILY MEDICINE

## 2024-01-20 PROCEDURE — 2700000000 HC OXYGEN THERAPY PER DAY

## 2024-01-20 PROCEDURE — 6370000000 HC RX 637 (ALT 250 FOR IP): Performed by: NURSE PRACTITIONER

## 2024-01-20 PROCEDURE — 2580000003 HC RX 258: Performed by: FAMILY MEDICINE

## 2024-01-20 PROCEDURE — 80177 DRUG SCRN QUAN LEVETIRACETAM: CPT

## 2024-01-20 PROCEDURE — 93010 ELECTROCARDIOGRAM REPORT: CPT | Performed by: INTERNAL MEDICINE

## 2024-01-20 PROCEDURE — 94640 AIRWAY INHALATION TREATMENT: CPT

## 2024-01-20 PROCEDURE — 71045 X-RAY EXAM CHEST 1 VIEW: CPT

## 2024-01-20 PROCEDURE — 2580000003 HC RX 258: Performed by: INTERNAL MEDICINE

## 2024-01-20 PROCEDURE — 99232 SBSQ HOSP IP/OBS MODERATE 35: CPT | Performed by: INTERNAL MEDICINE

## 2024-01-20 PROCEDURE — 6370000000 HC RX 637 (ALT 250 FOR IP): Performed by: FAMILY MEDICINE

## 2024-01-20 PROCEDURE — 2060000000 HC ICU INTERMEDIATE R&B

## 2024-01-20 PROCEDURE — 94761 N-INVAS EAR/PLS OXIMETRY MLT: CPT

## 2024-01-20 PROCEDURE — 6360000002 HC RX W HCPCS: Performed by: NURSE PRACTITIONER

## 2024-01-20 PROCEDURE — 85025 COMPLETE CBC W/AUTO DIFF WBC: CPT

## 2024-01-20 PROCEDURE — 6360000002 HC RX W HCPCS: Performed by: INTERNAL MEDICINE

## 2024-01-20 PROCEDURE — 80048 BASIC METABOLIC PNL TOTAL CA: CPT

## 2024-01-20 PROCEDURE — 6370000000 HC RX 637 (ALT 250 FOR IP): Performed by: INTERNAL MEDICINE

## 2024-01-20 PROCEDURE — 36415 COLL VENOUS BLD VENIPUNCTURE: CPT

## 2024-01-20 RX ORDER — LORAZEPAM 2 MG/ML
INJECTION INTRAMUSCULAR
Status: DISPENSED
Start: 2024-01-20 | End: 2024-01-20

## 2024-01-20 RX ORDER — LEVETIRACETAM 500 MG/5ML
1000 INJECTION, SOLUTION, CONCENTRATE INTRAVENOUS EVERY 12 HOURS
Status: COMPLETED | OUTPATIENT
Start: 2024-01-20 | End: 2024-01-20

## 2024-01-20 RX ADMIN — SODIUM CHLORIDE, PRESERVATIVE FREE 2 MG: 5 INJECTION INTRAVENOUS at 00:10

## 2024-01-20 RX ADMIN — OXYCODONE HYDROCHLORIDE 5 MG: 5 TABLET ORAL at 11:57

## 2024-01-20 RX ADMIN — PRAVASTATIN SODIUM 20 MG: 20 TABLET ORAL at 21:18

## 2024-01-20 RX ADMIN — Medication 10 ML: at 21:18

## 2024-01-20 RX ADMIN — IPRATROPIUM BROMIDE AND ALBUTEROL SULFATE 1 DOSE: .5; 3 SOLUTION RESPIRATORY (INHALATION) at 21:18

## 2024-01-20 RX ADMIN — LEVETIRACETAM 1000 MG: 100 INJECTION, SOLUTION INTRAVENOUS at 00:20

## 2024-01-20 RX ADMIN — ENOXAPARIN SODIUM 40 MG: 100 INJECTION SUBCUTANEOUS at 07:46

## 2024-01-20 RX ADMIN — OXYCODONE HYDROCHLORIDE 5 MG: 5 TABLET ORAL at 16:52

## 2024-01-20 RX ADMIN — IPRATROPIUM BROMIDE AND ALBUTEROL SULFATE 1 DOSE: .5; 3 SOLUTION RESPIRATORY (INHALATION) at 09:35

## 2024-01-20 RX ADMIN — FOLIC ACID 1 MG: 1 TABLET ORAL at 07:47

## 2024-01-20 RX ADMIN — HYDROMORPHONE HYDROCHLORIDE 0.5 MG: 1 INJECTION, SOLUTION INTRAMUSCULAR; INTRAVENOUS; SUBCUTANEOUS at 21:22

## 2024-01-20 RX ADMIN — HYDROCHLOROTHIAZIDE 25 MG: 25 TABLET ORAL at 07:46

## 2024-01-20 RX ADMIN — METHOCARBAMOL 750 MG: 500 TABLET ORAL at 07:46

## 2024-01-20 RX ADMIN — HYDROXYCHLOROQUINE SULFATE 200 MG: 200 TABLET, FILM COATED ORAL at 07:47

## 2024-01-20 RX ADMIN — FOLIC ACID 1 MG: 1 TABLET ORAL at 21:17

## 2024-01-20 RX ADMIN — Medication 10 ML: at 07:47

## 2024-01-20 RX ADMIN — METHOCARBAMOL 750 MG: 500 TABLET ORAL at 11:57

## 2024-01-20 RX ADMIN — METHOCARBAMOL 750 MG: 500 TABLET ORAL at 21:17

## 2024-01-20 RX ADMIN — METHOCARBAMOL 750 MG: 500 TABLET ORAL at 16:52

## 2024-01-20 RX ADMIN — AMLODIPINE BESYLATE 10 MG: 5 TABLET ORAL at 07:46

## 2024-01-20 RX ADMIN — HYDROXYCHLOROQUINE SULFATE 200 MG: 200 TABLET, FILM COATED ORAL at 21:17

## 2024-01-20 RX ADMIN — OXYCODONE HYDROCHLORIDE 5 MG: 5 TABLET ORAL at 07:46

## 2024-01-20 RX ADMIN — PREGABALIN 150 MG: 75 CAPSULE ORAL at 21:17

## 2024-01-20 RX ADMIN — PREGABALIN 150 MG: 75 CAPSULE ORAL at 07:46

## 2024-01-20 ASSESSMENT — PAIN DESCRIPTION - ORIENTATION
ORIENTATION: RIGHT

## 2024-01-20 ASSESSMENT — PAIN DESCRIPTION - LOCATION
LOCATION: CHEST

## 2024-01-20 ASSESSMENT — PAIN SCALES - GENERAL
PAINLEVEL_OUTOF10: 9
PAINLEVEL_OUTOF10: 8
PAINLEVEL_OUTOF10: 8
PAINLEVEL_OUTOF10: 6
PAINLEVEL_OUTOF10: 10

## 2024-01-20 NOTE — RT PROTOCOL NOTE
RT Inhaler-Nebulizer Bronchodilator Protocol Note    There is a bronchodilator order in the chart from a provider indicating to follow the RT Bronchodilator Protocol and there is an “Initiate RT Inhaler-Nebulizer Bronchodilator Protocol” order as well (see protocol at bottom of note).    CXR Findings:  XR CHEST PORTABLE    Result Date: 1/19/2024  Similar appearance of small right-sided pneumothorax with 7-8 mm of pleural separation near the apex.     XR CHEST PORTABLE    Result Date: 1/18/2024  Status post placement of a pigtail chest tube inferiorly on the right with a resolving right-sided pneumothorax. Slowly resolving bibasilar atelectasis or infiltrates COPD with bullous lesions and scarring along the upper lobes which is unchanged.     XR CHEST PORTABLE    Result Date: 1/18/2024  Interval development of a 40-50% pneumothorax on the right laterally and inferiorly with no significant mediastinal shift. Moderate atelectasis and consolidation along the right lung base medially. COPD with bullous emphysematous changes and scarring along both upper lobes which is unchanged Mild cardiomegaly and mild central pulmonary congestion which is more prominent. The findings were called to Chelsea Perkins in the emergency room at 8:11 p.m.       The findings from the last RT Protocol Assessment were as follows:   History Pulmonary Disease: Chronic pulmonary disease  Respiratory Pattern: Regular pattern and RR 12-20 bpm  Breath Sounds: Slightly diminished and/or crackles  Cough: Strong, spontaneous, non-productive  Indication for Bronchodilator Therapy:    Bronchodilator Assessment Score: 4    Aerosolized bronchodilator medication orders have been revised according to the RT Inhaler-Nebulizer Bronchodilator Protocol below.    Respiratory Therapist to perform RT Therapy Protocol Assessment initially then follow the protocol.  Repeat RT Therapy Protocol Assessment PRN for score 0-3 or on second treatment, BID, and PRN for

## 2024-01-20 NOTE — PLAN OF CARE
Problem: Discharge Planning  Goal: Discharge to home or other facility with appropriate resources  1/20/2024 0845 by Brittny Lombardo RN  Outcome: Progressing     Problem: Pain  Goal: Verbalizes/displays adequate comfort level or baseline comfort level  1/20/2024 0845 by Brittny Lombardo RN  Outcome: Progressing     Problem: Skin/Tissue Integrity  Goal: Absence of new skin breakdown  Description: 1.  Monitor for areas of redness and/or skin breakdown  2.  Assess vascular access sites hourly  3.  Every 4-6 hours minimum:  Change oxygen saturation probe site  4.  Every 4-6 hours:  If on nasal continuous positive airway pressure, respiratory therapy assess nares and determine need for appliance change or resting period.  1/20/2024 0845 by Brittny Lombardo RN  Outcome: Progressing     Problem: Safety - Adult  Goal: Free from fall injury  1/20/2024 0845 by Brittny Lombardo, RN  Outcome: Progressing     Problem: ABCDS Injury Assessment  Goal: Absence of physical injury  1/20/2024 0845 by Brittny Lombardo, RN  Outcome: Progressing

## 2024-01-20 NOTE — PROGRESS NOTES
Rapid Response Quick Summary    Room: [facility]3365/3365-01    Assessment of concern / patient:  pt with sudden change in mental status. Not responding to commands or answering questions. Stroke alert called. Noted initially pt moving left side but not right side. Within about 10 minutes noted symptoms resolving. Pt able to follow commands and answer questions appropriately. While in CT noted O2 sats dropped, pt placed on O2 at 2 L/min    While waiting for CTA results it was noted pt having a tonic clonic type seizure. MD notified and pt given ativan and keppra.    Physician involved:  Dr Lam    Interventions:  blood sugar checked =147, labs ordered and collected, MD spoke with stroke team. Pt transported to CT for CT head and CTA head/neck. Also added CTA chest.    Disposition:  pt to remain at current level of care at this time.

## 2024-01-20 NOTE — PROGRESS NOTES
RR called at 2227 due to sudden change in mental status and rhythm changes. Pt BP was elevated at 186/86 and a heart rate of 134. Blood sugar was 147. Pt was extremely diaphoretic and responded only to sternal rub. He could not follow any commands. Due to muscle flaccidity and sudden change in mental status, stroke alert was called. Pt began improving within minutes and 12 minutes after stroke alert was called, pt could follow simple commands and was verbally responding to questions. Within 17 minutes, pt was answering questions correctly.     Pt taken to CT. While down in CT, RN was notified O2 was dropping into 70s-80s. Placed pt on 2L NC and went back up to the mid 90's. CT was completed and pt brought back up to floor.     While awaiting CT results, pt was seen on camera having a seizure- tonic clonic in nature. Nurses responded and pt was found to have same symptoms as when RR was called.     MD was notified and 2mg IV ativan was given as well as 1000mg of keppra IV. Pt is resting in bed with bed in lowest position, call light in reach, and on camera.     RN attempted to call emergency contact which is his spouse with questions regarding history, but could not reach spouse. Voicemail was left to call back hospital.     Awaiting CT results and further orders.

## 2024-01-20 NOTE — PROGRESS NOTES
Spoke with pt regarding the events of the evening to make him aware of his seizures and educated pt on use of padded side rails and camera in the room. Pt states that he just remembered that his last seizure was in April and that he doesn't know if he takes keppra.     Pt alert and oriented in his bed with siderails up and in seizure precations. His bed is in the lowest position with his call light in reach. Pt O2 remains stable on 2L of oxygen.    Spoke with emergency contact of patient (spouse) to discuss the events of last night. Pt wife states that she is unsure if he has been taking his keppra at home. Wife will clarify medication reconciliation and history when she visits  today. Per pt, last seizure was earlier this month; however wife states that last seizure was in April of 2023.

## 2024-01-20 NOTE — PROGRESS NOTES
[facility]    1/20/2024 8:10 AM        Name: Serge Austin .              Admitted: 1/18/2024  Primary Care Provider: Kj Julien MD (Tel: [phone])      Subjective:  .    Seen this am with wife at the bedside    Notes reviewed and indicate a period of altered mental state last evening.  Pt had tonic clonic seizure.  Ativan and keppra was given.    CT of head and CTA completed and did not show any acute findings    Pt feels ok other than pain at the CT insertion site   Reports that he has been taking his Keppra.  Office notes from neurology at [facility] reviewed.   All labs and scans reviewed in detail with pt and wife at bedside     Summary:  Sought care in ED due to shortness of breath,  COVID +  Had pigtail CT inserted for Right Pneumothorax     Reviewed interval ancillary notes    Current Medications  LORazepam (ATIVAN) 2 mg in sodium chloride (PF) 0.9 % 10 mL injection, Once  LORazepam (ATIVAN) 2 MG/ML injection,   methocarbamol (ROBAXIN) tablet 750 mg, 4x Daily  HYDROmorphone HCl PF (DILAUDID) injection 0.5 mg, Q4H PRN  oxyCODONE (ROXICODONE) immediate release tablet 5 mg, Q4H PRN  pregabalin (LYRICA) capsule 150 mg, BID  ipratropium 0.5 mg-albuterol 2.5 mg (DUONEB) nebulizer solution 1 Dose, BID RT  sodium chloride flush 0.9 % injection 5-40 mL, 2 times per day  sodium chloride flush 0.9 % injection 5-40 mL, PRN  0.9 % sodium chloride infusion, PRN  enoxaparin (LOVENOX) injection 40 mg, Daily  ondansetron (ZOFRAN-ODT) disintegrating tablet 4 mg, Q8H PRN   Or  ondansetron (ZOFRAN) injection 4 mg, Q6H PRN  polyethylene glycol (GLYCOLAX) packet 17 g, Daily PRN  acetaminophen (TYLENOL) tablet 650 mg, Q6H PRN   Or  acetaminophen (TYLENOL) suppository 650 mg, Q6H PRN  hydroCHLOROthiazide (HYDRODIURIL) tablet 25 mg, Daily  pravastatin (PRAVACHOL) tablet 20 mg, Nightly  linaclotide (LINZESS) capsule 290 mcg, QAM AC  hydroxychloroquine (PLAQUENIL) tablet 200 mg,  BID  folic acid (FOLVITE) tablet 1 mg, BID  amLODIPine (NORVASC) tablet 10 mg, Daily        Objective:  BP (!) 149/86   Pulse 68   Temp 97.3 °F (36.3 °C) (Oral)   Resp 20   Ht 1.854 m (6' 1\")   Wt 78.5 kg (173 lb)   SpO2 98%   BMI 22.82 kg/m²     Intake/Output Summary (Last 24 hours) at 1/20/2024 0810  Last data filed at 1/20/2024 0746  Gross per 24 hour   Intake 10 ml   Output 150 ml   Net -140 ml      Wt Readings from Last 3 Encounters:   01/20/24 78.5 kg (173 lb)   04/10/23 89.6 kg (197 lb 9.6 oz)   02/25/20 92.1 kg (203 lb)       General appearance:  Appears comfortable, alert and pleasant   Eyes: Sclera clear. Pupils equal.  ENT: Moist oral mucosa. Trachea midline, no adenopathy.  Cardiovascular: Regular rhythm, normal S1, S2. No murmur. No edema in lower extremities  Respiratory: Not using accessory muscles. Good inspiratory effort.breath sounds present all lung fields. Right apical chest tube with scant serous drainage.  No leaks or crepidus noted    GI: Abdomen soft, no tenderness, not distended, normal bowel sounds  Musculoskeletal: No cyanosis in digits, neck supple  Neurology: CN 2-12 grossly intact. No speech or motor deficits  Psych: Normal affect. Alert and oriented in time, place and person  Skin: Warm, dry, normal turgor    Labs and Tests:  CBC:   Recent Labs     01/18/24  1938 01/19/24  0456 01/19/24  2240   WBC 5.9 6.6 6.5   HGB 14.3 14.3 15.2    161 181       BMP:    Recent Labs     01/19/24  0456 01/19/24  2240 01/20/24  0551    138 138   K 3.7 3.8 4.8    96* 101   CO2 28 24 28   BUN 17 13 12   CREATININE 0.9 0.9 0.8   GLUCOSE 100* 141* 96       Hepatic: No results for input(s): \"AST\", \"ALT\", \"ALB\", \"BILITOT\", \"ALKPHOS\" in the last 72 hours.    CRP 6.7      IMPRESSION:  CTPA 1/20/2024   1.  No acute pulmonary arterial thromboembolic disease.     2.  Right anterior pleural catheter.  Trace right pleural air and fluid.     3.  No pulmonary mass or consolidation.  Upper

## 2024-01-21 ENCOUNTER — APPOINTMENT (OUTPATIENT)
Dept: GENERAL RADIOLOGY | Age: 64
DRG: 199 | End: 2024-01-21
Payer: COMMERCIAL

## 2024-01-21 LAB
ANION GAP SERPL CALCULATED.3IONS-SCNC: 9 MMOL/L (ref 3–16)
BASOPHILS # BLD: 0 K/UL (ref 0–0.2)
BASOPHILS NFR BLD: 0.7 %
BUN SERPL-MCNC: 17 MG/DL (ref 7–20)
CALCIUM SERPL-MCNC: 9.5 MG/DL (ref 8.3–10.6)
CHLORIDE SERPL-SCNC: 97 MMOL/L (ref 99–110)
CO2 SERPL-SCNC: 30 MMOL/L (ref 21–32)
CREAT SERPL-MCNC: 0.9 MG/DL (ref 0.8–1.3)
DEPRECATED RDW RBC AUTO: 15.2 % (ref 12.4–15.4)
EOSINOPHIL # BLD: 0.3 K/UL (ref 0–0.6)
EOSINOPHIL NFR BLD: 3.7 %
GFR SERPLBLD CREATININE-BSD FMLA CKD-EPI: >60 ML/MIN/{1.73_M2}
GLUCOSE BLD-MCNC: 100 MG/DL (ref 70–99)
GLUCOSE BLD-MCNC: 110 MG/DL (ref 70–99)
GLUCOSE BLD-MCNC: 99 MG/DL (ref 70–99)
GLUCOSE SERPL-MCNC: 92 MG/DL (ref 70–99)
HCT VFR BLD AUTO: 46.6 % (ref 40.5–52.5)
HGB BLD-MCNC: 15.5 G/DL (ref 13.5–17.5)
LYMPHOCYTES # BLD: 1.5 K/UL (ref 1–5.1)
LYMPHOCYTES NFR BLD: 21.1 %
MCH RBC QN AUTO: 33.5 PG (ref 26–34)
MCHC RBC AUTO-ENTMCNC: 33.3 G/DL (ref 31–36)
MCV RBC AUTO: 100.6 FL (ref 80–100)
MONOCYTES # BLD: 0.7 K/UL (ref 0–1.3)
MONOCYTES NFR BLD: 9.9 %
NEUTROPHILS # BLD: 4.6 K/UL (ref 1.7–7.7)
NEUTROPHILS NFR BLD: 64.6 %
PERFORMED ON: ABNORMAL
PERFORMED ON: ABNORMAL
PERFORMED ON: NORMAL
PLATELET # BLD AUTO: 201 K/UL (ref 135–450)
PMV BLD AUTO: 8.6 FL (ref 5–10.5)
POTASSIUM SERPL-SCNC: 4.2 MMOL/L (ref 3.5–5.1)
RBC # BLD AUTO: 4.63 M/UL (ref 4.2–5.9)
REASON FOR REJECTION: NORMAL
REJECTED TEST: NORMAL
SODIUM SERPL-SCNC: 136 MMOL/L (ref 136–145)
WBC # BLD AUTO: 7.1 K/UL (ref 4–11)

## 2024-01-21 PROCEDURE — 6370000000 HC RX 637 (ALT 250 FOR IP): Performed by: FAMILY MEDICINE

## 2024-01-21 PROCEDURE — 6370000000 HC RX 637 (ALT 250 FOR IP): Performed by: NURSE PRACTITIONER

## 2024-01-21 PROCEDURE — 36415 COLL VENOUS BLD VENIPUNCTURE: CPT

## 2024-01-21 PROCEDURE — 6370000000 HC RX 637 (ALT 250 FOR IP): Performed by: INTERNAL MEDICINE

## 2024-01-21 PROCEDURE — 2060000000 HC ICU INTERMEDIATE R&B

## 2024-01-21 PROCEDURE — 94640 AIRWAY INHALATION TREATMENT: CPT

## 2024-01-21 PROCEDURE — 99232 SBSQ HOSP IP/OBS MODERATE 35: CPT | Performed by: INTERNAL MEDICINE

## 2024-01-21 PROCEDURE — 2580000003 HC RX 258: Performed by: FAMILY MEDICINE

## 2024-01-21 PROCEDURE — 6360000002 HC RX W HCPCS: Performed by: NURSE PRACTITIONER

## 2024-01-21 PROCEDURE — 80048 BASIC METABOLIC PNL TOTAL CA: CPT

## 2024-01-21 PROCEDURE — 6360000002 HC RX W HCPCS: Performed by: FAMILY MEDICINE

## 2024-01-21 PROCEDURE — 71045 X-RAY EXAM CHEST 1 VIEW: CPT

## 2024-01-21 PROCEDURE — 85025 COMPLETE CBC W/AUTO DIFF WBC: CPT

## 2024-01-21 PROCEDURE — 94761 N-INVAS EAR/PLS OXIMETRY MLT: CPT

## 2024-01-21 RX ORDER — INSULIN LISPRO 100 [IU]/ML
0-4 INJECTION, SOLUTION INTRAVENOUS; SUBCUTANEOUS NIGHTLY
Status: DISCONTINUED | OUTPATIENT
Start: 2024-01-21 | End: 2024-01-22 | Stop reason: HOSPADM

## 2024-01-21 RX ORDER — GLUCAGON 1 MG/ML
1 KIT INJECTION PRN
Status: DISCONTINUED | OUTPATIENT
Start: 2024-01-21 | End: 2024-01-22 | Stop reason: HOSPADM

## 2024-01-21 RX ORDER — ALBUTEROL SULFATE 90 UG/1
2 AEROSOL, METERED RESPIRATORY (INHALATION)
Status: DISCONTINUED | OUTPATIENT
Start: 2024-01-21 | End: 2024-01-22 | Stop reason: HOSPADM

## 2024-01-21 RX ORDER — LEVETIRACETAM 500 MG/5ML
1000 INJECTION, SOLUTION, CONCENTRATE INTRAVENOUS EVERY 12 HOURS
Status: DISCONTINUED | OUTPATIENT
Start: 2024-01-21 | End: 2024-01-22 | Stop reason: HOSPADM

## 2024-01-21 RX ORDER — INSULIN LISPRO 100 [IU]/ML
0-4 INJECTION, SOLUTION INTRAVENOUS; SUBCUTANEOUS
Status: DISCONTINUED | OUTPATIENT
Start: 2024-01-21 | End: 2024-01-22 | Stop reason: HOSPADM

## 2024-01-21 RX ORDER — LEVETIRACETAM 750 MG/1
750 TABLET ORAL 2 TIMES DAILY
Status: ON HOLD | COMMUNITY
End: 2024-01-22

## 2024-01-21 RX ORDER — DEXTROSE MONOHYDRATE 100 MG/ML
INJECTION, SOLUTION INTRAVENOUS CONTINUOUS PRN
Status: DISCONTINUED | OUTPATIENT
Start: 2024-01-21 | End: 2024-01-22 | Stop reason: HOSPADM

## 2024-01-21 RX ADMIN — Medication 10 ML: at 20:37

## 2024-01-21 RX ADMIN — LEVETIRACETAM 1000 MG: 100 INJECTION, SOLUTION INTRAVENOUS at 20:36

## 2024-01-21 RX ADMIN — PREGABALIN 150 MG: 75 CAPSULE ORAL at 08:34

## 2024-01-21 RX ADMIN — ENOXAPARIN SODIUM 40 MG: 100 INJECTION SUBCUTANEOUS at 08:35

## 2024-01-21 RX ADMIN — Medication 2 PUFF: at 22:20

## 2024-01-21 RX ADMIN — HYDROCHLOROTHIAZIDE 25 MG: 25 TABLET ORAL at 08:34

## 2024-01-21 RX ADMIN — METHOCARBAMOL 750 MG: 500 TABLET ORAL at 20:37

## 2024-01-21 RX ADMIN — METHOCARBAMOL 750 MG: 500 TABLET ORAL at 08:34

## 2024-01-21 RX ADMIN — FOLIC ACID 1 MG: 1 TABLET ORAL at 08:35

## 2024-01-21 RX ADMIN — AMLODIPINE BESYLATE 10 MG: 5 TABLET ORAL at 08:34

## 2024-01-21 RX ADMIN — FOLIC ACID 1 MG: 1 TABLET ORAL at 20:37

## 2024-01-21 RX ADMIN — PRAVASTATIN SODIUM 20 MG: 20 TABLET ORAL at 20:37

## 2024-01-21 RX ADMIN — Medication 10 ML: at 08:47

## 2024-01-21 RX ADMIN — OXYCODONE HYDROCHLORIDE 5 MG: 5 TABLET ORAL at 02:15

## 2024-01-21 RX ADMIN — OXYCODONE HYDROCHLORIDE 5 MG: 5 TABLET ORAL at 12:25

## 2024-01-21 RX ADMIN — LINACLOTIDE 290 MCG: 145 CAPSULE, GELATIN COATED ORAL at 12:25

## 2024-01-21 RX ADMIN — IPRATROPIUM BROMIDE AND ALBUTEROL SULFATE 1 DOSE: .5; 3 SOLUTION RESPIRATORY (INHALATION) at 11:48

## 2024-01-21 RX ADMIN — PREGABALIN 150 MG: 75 CAPSULE ORAL at 20:37

## 2024-01-21 RX ADMIN — HYDROXYCHLOROQUINE SULFATE 200 MG: 200 TABLET, FILM COATED ORAL at 20:37

## 2024-01-21 RX ADMIN — HYDROMORPHONE HYDROCHLORIDE 0.5 MG: 1 INJECTION, SOLUTION INTRAMUSCULAR; INTRAVENOUS; SUBCUTANEOUS at 08:37

## 2024-01-21 RX ADMIN — OXYCODONE HYDROCHLORIDE 5 MG: 5 TABLET ORAL at 16:35

## 2024-01-21 RX ADMIN — METHOCARBAMOL 750 MG: 500 TABLET ORAL at 16:35

## 2024-01-21 RX ADMIN — Medication 2 PUFF: at 22:21

## 2024-01-21 RX ADMIN — METHOCARBAMOL 750 MG: 500 TABLET ORAL at 13:25

## 2024-01-21 RX ADMIN — HYDROXYCHLOROQUINE SULFATE 200 MG: 200 TABLET, FILM COATED ORAL at 08:34

## 2024-01-21 ASSESSMENT — PAIN SCALES - GENERAL
PAINLEVEL_OUTOF10: 4
PAINLEVEL_OUTOF10: 6
PAINLEVEL_OUTOF10: 0
PAINLEVEL_OUTOF10: 6
PAINLEVEL_OUTOF10: 8
PAINLEVEL_OUTOF10: 4

## 2024-01-21 ASSESSMENT — PAIN DESCRIPTION - ORIENTATION
ORIENTATION: RIGHT
ORIENTATION: RIGHT

## 2024-01-21 ASSESSMENT — PAIN DESCRIPTION - LOCATION
LOCATION: BACK
LOCATION: CHEST
LOCATION: CHEST
LOCATION: BACK

## 2024-01-21 NOTE — PROGRESS NOTES
Assessment complete.   Vitals:    01/20/24 1945   BP: 134/83   Pulse: 68   Resp: 18   Temp: 98.9 °F (37.2 °C)   SpO2: 94%   No SOB or any distress noted. Chest tube C/D/I. Pain med given for pain at 8/10 to arms ans shoulder and right chest. Night medications. POC discussed and agreed upon. Call light within reach, pt encouraged to call if any needs arise. No further requests at this time. Will continue to monitor.

## 2024-01-21 NOTE — PROGRESS NOTES
[facility] Pulmonary/CCM Progress note      Admit Date: 1/18/2024    Chief Complaint: Shortness of breath    Subjective:     Interval History: Complains of right-sided chest pain particularly with deep inspiration.  Tender at the site of chest tube.  No airleak noted.    Scheduled Meds:   LORazepam  2 mg IntraVENous Once    methocarbamol  750 mg Oral 4x Daily    pregabalin  150 mg Oral BID    ipratropium 0.5 mg-albuterol 2.5 mg  1 Dose Inhalation BID RT    sodium chloride flush  5-40 mL IntraVENous 2 times per day    enoxaparin  40 mg SubCUTAneous Daily    hydroCHLOROthiazide  25 mg Oral Daily    pravastatin  20 mg Oral Nightly    linaclotide  290 mcg Oral QAM AC    hydroxychloroquine  200 mg Oral BID    folic acid  1 mg Oral BID    amLODIPine  10 mg Oral Daily     Continuous Infusions:   sodium chloride       PRN Meds:HYDROmorphone, oxyCODONE, sodium chloride flush, sodium chloride, ondansetron **OR** ondansetron, polyethylene glycol, acetaminophen **OR** acetaminophen    Review of Systems  Constitutional: Fatigue and malaise  Ears, nose, mouth, throat: negative for ear drainage, epistaxis, hoarseness, nasal congestion, sore throat and voice change  Respiratory: negative except for pleurisy/chest pain and shortness of breath  Cardiovascular: negative for chest pain, chest pressure/discomfort, irregular heart beat, lower extremity edema and palpitations  Gastrointestinal: negative for abdominal pain, constipation, diarrhea, jaundice, melena, odynophagia, reflux symptoms and vomiting  Hematologic/lymphatic: negative for bleeding, easy bruising, lymphadenopathy and petechiae  Musculoskeletal:negative for arthralgias, bone pain, muscle weakness, neck pain and stiff joints  Neurological: negative for dizziness, gait problems, headaches, seizures, speech problems, tremors and weakness  Behavioral/Psych: negative for anxiety, behavior problems, depression, fatigue and sleep disturbance  Endocrine: negative for diabetic symptoms  including none, neuropathy, polyphagia, polyuria, polydipsia, vomiting and diarrhea and temperature intolerance  Allergic/Immunologic: negative for anaphylaxis, angioedema, hay fever and urticaria    Objective:     Patient Vitals for the past 8 hrs:   BP Temp Temp src Pulse Resp SpO2   01/20/24 1722 -- -- -- -- 18 --   01/20/24 1651 136/84 98.9 °F (37.2 °C) Oral 82 20 93 %   01/20/24 1500 -- -- -- 75 -- --   01/20/24 1315 -- -- -- 79 -- 93 %   01/20/24 1227 -- -- -- -- 16 --   01/20/24 1159 -- -- -- 69 -- --   01/20/24 1145 131/82 98.4 °F (36.9 °C) Oral 73 -- 91 %     I/O last 3 completed shifts:  In: 250 [P.O.:240; I.V.:10]  Out: 825 [Urine:525; Chest Tube:300]  No intake/output data recorded.    General Appearance: alert and oriented to person, place and time, well developed and well- nourished, in no acute distress  Skin: warm and dry, no rash or erythema  Head: normocephalic and atraumatic  Eyes: pupils equal, round, and reactive to light, extraocular eye movements intact, conjunctivae normal  ENT: external ear and ear canal normal bilaterally, nose without deformity, nasal mucosa and turbinates normal  Neck: supple and non-tender without mass, no cervical lymphadenopathy  Pulmonary/Chest: clear to auscultation bilaterally- no wheezes, rales or rhonchi, normal air movement, no respiratory distress  Cardiovascular: normal rate, regular rhythm,  no murmurs, rubs, distal pulses intact, no carotid bruits  Abdomen: soft, non-tender, non-distended, normal bowel sounds, no masses or organomegaly  Lymph Nodes: Cervical, supraclavicular normal  Extremities: no cyanosis, clubbing or edema  Musculoskeletal: normal range of motion, no joint swelling, deformity or tenderness  Neurologic: alert, no focal neurologic deficits    Data Review:  CBC:   Lab Results   Component Value Date/Time    WBC 6.0 01/20/2024 08:59 AM    RBC 4.28 01/20/2024 08:59 AM     BMP:   Lab Results   Component Value Date/Time    GLUCOSE 96 01/20/2024

## 2024-01-21 NOTE — PROGRESS NOTES
[facility]    1/21/2024 7:59 AM        Name: Serge Austin .              Admitted: 1/18/2024  Primary Care Provider: Kj Julien MD (Tel: [phone])      Subjective:  .    Seen this am with no family at bedside  Having less pain at CT insertion site  CT has been to water seal.    Chest xray reviewed   In good spirits today       On Saturaday morning he had a period of altered mental state  with tonic clonic seizure.  Ativan and keppra was given.    CT of head and CTA completed and did not show any acute findings        Summary:  Sought care in ED due to shortness of breath,  COVID +  Had pigtail CT inserted for Right Pneumothorax     Reviewed interval ancillary notes    Current Medications  LORazepam (ATIVAN) 2 mg in sodium chloride (PF) 0.9 % 10 mL injection, Once  methocarbamol (ROBAXIN) tablet 750 mg, 4x Daily  HYDROmorphone HCl PF (DILAUDID) injection 0.5 mg, Q4H PRN  oxyCODONE (ROXICODONE) immediate release tablet 5 mg, Q4H PRN  pregabalin (LYRICA) capsule 150 mg, BID  ipratropium 0.5 mg-albuterol 2.5 mg (DUONEB) nebulizer solution 1 Dose, BID RT  sodium chloride flush 0.9 % injection 5-40 mL, 2 times per day  sodium chloride flush 0.9 % injection 5-40 mL, PRN  0.9 % sodium chloride infusion, PRN  enoxaparin (LOVENOX) injection 40 mg, Daily  ondansetron (ZOFRAN-ODT) disintegrating tablet 4 mg, Q8H PRN   Or  ondansetron (ZOFRAN) injection 4 mg, Q6H PRN  polyethylene glycol (GLYCOLAX) packet 17 g, Daily PRN  acetaminophen (TYLENOL) tablet 650 mg, Q6H PRN   Or  acetaminophen (TYLENOL) suppository 650 mg, Q6H PRN  hydroCHLOROthiazide (HYDRODIURIL) tablet 25 mg, Daily  pravastatin (PRAVACHOL) tablet 20 mg, Nightly  linaclotide (LINZESS) capsule 290 mcg, QAM AC  hydroxychloroquine (PLAQUENIL) tablet 200 mg, BID  folic acid (FOLVITE) tablet 1 mg, BID  amLODIPine (NORVASC) tablet 10 mg, Daily        Objective:  BP (!) 141/84   Pulse 76   Temp 98.4 °F (36.9 °C) (Oral)

## 2024-01-21 NOTE — PROGRESS NOTES
[facility] Pulmonary/CCM Progress note      Admit Date: 1/18/2024    Chief Complaint: Shortness of breath    Subjective:     Interval History: On and off right-sided chest pain with deep inspiration.  Patient had active air leak with cough today.  Tiny pneumothorax on chest x-ray.    Scheduled Meds:   insulin lispro  0-4 Units SubCUTAneous TID WC    insulin lispro  0-4 Units SubCUTAneous Nightly    LORazepam  2 mg IntraVENous Once    methocarbamol  750 mg Oral 4x Daily    pregabalin  150 mg Oral BID    ipratropium 0.5 mg-albuterol 2.5 mg  1 Dose Inhalation BID RT    sodium chloride flush  5-40 mL IntraVENous 2 times per day    enoxaparin  40 mg SubCUTAneous Daily    hydroCHLOROthiazide  25 mg Oral Daily    pravastatin  20 mg Oral Nightly    linaclotide  290 mcg Oral QAM AC    hydroxychloroquine  200 mg Oral BID    folic acid  1 mg Oral BID    amLODIPine  10 mg Oral Daily     Continuous Infusions:   dextrose      sodium chloride       PRN Meds:dextrose bolus **OR** dextrose bolus, glucagon (rDNA), dextrose, HYDROmorphone, oxyCODONE, sodium chloride flush, sodium chloride, ondansetron **OR** ondansetron, polyethylene glycol, acetaminophen **OR** acetaminophen    Review of Systems  Constitutional: Fatigue and malaise  Ears, nose, mouth, throat: negative for ear drainage, epistaxis, hoarseness, nasal congestion, sore throat and voice change  Respiratory: negative except for pleurisy/chest pain and shortness of breath  Cardiovascular: negative for chest pain, chest pressure/discomfort, irregular heart beat, lower extremity edema and palpitations  Gastrointestinal: negative for abdominal pain, constipation, diarrhea, jaundice, melena, odynophagia, reflux symptoms and vomiting  Hematologic/lymphatic: negative for bleeding, easy bruising, lymphadenopathy and petechiae  Musculoskeletal:negative for arthralgias, bone pain, muscle weakness, neck pain and stiff joints  Neurological: negative for dizziness, gait problems, headaches,

## 2024-01-22 ENCOUNTER — APPOINTMENT (OUTPATIENT)
Dept: PHYSICAL THERAPY | Age: 64
End: 2024-01-22
Payer: COMMERCIAL

## 2024-01-22 ENCOUNTER — APPOINTMENT (OUTPATIENT)
Dept: GENERAL RADIOLOGY | Age: 64
DRG: 199 | End: 2024-01-22
Payer: COMMERCIAL

## 2024-01-22 VITALS
BODY MASS INDEX: 23.88 KG/M2 | WEIGHT: 180.2 LBS | TEMPERATURE: 98.3 F | OXYGEN SATURATION: 92 % | HEIGHT: 73 IN | DIASTOLIC BLOOD PRESSURE: 90 MMHG | RESPIRATION RATE: 19 BRPM | HEART RATE: 68 BPM | SYSTOLIC BLOOD PRESSURE: 138 MMHG

## 2024-01-22 PROBLEM — U07.1 COVID-19: Status: ACTIVE | Noted: 2024-01-22

## 2024-01-22 PROBLEM — G40.909 SEIZURE DISORDER (HCC): Status: ACTIVE | Noted: 2024-01-22

## 2024-01-22 LAB
GLUCOSE BLD-MCNC: 111 MG/DL (ref 70–99)
GLUCOSE BLD-MCNC: 113 MG/DL (ref 70–99)
GLUCOSE BLD-MCNC: 86 MG/DL (ref 70–99)
GLUCOSE BLD-MCNC: 87 MG/DL (ref 70–99)
PERFORMED ON: ABNORMAL
PERFORMED ON: ABNORMAL
PERFORMED ON: NORMAL
PERFORMED ON: NORMAL

## 2024-01-22 PROCEDURE — 71045 X-RAY EXAM CHEST 1 VIEW: CPT

## 2024-01-22 PROCEDURE — 94640 AIRWAY INHALATION TREATMENT: CPT

## 2024-01-22 PROCEDURE — APPSS60 APP SPLIT SHARED TIME 46-60 MINUTES

## 2024-01-22 PROCEDURE — 6360000002 HC RX W HCPCS: Performed by: NURSE PRACTITIONER

## 2024-01-22 PROCEDURE — APPNB30 APP NON BILLABLE TIME 0-30 MINS

## 2024-01-22 PROCEDURE — 94761 N-INVAS EAR/PLS OXIMETRY MLT: CPT

## 2024-01-22 PROCEDURE — 6370000000 HC RX 637 (ALT 250 FOR IP): Performed by: FAMILY MEDICINE

## 2024-01-22 PROCEDURE — 99222 1ST HOSP IP/OBS MODERATE 55: CPT | Performed by: PSYCHIATRY & NEUROLOGY

## 2024-01-22 PROCEDURE — 2580000003 HC RX 258: Performed by: FAMILY MEDICINE

## 2024-01-22 PROCEDURE — 6360000002 HC RX W HCPCS: Performed by: FAMILY MEDICINE

## 2024-01-22 PROCEDURE — 6370000000 HC RX 637 (ALT 250 FOR IP): Performed by: NURSE PRACTITIONER

## 2024-01-22 PROCEDURE — 99232 SBSQ HOSP IP/OBS MODERATE 35: CPT | Performed by: INTERNAL MEDICINE

## 2024-01-22 RX ORDER — LEVETIRACETAM 1000 MG/1
1000 TABLET ORAL 2 TIMES DAILY
Qty: 60 TABLET | Refills: 0 | Status: SHIPPED | OUTPATIENT
Start: 2024-01-22 | End: 2024-02-21

## 2024-01-22 RX ADMIN — LEVETIRACETAM 1000 MG: 100 INJECTION, SOLUTION INTRAVENOUS at 09:00

## 2024-01-22 RX ADMIN — METHOCARBAMOL 750 MG: 500 TABLET ORAL at 08:59

## 2024-01-22 RX ADMIN — Medication 2 PUFF: at 07:54

## 2024-01-22 RX ADMIN — HYDROXYCHLOROQUINE SULFATE 200 MG: 200 TABLET, FILM COATED ORAL at 09:00

## 2024-01-22 RX ADMIN — OXYCODONE HYDROCHLORIDE 5 MG: 5 TABLET ORAL at 00:06

## 2024-01-22 RX ADMIN — METHOCARBAMOL 750 MG: 500 TABLET ORAL at 17:50

## 2024-01-22 RX ADMIN — FOLIC ACID 1 MG: 1 TABLET ORAL at 09:00

## 2024-01-22 RX ADMIN — AMLODIPINE BESYLATE 10 MG: 5 TABLET ORAL at 09:00

## 2024-01-22 RX ADMIN — PREGABALIN 150 MG: 75 CAPSULE ORAL at 08:59

## 2024-01-22 RX ADMIN — METHOCARBAMOL 750 MG: 500 TABLET ORAL at 14:51

## 2024-01-22 RX ADMIN — OXYCODONE HYDROCHLORIDE 5 MG: 5 TABLET ORAL at 09:04

## 2024-01-22 RX ADMIN — Medication 10 ML: at 09:01

## 2024-01-22 RX ADMIN — ENOXAPARIN SODIUM 40 MG: 100 INJECTION SUBCUTANEOUS at 09:01

## 2024-01-22 RX ADMIN — HYDROCHLOROTHIAZIDE 25 MG: 25 TABLET ORAL at 08:59

## 2024-01-22 RX ADMIN — LINACLOTIDE 290 MCG: 145 CAPSULE, GELATIN COATED ORAL at 09:00

## 2024-01-22 RX ADMIN — HYDROMORPHONE HYDROCHLORIDE 0.5 MG: 1 INJECTION, SOLUTION INTRAMUSCULAR; INTRAVENOUS; SUBCUTANEOUS at 04:21

## 2024-01-22 RX ADMIN — HYDROMORPHONE HYDROCHLORIDE 0.5 MG: 1 INJECTION, SOLUTION INTRAMUSCULAR; INTRAVENOUS; SUBCUTANEOUS at 17:51

## 2024-01-22 ASSESSMENT — PAIN DESCRIPTION - LOCATION
LOCATION: BACK
LOCATION: BACK
LOCATION: BREAST

## 2024-01-22 ASSESSMENT — PAIN SCALES - GENERAL
PAINLEVEL_OUTOF10: 4
PAINLEVEL_OUTOF10: 6
PAINLEVEL_OUTOF10: 7
PAINLEVEL_OUTOF10: 6

## 2024-01-22 ASSESSMENT — PAIN DESCRIPTION - ORIENTATION
ORIENTATION: LOWER
ORIENTATION: MID;LOWER
ORIENTATION: RIGHT

## 2024-01-22 ASSESSMENT — PAIN DESCRIPTION - DESCRIPTORS
DESCRIPTORS: ACHING
DESCRIPTORS: ACHING;BURNING
DESCRIPTORS: ACHING

## 2024-01-22 NOTE — PROGRESS NOTES
[facility] Pulmonary/CCM Progress note      Admit Date: 1/18/2024    Chief Complaint: Shortness of breath    Subjective:     Interval History: Still has some right-sided chest pain, but no significant cough or shortness of breath.  Currently on room air.  Persistent air leak particularly with cough noted from chest tube-currently on -20 suction.    Scheduled Meds:   insulin lispro  0-4 Units SubCUTAneous TID WC    insulin lispro  0-4 Units SubCUTAneous Nightly    albuterol sulfate HFA  2 puff Inhalation BID RT    ipratropium  2 puff Inhalation BID RT    levETIRAcetam  1,000 mg IntraVENous Q12H    LORazepam  2 mg IntraVENous Once    methocarbamol  750 mg Oral 4x Daily    pregabalin  150 mg Oral BID    sodium chloride flush  5-40 mL IntraVENous 2 times per day    enoxaparin  40 mg SubCUTAneous Daily    hydroCHLOROthiazide  25 mg Oral Daily    pravastatin  20 mg Oral Nightly    linaclotide  290 mcg Oral QAM AC    hydroxychloroquine  200 mg Oral BID    folic acid  1 mg Oral BID    amLODIPine  10 mg Oral Daily     Continuous Infusions:   dextrose      sodium chloride       PRN Meds:dextrose bolus **OR** dextrose bolus, glucagon (rDNA), dextrose, HYDROmorphone, oxyCODONE, sodium chloride flush, sodium chloride, ondansetron **OR** ondansetron, polyethylene glycol, acetaminophen **OR** acetaminophen    Review of Systems  Constitutional: Fatigue and malaise  Ears, nose, mouth, throat: negative for ear drainage, epistaxis, hoarseness, nasal congestion, sore throat and voice change  Respiratory: negative except for pleurisy/chest pain and shortness of breath  Cardiovascular: negative for chest pain, chest pressure/discomfort, irregular heart beat, lower extremity edema and palpitations  Gastrointestinal: negative for abdominal pain, constipation, diarrhea, jaundice, melena, odynophagia, reflux symptoms and vomiting  Hematologic/lymphatic: negative for bleeding, easy bruising, lymphadenopathy and petechiae  Musculoskeletal:negative

## 2024-01-22 NOTE — PROGRESS NOTES
Messaged Amparo Smith, NP regarding Keppra medication. Pt has not been receiving keppra since 2 seizures 1/19. Order was placed for IVP of 1000mg of Keppra. Day team to follow up on scheduled doses.     Messaged provider regarding Keppra level redraw for AM and she asked that we defer the lab to day team.

## 2024-01-22 NOTE — CONSULTS
In patient Neurology consult        [facility] Neurology      MD Serge Moreau   1960    Date of Service: 1/22/2024    Referring Physician: Lauri Marroquin MD      Reason for the consult and CC: Acute encephalopathy and breakthrough seizure.    HPI:   The patient is a 63 y.o.  years old male with past medical history of hypertension, hyperlipidemia, seizure disorder, and other medical problems comes to the hospital last week with shortness of breath.  Patient was found to have right-sided pneumothorax and was positive for COVID infection.  During hospitalization, patient noted to have altered mental status and was found to be lethargic.  Code stroke was called CT head showed no acute intracranial abnormality, CTA head and neck showed no flow-limiting stenosis or LVO.  After patient returned from CT suite, patient had witnessed tonic-clonic seizure on video monitoring.  Degree severe duration minutes.  Description generalized motor seizure.  Patient was given Ativan and bolused with 1000 mg of Keppra.  Neurology was consulted.      Today patient reports she is back to his baseline.  Patient reports he follows with [facility] neurology epilepsy clinic.  He reports his Keppra dose is 750 mg twice daily.  Apparently Keppra was not reconciled on admission and patient had missed a few doses.  Patient reports the first seizure was April 2023.  Patient had MRI and EEG done in June 2023 after second witnessed seizure.  Today he denies headache, dizziness, dysarthria or dysphagia.  Other review of systems unremarkable       Constitutional:   Vitals:    01/22/24 0036 01/22/24 0415 01/22/24 0421 01/22/24 0754   BP:  122/74     Pulse:  70  68   Resp: 18 18  16   Temp:  98.3 °F (36.8 °C)     TempSrc:  Oral     SpO2:  93%  93%   Weight:   81.7 kg (180 lb 3.2 oz)    Height:   1.854 m (6' 1\")          I personally reviewed and updated social history, past medical history, medications, allergy, surgical  of seizure generalized tonic-clonic seizure without any warning.  Previous EEG and MRI report reviewed.  He was diagnosed with COVID-19.  Today feels back to his baseline.  Is currently on Keppra which was increased up to once other milligram twice daily.  He missed few days of Keppra few days ago.  He denies today any headache or neck pain or fever or chills.    On examination:  No acute distress  Awake and alert x3.  Fluent speech.  Appears appropriate with intact recent and remote memory.  Pupil reactive and symmetric, extraocular motor intact, no ophthalmoplegia, face is symmetric and tongue is midline  No focal weakness with symmetric DTR  Normal tone  No sensory disturbance or abnormal movement    Impression:  Breakthrough seizure in a patient with history of seizure disorder likely secondary to COVID-19, infection, missing his Keppra for the last few days  COVID-19        Recommendation:  Continue Keppra once 1000 mg twice daily and follow creatinine with Keppra level with his neurologist  Seizure precautions discussed  Discussed side effect of Keppra including risk of suicidal ideation  No driving for 3 months and he needs to be cleared from his physician  Given nonfocal exam today, no need to repeat imaging or EEG  Droplet isolation  Respiratory support  Can be discharged from neurology once medically stable  No further recommendation      Electronically signed by Good Lo MD on 1/22/24 at 3:18 PM EST         This dictation was generated by voice recognition computer software. Although all attempts are made to edit the dictation for accuracy, there may be errors in the  transcription that are not intended

## 2024-01-22 NOTE — PROGRESS NOTES
[facility]    1/22/2024 8:09 AM        Name: Serge Austin .              Admitted: 1/18/2024  Primary Care Provider: Kj Julien MD (Tel: [phone])      Subjective:  .    Seen this am with RN at bedside  CT has been to suction.  Chest xray is unchanged    Pulmonary has spoke to CT surgery at .  Pt will need to be transferred and is agreeable       On Saturaday morning he had a period of altered mental state  with tonic clonic seizure.  Ativan and keppra was given.    CT of head and CTA completed and did not show any acute findings        Summary:  Sought care in ED due to shortness of breath,  COVID +  Had pigtail CT inserted for Right Pneumothorax     Reviewed interval ancillary notes    Current Medications  dextrose bolus 10% 125 mL, PRN   Or  dextrose bolus 10% 250 mL, PRN  glucagon injection 1 mg, PRN  dextrose 10 % infusion, Continuous PRN  insulin lispro (HUMALOG) injection vial 0-4 Units, TID WC  insulin lispro (HUMALOG) injection vial 0-4 Units, Nightly  albuterol sulfate HFA (PROVENTIL;VENTOLIN;PROAIR) 108 (90 Base) MCG/ACT inhaler 2 puff, BID RT  ipratropium (ATROVENT HFA) 17 MCG/ACT inhaler 2 puff, BID RT  levETIRAcetam (KEPPRA) injection 1,000 mg, Q12H  LORazepam (ATIVAN) 2 mg in sodium chloride (PF) 0.9 % 10 mL injection, Once  methocarbamol (ROBAXIN) tablet 750 mg, 4x Daily  HYDROmorphone HCl PF (DILAUDID) injection 0.5 mg, Q4H PRN  oxyCODONE (ROXICODONE) immediate release tablet 5 mg, Q4H PRN  pregabalin (LYRICA) capsule 150 mg, BID  sodium chloride flush 0.9 % injection 5-40 mL, 2 times per day  sodium chloride flush 0.9 % injection 5-40 mL, PRN  0.9 % sodium chloride infusion, PRN  enoxaparin (LOVENOX) injection 40 mg, Daily  ondansetron (ZOFRAN-ODT) disintegrating tablet 4 mg, Q8H PRN   Or  ondansetron (ZOFRAN) injection 4 mg, Q6H PRN  polyethylene glycol (GLYCOLAX) packet 17 g, Daily PRN  acetaminophen (TYLENOL) tablet 650 mg, Q6H PRN

## 2024-01-22 NOTE — PROGRESS NOTES
Consult to cardiothoracic surgery received for \"persistent pneumothorax, s/p chest tube. Persistent air leak and severe bullous emphysema. Evaluation for bullectomy\". Thoracic coverage not available at [facility] at this time. Notified referring provider and suggested transfer to [facility] if thoracic surgery is needed. Discussed with Dr. Chávez.    Electronically signed by EUSEBIO Isbell CNP on 1/22/2024 at 11:01 AM

## 2024-01-23 NOTE — DISCHARGE SUMMARY
xray:  MPRESSION:  Status post placement of a pigtail chest tube inferiorly on the right with a  resolving right-sided pneumothorax.     Slowly resolving bibasilar atelectasis or infiltrates     COPD with bullous lesions and scarring along the upper lobes which is  unchanged.     Invasive procedures and treatments.   None     Problem-based Hospital Course.  The patient has known bullous emphysema.  He had coughing episode at home and developed right sided chest pain.  He sought care in the ED and was noted to have right pneumothorax.  He had a pigtail chest tube inserted in the ED, Was admitted and followed by Pulmonary.     He also tested positive for COVID but had low CRP and did not require any supplemental oxygen.      He had active leak with coughing and was transferred to [facility] for possible bullectomy.    During his stay he had a tonic clonic seizure and his home dose of keppra was increased to 1000 mg bid. He was evaluated by neurology.  Imaging was negative.  He does have outpatient follow up with neurology at [facility]     RA :  stable on home therapy     Consults.  IP CONSULT TO PULMONOLOGY  IP CONSULT TO NEUROLOGY  IP CONSULT TO CARDIOTHORACIC SURGERY    Physical examination on discharge day.   BP (!) 138/90   Pulse 68   Temp 98.3 °F (36.8 °C) (Oral)   Resp 19   Ht 1.854 m (6' 1\")   Wt 81.7 kg (180 lb 3.2 oz)   SpO2 92%   BMI 23.77 kg/m²   General appearance.  Alert. Looks comfortable.  HEENT. Sclera clear. Moist mucus membranes.  Cardiovascular. Regular rate and rhythm, normal S1, S2. No murmur.   Respiratory. Not using accessory muscles.Clear to auscultation bilaterally, no wheeze.right CT to - 20 cm suction ,scant serous drainage.  No crepidus ,  small air leak with coughing only   Gastrointestinal. Abdomen soft, non-tender, not distended, normal bowel sounds  Neurology. Facial symmetry. No speech deficits. Moving all extremities equally.  Extremities. No edema in lower extremities.  Skin. Warm,  8:41 PM

## 2024-01-24 ENCOUNTER — APPOINTMENT (OUTPATIENT)
Dept: PHYSICAL THERAPY | Age: 64
End: 2024-01-24
Payer: COMMERCIAL

## 2024-01-29 ENCOUNTER — HOSPITAL ENCOUNTER (OUTPATIENT)
Dept: PHYSICAL THERAPY | Age: 64
Setting detail: THERAPIES SERIES
End: 2024-01-29
Payer: COMMERCIAL

## 2024-01-31 ENCOUNTER — HOSPITAL ENCOUNTER (OUTPATIENT)
Dept: PHYSICAL THERAPY | Age: 64
Setting detail: THERAPIES SERIES
End: 2024-01-31
Payer: COMMERCIAL

## 2024-02-03 NOTE — ADT AUTH CERT
6.7  he is up to date with all immunizations   4.         Hyperkalemia:  resolved  5.         RA: on plaquenil and folic acid  6.         Appreciate input from pulmonary         Per Pulm  IMPRESSION:    1.         Acute respiratory distress  2.         COVID-19 infection  3.         Secondary spontaneous right pneumothorax  4.         Bilateral emphysema  5.         Rheumatoid arthritis  6.         Previous history of tobacco abuse     RECOMMENDATION:    1.         Patient patient came into the hospital with acute respiratory distress and found to be having a large right-sided secondary spontaneous pneumothorax.  2.         Most likely patient had COVID-related bronchitis which led to a coughing spell which eventually led to right-sided pneumothorax.  3.         He had an emergent right-sided chest tube placed.  Which showed almost complete resolution of the pneumothorax.  4.         I placed the patient on waterseal for about an hour after which a chest x-ray was repeated.  5.         Repeat chest x-ray again shows that there was a small persistent right-sided pneumothorax present.  6.         At this time I will continue with chest tube drainage.  Increase the suction to -30 cm H2O.  7.         Repeat chest x-ray in the morning.  If pneumothorax is resolved or unchanged, can plan to remove the chest tube.  8.         Pain control as per primary team.  9.         Does have a mild COVID-19 infection.  10.       Patient is on room air.  No indications for steroids.  11.       On chronic Plaquenil.  12.       Continue with DuoNeb nebulization as before.        MEDICATIONS:  DILAUDID) injection 1 mg x1      methocarbamol, 750 mg, Oral, 4x Daily  pregabalin, 150 mg, Oral, BID  ipratropium 0.5 mg-albuterol 2.5 mg, 1 Dose, Inhalation,Q4 WA  enoxaparin, 40 mg, SubCUTAneous, Daily  hydroCHLOROthiazide, 25 mg, Oral, Daily  pravastatin, 20 mg, Oral, Nightly  linaclotide, 290 mcg, Oral, QAM AC- refused 6   hydroxychloroquine,

## 2024-02-22 ENCOUNTER — HOSPITAL ENCOUNTER (OUTPATIENT)
Dept: PHYSICAL THERAPY | Age: 64
Setting detail: THERAPIES SERIES
Discharge: HOME | End: 2024-02-22
Payer: COMMERCIAL

## 2024-02-22 PROCEDURE — 97164 PT RE-EVAL EST PLAN CARE: CPT

## 2024-02-22 PROCEDURE — 97110 THERAPEUTIC EXERCISES: CPT

## 2024-02-22 NOTE — FLOWSHEET NOTE
aquatic sessions prior to this incident. Pt with good response to aquatic therapy as seen by progression of exercises throughout plan of care and self reported progress. Plan to continue with aquatic therapy before transitioning to land-based therapy. Contacted reffering physicans office about clearance for pool activities due to recent seizure.    Medical Necessity Documentation:  I certify that this patient meets the below criteria necessary for medical necessity for care and/or justification of therapy services:  The patient has a musculoskeletal condition(s) with a corresponding ICD-10 code that is of complexity and severity that require skilled therapeutic intervention. This has a direct and significant impact on the need for therapy and significantly impacts the rate of recovery.   The patient has a complexity identified by an ICD-10 code that has a direct and significant impact on the need for therapy.  (Significantly impacts the rate of recovery and is associated with a primary condition.)   The patient has generalized musculoskeletal conditions or a condition affecting multiple sites that will have a direct impact on the rate of recovery  The patient has associated co-morbidities along with primary diagnosis which significantly impact the rate of recovery and contribute to complexities that require skilled therapeutic intervention  The patient had a prior episode of outpatient therapy during this calendar year for a different condition.  Current diagnosis requires skilled therapeutic intervention.    Treatment/Activity Tolerance:  [x] Patient tolerated treatment well [] Patient limited by fatique  [] Patient limited by pain  [] Patient limited by other medical complications  [] Other:     Return to Play: NA    Prognosis for POC: [x] Good [] Fair  [] Poor    Patient requires continued skilled intervention: [x] Yes  [] No      GOALS     Patient stated goal: decrease pain to allow for social

## 2024-02-26 ENCOUNTER — HOSPITAL ENCOUNTER (OUTPATIENT)
Dept: PHYSICAL THERAPY | Age: 64
Setting detail: THERAPIES SERIES
Discharge: HOME | End: 2024-02-26
Payer: COMMERCIAL

## 2024-02-26 PROCEDURE — 97113 AQUATIC THERAPY/EXERCISES: CPT

## 2024-02-26 NOTE — FLOWSHEET NOTE
functional mobility, ADLs and prior level of function   Status: [x] Progressing: [] Met: [] Not Met: [] Adjusted  3. New goal: Patient will be able to lift a 15# crate from ground to waist height 5x to demonstrate improved functional strength.                               Status: [x] Progressing: [] Met: [] Not Met: [] Adjusted  4. New goal: Patient will improve 5xSTS to 15 seconds or less to demonstrate decreased fall risk.    Status: [x] Progressing: [] Met: [] Not Met: [] Adjusted  New goal: patient will perform a floor transfer with independently and minimal cues in 5 minutes or less in order recover from a potential fall at home.                                                                     Status: [x] Progressing: [] Met: [] Not Met: [] Adjusted       Overall Progression Towards Functional goals/ Treatment Progress Update:  [] Patient is progressing as expected towards functional goals listed.    [] Progression is slowed due to complexities/Impairments listed.  [x] Progression has been slowed due to co-morbidities.  [] Plan just implemented, too soon (<30days) to assess goals progression   [] Goals require adjustment due to lack of progress  [] Patient is not progressing as expected and requires additional follow up with physician  [] Other:     CHARGE CAPTURE     PT CHARGE GRID   CPT Code (TIMED) minutes # CPT Code (UNTIMED) #     Therex (70781)     EVAL:HIGH (17431 - Typically 45 minutes face-to-face)     Neuromusc. Re-ed (46500)    Re-Eval (42381)     Manual (10665)    Estim Unattended (41445)     Ther. Act (85113)    Mech. Traction (41078)     Gait (47157)    Dry Needle 1-2 muscle (20560)     Aquatic Therex (16939) 62 4  Dry Needle 3+ muscle (20561)     Iontophoresis (48119)    VASO (77305)     Ultrasound (95232)    Group Therapy (00380)     Estim Attended (05163)    Canalith Repositioning (98995)     Other:    Other:    Total Timed Code Tx Minutes 62 4       Total Treatment Minutes 62        Charge

## 2024-02-27 ENCOUNTER — OFFICE VISIT (OUTPATIENT)
Dept: PULMONOLOGY | Age: 64
End: 2024-02-27
Payer: COMMERCIAL

## 2024-02-27 VITALS
SYSTOLIC BLOOD PRESSURE: 110 MMHG | BODY MASS INDEX: 24.78 KG/M2 | DIASTOLIC BLOOD PRESSURE: 70 MMHG | HEART RATE: 65 BPM | OXYGEN SATURATION: 95 % | HEIGHT: 73 IN | WEIGHT: 187 LBS

## 2024-02-27 DIAGNOSIS — J44.9 COPD, SEVERITY TO BE DETERMINED (HCC): Primary | ICD-10-CM

## 2024-02-27 DIAGNOSIS — J43.9 BULLOUS EMPHYSEMA (HCC): ICD-10-CM

## 2024-02-27 DIAGNOSIS — Z87.09 HX OF PNEUMOTHORAX: ICD-10-CM

## 2024-02-27 PROCEDURE — G8427 DOCREV CUR MEDS BY ELIG CLIN: HCPCS | Performed by: INTERNAL MEDICINE

## 2024-02-27 PROCEDURE — 3074F SYST BP LT 130 MM HG: CPT | Performed by: INTERNAL MEDICINE

## 2024-02-27 PROCEDURE — 3017F COLORECTAL CA SCREEN DOC REV: CPT | Performed by: INTERNAL MEDICINE

## 2024-02-27 PROCEDURE — G8484 FLU IMMUNIZE NO ADMIN: HCPCS | Performed by: INTERNAL MEDICINE

## 2024-02-27 PROCEDURE — 99214 OFFICE O/P EST MOD 30 MIN: CPT | Performed by: INTERNAL MEDICINE

## 2024-02-27 PROCEDURE — G8420 CALC BMI NORM PARAMETERS: HCPCS | Performed by: INTERNAL MEDICINE

## 2024-02-27 PROCEDURE — 3023F SPIROM DOC REV: CPT | Performed by: INTERNAL MEDICINE

## 2024-02-27 PROCEDURE — 3078F DIAST BP <80 MM HG: CPT | Performed by: INTERNAL MEDICINE

## 2024-02-27 PROCEDURE — 1036F TOBACCO NON-USER: CPT | Performed by: INTERNAL MEDICINE

## 2024-02-27 ASSESSMENT — ENCOUNTER SYMPTOMS
RHINORRHEA: 0
STRIDOR: 0
VOMITING: 0
BACK PAIN: 0
COLOR CHANGE: 0
DIARRHEA: 0
CHOKING: 0
COUGH: 0
WHEEZING: 0
SINUS PRESSURE: 0
VOICE CHANGE: 0
APNEA: 0
ABDOMINAL DISTENTION: 0
ABDOMINAL PAIN: 0
CONSTIPATION: 0
SORE THROAT: 0
SHORTNESS OF BREATH: 1
BLOOD IN STOOL: 0
CHEST TIGHTNESS: 0

## 2024-02-27 NOTE — PROGRESS NOTES
vaccine  Aged Out    Hib vaccine  Aged Out    Polio vaccine  Aged Out    Meningococcal (ACWY) vaccine  Aged Out        Assessment/Plan:     Diagnosis Orders   1. COPD, severity to be determined (HCC)  Full PFT Study With Bronchodilator      2. Hx of pneumothorax  CT CHEST HIGH RESOLUTION      3. Bullous emphysema (HCC)  CT CHEST HIGH RESOLUTION           Bullous emphysema, particular at both apices.  Status post VATS/mechanical pleurodesis at  by Dr. Lisa on 1/23.  Postoperative course has been uneventful, patient is almost close to his baseline.    Patient is currently treated with Symbicort 160, 2 puffs twice daily and albuterol inhaler as needed which we will continue.  Obtain complete PFT study to determine the degree of obstructive airway disease.    We will also obtain high-resolution CT scan of the chest to evaluate the current status of bullous airway disease.  I briefly spoke with Dr. Lisa, who only recommends monitoring of bullous lung disease on the left side.  No indication for surgery.    Former smoker, quit 20 years ago-1 pack every 2 days x 20 years.  Will benefit from annual low-dose CT imaging for lung nodule screening.    Return in about 1 month (around 3/27/2024).

## 2024-03-01 ENCOUNTER — HOSPITAL ENCOUNTER (OUTPATIENT)
Dept: PHYSICAL THERAPY | Age: 64
Setting detail: THERAPIES SERIES
Discharge: HOME | End: 2024-03-01
Payer: COMMERCIAL

## 2024-03-01 PROCEDURE — 97113 AQUATIC THERAPY/EXERCISES: CPT

## 2024-03-01 NOTE — FLOWSHEET NOTE
or increase flexibility, following either an injury or surgery      TREATMENT PLAN   Plan: Cont POC- Continue emphasis/focus on exercise progression, promoting relaxation, increasing ROM, reduce/eliminate soft tissue swelling/inflammation/restriction, allowing for proper ROM, static and dynamic balance, and kinesthetic sense and proprioception. Next visit plan to progress weights, progress reps, add new exercises, and progress balance  resume aquatic sessions.     Electronically Signed by Dea Girard, PTA [id number]      Date: 03/01/2024       Note: If patient does not return for scheduled/recommended follow up visits, this note will serve as a discharge from care along with the most recent update on progress.

## 2024-03-04 ENCOUNTER — HOSPITAL ENCOUNTER (OUTPATIENT)
Dept: PHYSICAL THERAPY | Age: 64
Setting detail: THERAPIES SERIES
Discharge: HOME | End: 2024-03-04
Payer: COMMERCIAL

## 2024-03-04 PROCEDURE — 97113 AQUATIC THERAPY/EXERCISES: CPT

## 2024-03-04 PROCEDURE — 97150 GROUP THERAPEUTIC PROCEDURES: CPT

## 2024-03-04 NOTE — FLOWSHEET NOTE
[facility] - Outpatient Rehabilitation and Therapy [address], [place] [address] office: [phone] fax: [phone]        Physical Therapy: TREATMENT/PROGRESS NOTE   Patient: Serge Austin (63 y.o. male)   Treatment Date: 2024   :  1960 MRN: [id number]   Visit #:   Insurance Allowable Auth Needed   As of : Auth not required for PT []Yes    [x]No    Insurance: Payor: BCBS / Plan: BCBS - [place] PPO / Product Type: *No Product type* /   Insurance ID: [id number] - ([facility])  Secondary Insurance (if applicable): MEDICARE   Treatment Diagnosis:     ICD-10-CM    1. Decreased range of motion  M25.60       2. Gait abnormality  R26.9       3. Pain  R52       4. Stiffness due to immobility  M25.60     Z74.09       5. Weakness  R53.1       6. Balance disorder  R26.89          Medical Diagnosis:    M79.18 (ICD-10-CM) - Myalgia, other site   M96.1 (ICD-10-CM) - Postlaminectomy syndrome, not elsewhere classified  M54.2 (ICD-10-CM) - Cervicalgia  G89.29 (ICD-10-CM) - other chronic pain       Referring Physician: Dameon Ortiz  PCP: Kj Julien MD                             Plan of care signed (Y/N): sent 24    Date of Patient follow up with Physician: ?     Progress Report/POC: NO  1/3/23: POC  POC update due: (10 visits /OR AUTH LIMITS, whichever is less) visit 18 or  3/22/2024     Precautions/ Contra-indications:                                                                                          Latex allergy:  NO  Pacemaker:    NO  Contraindications for Manipulation: recent surgical history (relative), unhealthy/ multiple comorbidities , and remote history of spinal fusion (relative)  Date of Surgery: multiples  Other: Pt had a seizure mid 2024. had a seizure in 2023; per neurologist: no activity restrictions - PT recommending close eye on pt while in pool, though pt has a pool at home and feels comfortable in water    Preferred Language

## 2024-03-05 ENCOUNTER — HOSPITAL ENCOUNTER (OUTPATIENT)
Dept: PULMONOLOGY | Age: 64
Discharge: HOME | End: 2024-03-05
Payer: COMMERCIAL

## 2024-03-05 VITALS — RESPIRATION RATE: 16 BRPM | HEART RATE: 66 BPM | OXYGEN SATURATION: 99 %

## 2024-03-05 DIAGNOSIS — J44.9 COPD, SEVERITY TO BE DETERMINED (HCC): ICD-10-CM

## 2024-03-05 LAB
DLCO %PRED: 63 %
DLCO PRED: NORMAL
DLCO/VA %PRED: NORMAL
DLCO/VA PRED: NORMAL
DLCO/VA: NORMAL
DLCO: NORMAL
EXPIRATORY TIME-POST: NORMAL
EXPIRATORY TIME: NORMAL
FEF 25-75 %CHNG: NORMAL
FEF 25-75 POST %PRED: NORMAL
FEF 25-75% %PRED-PRE: NORMAL
FEF 25-75% PRED: NORMAL
FEF 25-75-POST: NORMAL
FEF 25-75-PRE: NORMAL
FEV1 %PRED-POST: 47 %
FEV1 %PRED-PRE: 48 %
FEV1 PRED: NORMAL
FEV1-POST: NORMAL
FEV1-PRE: NORMAL
FEV1/FVC %PRED-POST: NORMAL
FEV1/FVC %PRED-PRE: NORMAL
FEV1/FVC PRED: NORMAL
FEV1/FVC-POST: 75 %
FEV1/FVC-PRE: 79 %
FVC %PRED-POST: NORMAL
FVC %PRED-PRE: NORMAL
FVC PRED: NORMAL
FVC-POST: NORMAL
FVC-PRE: NORMAL
GAW %PRED: NORMAL
GAW PRED: NORMAL
GAW: NORMAL
IC PRE %PRED: NORMAL
IC PRED: NORMAL
IC: NORMAL
MEP: NORMAL
MIP: NORMAL
MVV %PRED-PRE: NORMAL
MVV PRED: NORMAL
MVV-PRE: NORMAL
PEF %PRED-POST: NORMAL
PEF %PRED-PRE: NORMAL
PEF PRED: NORMAL
PEF%CHNG: NORMAL
PEF-POST: NORMAL
PEF-PRE: NORMAL
RAW %PRED: NORMAL
RAW PRED: NORMAL
RAW: NORMAL
RV PRE %PRED: NORMAL
RV PRED: NORMAL
RV: NORMAL
SVC %PRED: NORMAL
SVC PRED: NORMAL
SVC: NORMAL
TLC PRE %PRED: 81 %
TLC PRED: NORMAL
TLC: NORMAL
VA %PRED: NORMAL
VA PRED: NORMAL
VA: NORMAL
VTG %PRED: NORMAL
VTG PRED: NORMAL
VTG: NORMAL

## 2024-03-05 PROCEDURE — 6370000000 HC RX 637 (ALT 250 FOR IP): Performed by: INTERNAL MEDICINE

## 2024-03-05 PROCEDURE — 94760 N-INVAS EAR/PLS OXIMETRY 1: CPT

## 2024-03-05 PROCEDURE — 94060 EVALUATION OF WHEEZING: CPT

## 2024-03-05 PROCEDURE — 94726 PLETHYSMOGRAPHY LUNG VOLUMES: CPT

## 2024-03-05 PROCEDURE — 94729 DIFFUSING CAPACITY: CPT

## 2024-03-05 RX ORDER — ALBUTEROL SULFATE 90 UG/1
4 AEROSOL, METERED RESPIRATORY (INHALATION) ONCE
Status: COMPLETED | OUTPATIENT
Start: 2024-03-05 | End: 2024-03-05

## 2024-03-05 RX ADMIN — Medication 4 PUFF: at 08:34

## 2024-03-05 ASSESSMENT — PULMONARY FUNCTION TESTS
FEV1_PERCENT_PREDICTED_PRE: 48
FEV1/FVC_POST: 75
FEV1/FVC_PRE: 79
FEV1_PERCENT_PREDICTED_POST: 47

## 2024-03-07 NOTE — PROCEDURES
Pulmonary Function Testing      Patient name:  Serge Austin      Unit #:   [id number]   Date of test:  3/5/2024   Date of interpretation:   3/7/2024    Mr. Serge Austin is a 63 y.o. year-old non smoker. The spirometry data were acceptable and reproducible.     Spirometry:  Flow volume loops were obstructed. The FEV-1/FVC ratio was decreased. The pre-bronchodilator FEV-1 was 1.64 liters (48% of predicted), which was severely decreased. The FVC was 2.66 liters (61% of predicted), which was decreased. Response to inhaled bronchodilators (albuterol) was not significant.    Lung volumes:  Lung volumes were tested by plethysmography. The total lung capacity was 5.94 liters (81% of predicted), which was normal. The residual volume was 2.76 liters (104% of predicted), which was normal. The ratio of residual volume to total lung capacity (RV/TLC) was 46, which was increased.     Diffusion capacity was found to be 63% predicted which is Mildly decreased.      Interpretation:  Severe obstructive airway disease with no significant bronchodilator reversibility.    Comments:

## 2024-03-08 ENCOUNTER — HOSPITAL ENCOUNTER (OUTPATIENT)
Dept: PHYSICAL THERAPY | Age: 64
Setting detail: THERAPIES SERIES
Discharge: HOME | End: 2024-03-08
Payer: COMMERCIAL

## 2024-03-08 PROCEDURE — 97150 GROUP THERAPEUTIC PROCEDURES: CPT

## 2024-03-08 PROCEDURE — 97113 AQUATIC THERAPY/EXERCISES: CPT

## 2024-03-08 NOTE — FLOWSHEET NOTE
[facility] - Outpatient Rehabilitation and Therapy [address], [place] [address] office: [phone] fax: [phone]        Physical Therapy: TREATMENT/PROGRESS NOTE   Patient: Serge Austin (63 y.o. male)   Treatment Date: 2024   :  1960 MRN: [id number]   Visit #:   Insurance Allowable Auth Needed   As of : Auth not required for PT []Yes    [x]No    Insurance: Payor: BCBS / Plan: BCBS [place] PPO / Product Type: *No Product type* /   Insurance ID: [id number] - ([facility])  Secondary Insurance (if applicable): MEDICARE   Treatment Diagnosis:     ICD-10-CM    1. Decreased range of motion  M25.60       2. Gait abnormality  R26.9       3. Pain  R52       4. Stiffness due to immobility  M25.60     Z74.09       5. Weakness  R53.1       6. Balance disorder  R26.89          Medical Diagnosis:    M79.18 (ICD-10-CM) - Myalgia, other site   M96.1 (ICD-10-CM) - Postlaminectomy syndrome, not elsewhere classified  M54.2 (ICD-10-CM) - Cervicalgia  G89.29 (ICD-10-CM) - other chronic pain       Referring Physician: Dameon Ortiz  PCP: Kj Julien MD                             Plan of care signed (Y/N): sent 24    Date of Patient follow up with Physician: ?     Progress Report/POC: NO  1/3/23: POC  POC update due: (10 visits /OR AUTH LIMITS, whichever is less) visit 18 or  3/22/2024     Precautions/ Contra-indications:                                                                                          Latex allergy:  NO  Pacemaker:    NO  Contraindications for Manipulation: recent surgical history (relative), unhealthy/ multiple comorbidities , and remote history of spinal fusion (relative)  Date of Surgery: multiples  Other: Pt had a seizure mid 2024. had a seizure in 2023; per neurologist: no activity restrictions - PT recommending close eye on pt while in pool, though pt has a pool at home and feels comfortable in water    Preferred Language

## 2024-03-11 ENCOUNTER — HOSPITAL ENCOUNTER (OUTPATIENT)
Dept: PHYSICAL THERAPY | Age: 64
Setting detail: THERAPIES SERIES
Discharge: HOME | End: 2024-03-11
Payer: COMMERCIAL

## 2024-03-11 PROCEDURE — 97113 AQUATIC THERAPY/EXERCISES: CPT

## 2024-03-11 NOTE — FLOWSHEET NOTE
[facility] - Outpatient Rehabilitation and Therapy [address], [place] [address] office: [phone] fax: [phone]        Physical Therapy: TREATMENT/PROGRESS NOTE   Patient: Serge Austin (63 y.o. male)   Treatment Date: 2024   :  1960 MRN: [id number]   Visit #: 15 / 21  Insurance Allowable Auth Needed   As of : Auth not required for PT []Yes    [x]No    Insurance: Payor: BCBS / Plan: BCBS - [place] PPO / Product Type: *No Product type* /   Insurance ID: [id number] - ([facility])  Secondary Insurance (if applicable): MEDICARE   Treatment Diagnosis:     ICD-10-CM    1. Decreased range of motion  M25.60       2. Gait abnormality  R26.9       3. Pain  R52       4. Stiffness due to immobility  M25.60     Z74.09       5. Weakness  R53.1       6. Balance disorder  R26.89          Medical Diagnosis:    M79.18 (ICD-10-CM) - Myalgia, other site   M96.1 (ICD-10-CM) - Postlaminectomy syndrome, not elsewhere classified  M54.2 (ICD-10-CM) - Cervicalgia  G89.29 (ICD-10-CM) - other chronic pain       Referring Physician: Dameon Ortiz  PCP: Kj Julien MD                             Plan of care signed (Y/N): sent 24    Date of Patient follow up with Physician: ?     Progress Report/POC: NO  1/3/23: POC  POC update due: (10 visits /OR AUTH LIMITS, whichever is less) visit 18 or  3/22/2024     Precautions/ Contra-indications:                                                                                          Latex allergy:  NO  Pacemaker:    NO  Contraindications for Manipulation: recent surgical history (relative), unhealthy/ multiple comorbidities , and remote history of spinal fusion (relative)  Date of Surgery: multiples  Other: Pt had a seizure mid 2024. had a seizure in 2023; per neurologist: no activity restrictions - PT recommending close eye on pt while in pool, though pt has a pool at home and feels comfortable in water    Preferred Language

## 2024-03-15 ENCOUNTER — HOSPITAL ENCOUNTER (OUTPATIENT)
Dept: PHYSICAL THERAPY | Age: 64
Setting detail: THERAPIES SERIES
Discharge: HOME | End: 2024-03-15
Payer: COMMERCIAL

## 2024-03-15 PROCEDURE — 97150 GROUP THERAPEUTIC PROCEDURES: CPT

## 2024-03-15 PROCEDURE — 97113 AQUATIC THERAPY/EXERCISES: CPT

## 2024-03-15 NOTE — FLOWSHEET NOTE
[facility] - Outpatient Rehabilitation and Therapy [address], [place] [address] office: [phone] fax: [phone]        Physical Therapy: TREATMENT/PROGRESS NOTE   Patient: Serge Austin (63 y.o. male)   Treatment Date: 03/15/2024   :  1960 MRN: [id number]   Visit #:   Insurance Allowable Auth Needed   As of : Auth not required for PT []Yes    [x]No    Insurance: Payor: BCBS / Plan: BCBS - [place] PPO / Product Type: *No Product type* /   Insurance ID: [id number] - ([facility])  Secondary Insurance (if applicable): MEDICARE   Treatment Diagnosis:     ICD-10-CM    1. Decreased range of motion  M25.60       2. Gait abnormality  R26.9       3. Pain  R52       4. Stiffness due to immobility  M25.60     Z74.09       5. Weakness  R53.1       6. Balance disorder  R26.89          Medical Diagnosis:    M79.18 (ICD-10-CM) - Myalgia, other site   M96.1 (ICD-10-CM) - Postlaminectomy syndrome, not elsewhere classified  M54.2 (ICD-10-CM) - Cervicalgia  G89.29 (ICD-10-CM) - other chronic pain       Referring Physician: Dameon Ortiz  PCP: Kj Julien MD                             Plan of care signed (Y/N): sent 24    Date of Patient follow up with Physician: ?     Progress Report/POC: NO  1/3/23: POC  POC update due: (10 visits /OR AUTH LIMITS, whichever is less) visit 18 or  3/22/2024     Precautions/ Contra-indications:                                                                                          Latex allergy:  NO  Pacemaker:    NO  Contraindications for Manipulation: recent surgical history (relative), unhealthy/ multiple comorbidities , and remote history of spinal fusion (relative)  Date of Surgery: multiples  Other: Pt had a seizure mid 2024. had a seizure in 2023; per neurologist: no activity restrictions - PT recommending close eye on pt while in pool, though pt has a pool at home and feels comfortable in water    Preferred Language

## 2024-03-16 ENCOUNTER — HOSPITAL ENCOUNTER (OUTPATIENT)
Dept: CT IMAGING | Age: 64
Discharge: HOME | End: 2024-03-16
Attending: INTERNAL MEDICINE
Payer: COMMERCIAL

## 2024-03-16 DIAGNOSIS — Z87.09 HX OF PNEUMOTHORAX: ICD-10-CM

## 2024-03-16 DIAGNOSIS — J43.9 BULLOUS EMPHYSEMA (HCC): ICD-10-CM

## 2024-03-16 PROCEDURE — 71250 CT THORAX DX C-: CPT

## 2024-03-18 ENCOUNTER — APPOINTMENT (OUTPATIENT)
Dept: PHYSICAL THERAPY | Age: 64
End: 2024-03-18
Payer: COMMERCIAL

## 2024-03-22 ENCOUNTER — HOSPITAL ENCOUNTER (OUTPATIENT)
Dept: PHYSICAL THERAPY | Age: 64
Setting detail: THERAPIES SERIES
Discharge: HOME | End: 2024-03-22
Payer: COMMERCIAL

## 2024-03-22 PROCEDURE — 97113 AQUATIC THERAPY/EXERCISES: CPT

## 2024-03-22 PROCEDURE — 97150 GROUP THERAPEUTIC PROCEDURES: CPT

## 2024-03-22 NOTE — FLOWSHEET NOTE
for Healthcare:   [x]English       []other:    SUBJECTIVE EXAMINATION     Patient Report/Comments:  reports he had a flare up on Friday last week which is why he missed this past Monday.       Test used Initial score  11/20/23 01/15/2024 3/15   Pain Summary VAS 4-7 3-4/10 achey 5/10   Functional questionnaire [place] Back Pain Disability Scale 71 / 71% 57/57%    Other: 5xSTS    27 sec no UE support               OBJECTIVE EXAMINATION     Observation:   2/22: called MD to confirm aquatic therapy clearance since seizure in Jan 2024 April 2023 of last year recent spinal fusion surgery    Test measurements:   1/10: moving well in pool  2/22:    Mvmt (norm) ROM L ROM R Notes MMT L MMT R Notes               SHOULDER Flexion (180)       4+  4+ Grossly 4/5 B    Abduction (180)        4  4      ER -0        4+  4+      ER -90 (90)        5  5              ELBOW Flex/biceps (140)           Grossly 4/5 B    Ext/triceps (0)                Pronation (80)                Supination (80)     B limited and painful due to RA                      WRIST Flexion (60)         3+ Grossly 4/5 B except:    Extension (60)         3+       N/a N/a       WFL               HIP  Flex (120)       4 3 Pt very challenged    Abd (45)        3+ 3+       ER (50)        4 3+       IR (45)        3+ 3+            KNEE Flex (140)       4 4      Ext (0)       5 5              ANKLE DF (20)       4+ 4      PF (50)       5 5       Exercises/Interventions:     Therapeutic Ex (54632)  resistance Sets/time Reps Notes/Cues/Progressions   Nustep 4 1/3   HSS  1/3   LTR  1/3: added to HEP   Supine PPT with march     BKFO  1/3   5xSTS  27 sec  2/22   squats   x5 2/22             HEP review, [place] outcome measure completion, toured aquatic facility and explained process     Manual Intervention (52745)  TIME                                        NMR re-education (74034) resistance Sets/time Reps CUES NEEDED                                      Therapeutic

## 2024-03-25 ENCOUNTER — HOSPITAL ENCOUNTER (OUTPATIENT)
Dept: PHYSICAL THERAPY | Age: 64
Setting detail: THERAPIES SERIES
Discharge: HOME | End: 2024-03-25
Payer: COMMERCIAL

## 2024-03-25 PROCEDURE — 97150 GROUP THERAPEUTIC PROCEDURES: CPT

## 2024-03-25 PROCEDURE — 97113 AQUATIC THERAPY/EXERCISES: CPT

## 2024-03-25 NOTE — FLOWSHEET NOTE
[facility] - Outpatient Rehabilitation and Therapy [address], [place] [address] office: [phone] fax: [phone]        Physical Therapy: TREATMENT/PROGRESS NOTE   Patient: Serge Austin (63 y.o. male)   Treatment Date: 2024   :  1960 MRN: [id number]   Visit #:   Insurance Allowable Auth Needed   As of : Auth not required for PT []Yes    [x]No    Insurance: Payor: BCBS / Plan: BCBS [place] PPO / Product Type: *No Product type* /   Insurance ID: [id number] - ([facility])  Secondary Insurance (if applicable): MEDICARE   Treatment Diagnosis:     ICD-10-CM    1. Decreased range of motion  M25.60       2. Gait abnormality  R26.9       3. Pain  R52       4. Stiffness due to immobility  M25.60     Z74.09       5. Weakness  R53.1       6. Balance disorder  R26.89          Medical Diagnosis:    M79.18 (ICD-10-CM) - Myalgia, other site   M96.1 (ICD-10-CM) - Postlaminectomy syndrome, not elsewhere classified  M54.2 (ICD-10-CM) - Cervicalgia  G89.29 (ICD-10-CM) - other chronic pain       Referring Physician: Dameon Ortiz  PCP: Kj Julien MD                             Plan of care signed (Y/N): sent 24    Date of Patient follow up with Physician: ?     Progress Report/POC: NO  1/3/23: POC  POC update due: (10 visits /OR AUTH LIMITS, whichever is less) visit 18 or  3/22/2024     Precautions/ Contra-indications:                                                                                          Latex allergy:  NO  Pacemaker:    NO  Contraindications for Manipulation: recent surgical history (relative), unhealthy/ multiple comorbidities , and remote history of spinal fusion (relative)  Date of Surgery: multiples  Other: Pt had a seizure mid 2024. had a seizure in 2023; per neurologist: no activity restrictions - PT recommending close eye on pt while in pool, though pt has a pool at home and feels comfortable in water    Preferred Language

## 2024-03-29 ENCOUNTER — HOSPITAL ENCOUNTER (OUTPATIENT)
Dept: PHYSICAL THERAPY | Age: 64
Setting detail: THERAPIES SERIES
Discharge: HOME | End: 2024-03-29
Payer: COMMERCIAL

## 2024-03-29 PROCEDURE — 97750 PHYSICAL PERFORMANCE TEST: CPT

## 2024-03-29 PROCEDURE — 97110 THERAPEUTIC EXERCISES: CPT

## 2024-03-29 ASSESSMENT — PAIN SCALES - QUEBEC BACK PAIN DISABILITY SCALE
MOVE A CHAIR: SOMEWHAT DIFFICULT
WALK SEVERAL KILOMETERS  OR MILES: UNABLE TO DO
RUN ONE BLOCK OR 100M: UNABLE TO DO
SLEEP THROUGH THE NIGHT: VERY DIFFICULT
TOTAL SCORE: 62
MAKE YOUR BED: FAIRLY DIFFICULT
CARRY TWO BAGS OF GROCERIES: SOMEWHAT DIFFICULT
TAKE FOOD OUT OF THE REFRIGERATOR: SOMEWHAT DIFFICULT
THROW A BALL: FAIRLY DIFFICULT
RIDE IN A CAR: SOMEWHAT DIFFICULT
STAND UP FOR 20 TO 30 MINUTES: FAIRLY DIFFICULT
WALK A FEW BLOCKS OR 300 TO 400M: FAIRLY DIFFICULT
GET OUT OF BED: SOMEWHAT DIFFICULT
PULL OR PUSH HEAVY DOORS: FAIRLY DIFFICULT
LIFT AND CARRY A HEAVY SUITCASE: VERY DIFFICULT
REACH UP TO HIGH SHELVES: SOMEWHAT DIFFICULT
PUT ON SOCKS OR PANYHOSE: FAIRLY DIFFICULT
BEND OVER TO CLEAN THE BATHTUB: VERY DIFFICULT
SIT IN A CHAIR FOR SEVERAL HOURS: UNABLE TO DO
TURN OVER IN BED: SOMEWHAT DIFFICULT
CLIMB ONE FLIGHT OF STAIRS: FAIRLY DIFFICULT

## 2024-03-29 NOTE — PLAN OF CARE
[facility] - Outpatient Rehabilitation and Therapy [address], [place] [address] office: [phone] fax: [phone]  Physical Therapy Re-Certification Plan of Care    Dear Dameon Ortiz  ,    We had the pleasure of treating the following patient for physical therapy services at [facility]. A summary of our findings can be found in the updated assessment below.  This includes our plan of care.  If you have any questions or concerns regarding these findings, please do not hesitate to contact me at the office phone number checked above.  Thank you for the referral.     Physician Signature:________________________________Date:__________________  By signing above (or electronic signature), therapist's plan is approved by physician      Functional Outcome: quebec: 62%  Serge Austin 1960 continues to present with functional deficits in strength symmetry, endurance of strength, cardiovascular endurance, and eccentric control  limiting ability with walking on even ground, walking on uneven ground, managing community ambulation, walking up/down stairs, navigate curbs/steps, transitions between positions, managing bed mobility, don/doff shoes/socks, light home activity, and heavy home activity .  During therapy this date, patient required verbal cueing, tactile cueing, and progression of exercises and program for exercise progression, improving proper muscle recruitment and activation/motor control patterns, modulating pain, reduce/eliminate soft tissue swelling/inflammation/restriction, static and dynamic balance, and improving postural awareness. Patient will continue to benefit from ongoing evaluation and advanced clinical decision from a Physical Therapist to improve pain control, muscle strength, neuromuscular control, endurance, normalization of gait, balance and proprioception, and proper body mechanics to safely return to OF without symptoms or

## 2024-04-04 ENCOUNTER — OFFICE VISIT (OUTPATIENT)
Dept: PULMONOLOGY | Age: 64
End: 2024-04-04
Payer: COMMERCIAL

## 2024-04-04 VITALS
OXYGEN SATURATION: 97 % | DIASTOLIC BLOOD PRESSURE: 70 MMHG | HEART RATE: 78 BPM | SYSTOLIC BLOOD PRESSURE: 128 MMHG | BODY MASS INDEX: 25.18 KG/M2 | WEIGHT: 190 LBS | HEIGHT: 73 IN

## 2024-04-04 DIAGNOSIS — Z87.09 HISTORY OF PNEUMOTHORAX: ICD-10-CM

## 2024-04-04 DIAGNOSIS — J44.9 COPD, SEVERE (HCC): Primary | ICD-10-CM

## 2024-04-04 PROCEDURE — G8427 DOCREV CUR MEDS BY ELIG CLIN: HCPCS | Performed by: INTERNAL MEDICINE

## 2024-04-04 PROCEDURE — 3078F DIAST BP <80 MM HG: CPT | Performed by: INTERNAL MEDICINE

## 2024-04-04 PROCEDURE — 3023F SPIROM DOC REV: CPT | Performed by: INTERNAL MEDICINE

## 2024-04-04 PROCEDURE — 99214 OFFICE O/P EST MOD 30 MIN: CPT | Performed by: INTERNAL MEDICINE

## 2024-04-04 PROCEDURE — 1036F TOBACCO NON-USER: CPT | Performed by: INTERNAL MEDICINE

## 2024-04-04 PROCEDURE — 3017F COLORECTAL CA SCREEN DOC REV: CPT | Performed by: INTERNAL MEDICINE

## 2024-04-04 PROCEDURE — G8419 CALC BMI OUT NRM PARAM NOF/U: HCPCS | Performed by: INTERNAL MEDICINE

## 2024-04-04 PROCEDURE — 3074F SYST BP LT 130 MM HG: CPT | Performed by: INTERNAL MEDICINE

## 2024-04-04 RX ORDER — FLUTICASONE FUROATE, UMECLIDINIUM BROMIDE AND VILANTEROL TRIFENATATE 100; 62.5; 25 UG/1; UG/1; UG/1
1 POWDER RESPIRATORY (INHALATION) DAILY
Qty: 1 EACH | Refills: 3 | Status: SHIPPED | OUTPATIENT
Start: 2024-04-04

## 2024-04-04 RX ORDER — BUDESONIDE AND FORMOTEROL FUMARATE DIHYDRATE 160; 4.5 UG/1; UG/1
2 AEROSOL RESPIRATORY (INHALATION) 2 TIMES DAILY
COMMUNITY

## 2024-04-04 RX ORDER — BUDESONIDE AND FORMOTEROL FUMARATE DIHYDRATE 160; 4.5 UG/1; UG/1
2 AEROSOL RESPIRATORY (INHALATION) 2 TIMES DAILY
Qty: 30.6 G | Refills: 2 | Status: CANCELLED | OUTPATIENT
Start: 2024-04-04

## 2024-04-04 RX ORDER — ALBUTEROL SULFATE 90 UG/1
2 AEROSOL, METERED RESPIRATORY (INHALATION) 4 TIMES DAILY PRN
Qty: 54 G | Refills: 2 | Status: SHIPPED | OUTPATIENT
Start: 2024-04-04

## 2024-04-04 RX ORDER — ALBUTEROL SULFATE 90 UG/1
2 AEROSOL, METERED RESPIRATORY (INHALATION) EVERY 6 HOURS PRN
COMMUNITY

## 2024-04-04 ASSESSMENT — ENCOUNTER SYMPTOMS
BACK PAIN: 0
RHINORRHEA: 0
ABDOMINAL DISTENTION: 0
WHEEZING: 0
COUGH: 0
DIARRHEA: 0
STRIDOR: 0
VOICE CHANGE: 0
BLOOD IN STOOL: 0
APNEA: 0
CHOKING: 0
SHORTNESS OF BREATH: 1
ABDOMINAL PAIN: 0
COLOR CHANGE: 0
VOMITING: 0
CHEST TIGHTNESS: 0
SORE THROAT: 0
SINUS PRESSURE: 0
CONSTIPATION: 0

## 2024-04-04 NOTE — PROGRESS NOTES
Serge Austin    YOB: 1960     Date of Service:  4/4/2024     Chief Complaint   Patient presents with    1 Month Follow-Up    Results     CT & PFT       HPI patient is here for the results of CT and PFT study.  Some dyspnea with exertion, no significant cough or phlegm.  Patient states that he is close to his baseline.    Allergies   Allergen Reactions    Metformin Other (See Comments)     GI upset    Shellfish-Derived Products Hives    Simvastatin Other (See Comments)     GI upset     Outpatient Medications Marked as Taking for the 4/4/24 encounter (Office Visit) with Leandro Roman MD   Medication Sig Dispense Refill    albuterol sulfate HFA (PROVENTIL;VENTOLIN;PROAIR) 108 (90 Base) MCG/ACT inhaler Inhale 2 puffs into the lungs every 6 hours as needed for Wheezing      budesonide-formoterol (SYMBICORT) 160-4.5 MCG/ACT AERO Inhale 2 puffs into the lungs 2 times daily      albuterol sulfate HFA (VENTOLIN HFA) 108 (90 Base) MCG/ACT inhaler Inhale 2 puffs into the lungs 4 times daily as needed for Wheezing 54 g 2    fluticasone-umeclidin-vilant (TRELEGY ELLIPTA) 100-62.5-25 MCG/ACT AEPB inhaler Inhale 1 puff into the lungs daily 1 each 3    folic acid (FOLVITE) 1 MG tablet Take 1 tablet by mouth in the morning and at bedtime      amLODIPine (NORVASC) 10 MG tablet Take 1 tablet by mouth daily      hydroxychloroquine (PLAQUENIL) 200 MG tablet Take 1 tablet by mouth 2 times daily      valsartan (DIOVAN) 320 MG tablet Take 1 tablet by mouth daily      tadalafil (CIALIS) 20 MG tablet SMARTSIG:Tablet(s) By Mouth      mometasone-formoterol (DULERA) 200-5 MCG/ACT inhaler Inhale 2 puffs into the lungs as needed      tamsulosin (FLOMAX) 0.4 MG capsule Take 1 capsule by mouth daily      pravastatin (PRAVACHOL) 20 MG tablet Take 1 tablet by mouth daily      LINZESS 290 MCG CAPS capsule every morning (before breakfast)       acetaminophen (TYLENOL 8 HOUR ARTHRITIS PAIN) 650 MG extended release tablet 
No  complications, normal ultrasounds.  Prenatal labs negative.    PHYSICAL EXAM:  Height (cm): 47 ( @ 11:56)  Weight (kg): 3.055 ( @ 11:56)  BMI (kg/m2): 13.8 ( @ 11:56)  General: Well developed; well nourished; in no acute distress    Eyes: PERRL (A), EOM intact; conjunctiva and sclera clear, extra ocular movements intact, red reflex positive bilaterally  Head: Normocephalic; atraumatic; AFOF, PFOF, (+) mild caput  ENMT: External ear normal, tympanic membranes intact, nasal mucosa normal, no nasal discharge; airway clear, oropharynx clear  Neck: Supple; non tender; No cervical adenopathy  Respiratory: No chest wall deformity, normal respiratory pattern, clear to auscultation bilaterally  Cardiovascular: Regular rate and rhythm. S1 and S2 Normal; No murmurs, gallops or rubs  Abdominal: Soft non-tender non-distended; normal bowel sounds; no hepatosplenomegaly; no masses  Genitourinary: No costovertebral angle tenderness. Normal external genitalia for age  Rectal: No masses or lesions  Extremities: Full range of motion, no tenderness, no cyanosis or edema, negative alatorre and ortoloni  Vascular: Upper and lower peripheral pulses palpable 2+ bilaterally  Neurological: Alert, affect appropriate, no acute change from baseline. No meningeal signs  Skin: Warm and dry. No acute rash, no subcutaneous nodules  Lymph Nodes: No  adenopathy  Musculoskeletal: Normal tone, good ROM, without deformities      Parent/ Guardian at bedside and updated as to plan of care [ x] yes [ ] no
Admission

## 2024-04-05 NOTE — PLAN OF CARE
Problem: Mobility - Impaired:  Goal: Achieve maximum mobility level  12/18/2019 0436 by Jennifer Gimenez RN  Outcome: Ongoing  Note:   Pt up to side of bed, tolerated well. Will continue to monitor. ,      Problem: Pain - Acute:  Goal: Pain level will decrease  Outcome: Ongoing  Note:   Pt continues to rate pain a 8/10 with minimal relief. Ice applied. Will continue to monitor. You can access the FollowMyHealth Patient Portal offered by [facility] by registering at the following website: [email or web address] By joining [employer]’s FollowMyHealth portal, you will also be able to view your health information using other applications (apps) compatible with our system.

## 2024-04-12 ENCOUNTER — HOSPITAL ENCOUNTER (OUTPATIENT)
Dept: PHYSICAL THERAPY | Age: 64
Setting detail: THERAPIES SERIES
Discharge: HOME | End: 2024-04-12
Payer: COMMERCIAL

## 2024-04-12 PROCEDURE — 97112 NEUROMUSCULAR REEDUCATION: CPT

## 2024-04-12 PROCEDURE — 97110 THERAPEUTIC EXERCISES: CPT

## 2024-04-12 NOTE — FLOWSHEET NOTE
water    Preferred Language for Healthcare:   [x]English       []other:    SUBJECTIVE EXAMINATION     Patient Report/Comments:  Had his infusion and isn't sure if it is helpful. Pain is always worse in the morning. Felt okay after last session. Has pain down his legs at night and has trouble sleeping - trouble sleeping longer than 4 hours. Has an appointment after PT today to see Dr. Ortiz for pain management and get injections in spine. Was able to do elliptical and bike on Wednesday for 30-40 minutes each; afterwards slept better at night nad felt better the next morning.       Test used Initial score  11/20/23 01/15/2024 3/29   Pain Summary VAS 4-7 3-4/10 achey 5/10   Functional questionnaire [place] Back Pain Disability Scale 71 / 71% 57/57% 62%   Other: 5xSTS    27 sec no UE support               OBJECTIVE EXAMINATION     Observation:   3/29: gait with BLE ER , inc in swelling on L knee and warmth  2/22: called MD to confirm aquatic therapy clearance since seizure in Jan 2024 April 2023 of last year recent spinal fusion surgery    Test measurements:   3/29:    Mvmt (norm) ROM L ROM R Notes MMT L MMT R Notes               HIP  Flex (120)       4+ 5 Pt very challenged    Abd (45)        3+ 3+       ER (50)        4 3+       IR (45)        3+ 3+            KNEE Flex (140)       5 5      Ext (0)       5 5              ANKLE DF (20)       4+ 4      PF (50)       5 5     5xSTS: 23 seconds, standard chair no BUE use    1/10: moving well in pool  2/22:    Mvmt (norm) ROM L ROM R Notes MMT L MMT R Notes               SHOULDER Flexion (180)       4+  4+ Grossly 4/5 B    Abduction (180)        4  4      ER -0        4+  4+      ER -90 (90)        5  5              ELBOW Flex/biceps (140)           Grossly 4/5 B    Ext/triceps (0)                Pronation (80)                Supination (80)     B limited and painful due to RA                      WRIST Flexion (60)         3+ Grossly 4/5 B except:    Extension (60)

## 2024-04-15 ENCOUNTER — HOSPITAL ENCOUNTER (OUTPATIENT)
Dept: PHYSICAL THERAPY | Age: 64
Setting detail: THERAPIES SERIES
Discharge: HOME | End: 2024-04-15
Payer: COMMERCIAL

## 2024-04-15 PROCEDURE — 97112 NEUROMUSCULAR REEDUCATION: CPT

## 2024-04-15 PROCEDURE — 97110 THERAPEUTIC EXERCISES: CPT

## 2024-04-15 NOTE — FLOWSHEET NOTE
Kickboard        Modalities:    No modalities applied this session    Education/Home Exercise Program:   Access Code: [email or web address]  URL: [email or web address]  Date: 03/29/2024  Prepared by: Sai Cintron  4/12: verbally added cat/cow and quadruped LE ext  Exercises  - Supine Lower Trunk Rotation  - 1 x daily - 7 x weekly - 1-2 sets - 10 reps  - Hooklying Single Knee to Chest Stretch  - 1 x daily - 7 x weekly - 1-2 sets - 10 reps  - Supine March with Posterior Pelvic Tilt  - 1 x daily - 7 x weekly - 3 sets - 10 reps  - Supine Bridge with Resistance Band  - 1 x daily - 7 x weekly - 3 sets - 10 reps    Patient Education  - Sleep Positions    2/22:Reassessed goals and revised goals, issued outcome measure and scored, discussed areas of improvement and areas left for gains, educated about transition from aquatic to land therapy.   Patient HEP program created electronically.  Refer to [email or web address] access code: [email or web address]    Access Code: [email or web address]  URL: [email or web address]  Date: 02/22/2024  Prepared by: Neda Gómez    - Squat with Chair Touch  - 1 x daily - 7 x weekly - 3 sets - 10 reps    [email or web address]  URL: [email or web address]  Date: 01/03/2024  Prepared by: Sai Cintron    Exercises  - Standing Hamstring Stretch with Step  - 2 x daily - 7 x weekly - 3 sets - 30 seconds hold  - Supine Lower Trunk Rotation  - 2 x daily - 7 x weekly - 3 sets - 10 reps  - Supine Posterior Pelvic Tilt  - 2 x daily - 7 x weekly - 3 sets - 10 reps  - Supine March with Posterior Pelvic Tilt  - 2 x daily - 7 x weekly - 3 sets - 10 reps  - Bent Knee Fallouts  - 2 x daily - 7 x weekly - 3 sets - 10 reps    Patient Education  - Sleep Positions  Access Code: [email or web address]  URL: [email or web address]  Date: 11/20/2023  Prepared by: Ratna Sanders    Exercises  - Seated Hamstring Stretch  - 1 x daily - 7 x weekly - 1 sets - 3 reps - 20 hold  - Seated Scapular Retraction  - 1 x daily - 7 x weekly - 1-2 sets - 10 reps  - Seated Cervical

## 2024-04-17 ENCOUNTER — HOSPITAL ENCOUNTER (OUTPATIENT)
Dept: PHYSICAL THERAPY | Age: 64
Setting detail: THERAPIES SERIES
Discharge: HOME | End: 2024-04-17
Payer: COMMERCIAL

## 2024-04-17 PROCEDURE — 97110 THERAPEUTIC EXERCISES: CPT

## 2024-04-17 PROCEDURE — 97112 NEUROMUSCULAR REEDUCATION: CPT

## 2024-04-17 NOTE — FLOWSHEET NOTE
in pool, though pt has a pool at home and feels comfortable in water    Preferred Language for Healthcare:   [x]English       []other:    SUBJECTIVE EXAMINATION     Patient Report/Comments:  Felt looser after last session. Had infusion yesterday and slept afterwards. Feels like infusions have been helpful.          Test used Initial score  11/20/23 01/15/2024 3/29   Pain Summary VAS 4-7 3-4/10 achey 3/10   Functional questionnaire [place] Back Pain Disability Scale 71 / 71% 57/57% 62%   Other: 5xSTS    27 sec no UE support               OBJECTIVE EXAMINATION     Observation:   3/29: gait with BLE ER , inc in swelling on L knee and warmth  2/22: called MD to confirm aquatic therapy clearance since seizure in Jan 2024 April 2023 of last year recent spinal fusion surgery    Test measurements:   3/29:    Mvmt (norm) ROM L ROM R Notes MMT L MMT R Notes               HIP  Flex (120)       4+ 5 Pt very challenged    Abd (45)        3+ 3+       ER (50)        4 3+       IR (45)        3+ 3+            KNEE Flex (140)       5 5      Ext (0)       5 5              ANKLE DF (20)       4+ 4      PF (50)       5 5     5xSTS: 23 seconds, standard chair no BUE use    1/10: moving well in pool  2/22:    Mvmt (norm) ROM L ROM R Notes MMT L MMT R Notes               SHOULDER Flexion (180)       4+  4+ Grossly 4/5 B    Abduction (180)        4  4      ER -0        4+  4+      ER -90 (90)        5  5              ELBOW Flex/biceps (140)           Grossly 4/5 B    Ext/triceps (0)                Pronation (80)                Supination (80)     B limited and painful due to RA                      WRIST Flexion (60)         3+ Grossly 4/5 B except:    Extension (60)         3+       N/a N/a       WFL               HIP  Flex (120)       4 3 Pt very challenged    Abd (45)        3+ 3+       ER (50)        4 3+       IR (45)        3+ 3+            KNEE Flex (140)       4 4      Ext (0)       5 5              ANKLE DF (20)       4+ 4

## 2024-04-22 ENCOUNTER — HOSPITAL ENCOUNTER (OUTPATIENT)
Dept: PHYSICAL THERAPY | Age: 64
Setting detail: THERAPIES SERIES
Discharge: HOME | End: 2024-04-22
Payer: COMMERCIAL

## 2024-04-22 PROCEDURE — 97110 THERAPEUTIC EXERCISES: CPT

## 2024-04-22 PROCEDURE — 97112 NEUROMUSCULAR REEDUCATION: CPT

## 2024-04-22 NOTE — FLOWSHEET NOTE
[facility] - Outpatient Rehabilitation and Therapy [address], [place] [address] office: [phone] fax: [phone]    Physical Therapy: TREATMENT/PROGRESS NOTE   Patient: Serge Austin (63 y.o. male)   Treatment Date: 2024   :  1960 MRN: [id number]   Visit #:    POC:3/29 (+16 visits)  Insurance Allowable Auth Needed   As of : Auth not required for PT []Yes    [x]No    Insurance: Payor: BCBS / Plan: BCBS - [place] PPO / Product Type: *No Product type* /   Insurance ID: [id number] - ([place] [facility])  Secondary Insurance (if applicable): MEDICARE   Treatment Diagnosis:     ICD-10-CM    1. Decreased range of motion  M25.60       2. Gait abnormality  R26.9       3. Pain  R52       4. Stiffness due to immobility  M25.60     Z74.09       5. Weakness  R53.1       6. Balance disorder  R26.89          Medical Diagnosis:    M79.18 (ICD-10-CM) - Myalgia, other site   M96.1 (ICD-10-CM) - Postlaminectomy syndrome, not elsewhere classified  M54.2 (ICD-10-CM) - Cervicalgia  G89.29 (ICD-10-CM) - other chronic pain       Referring Physician: Dameon Ortiz MD  PCP: Kj Julien MD                             Plan of care signed (Y/N): sent 24    Date of Patient follow up with Physician: May 31 with [facility] for his blood sugar- Mekhi. May 14th has 3/3 infusions and then infusions move to every 2 months.     Progress Report/POC: NO  1/3/23: POC  POC update due: (10 visits /OR AUTH LIMITS, whichever is less) visit 18 or  2024     Precautions/ Contra-indications:                                                                                          Latex allergy:  NO  Pacemaker:    NO  Contraindications for Manipulation: recent surgical history (relative), unhealthy/ multiple comorbidities , and remote history of spinal fusion (relative)  Date of Surgery: multiples  Other: Pt had a seizure mid 2024. had a seizure in 2023; per neurologist: no activity

## 2024-04-24 ENCOUNTER — HOSPITAL ENCOUNTER (OUTPATIENT)
Dept: PHYSICAL THERAPY | Age: 64
Setting detail: THERAPIES SERIES
End: 2024-04-24
Payer: COMMERCIAL

## 2024-04-29 ENCOUNTER — HOSPITAL ENCOUNTER (OUTPATIENT)
Dept: PHYSICAL THERAPY | Age: 64
Setting detail: THERAPIES SERIES
Discharge: HOME | End: 2024-04-29
Payer: COMMERCIAL

## 2024-04-29 PROCEDURE — 97110 THERAPEUTIC EXERCISES: CPT

## 2024-04-29 PROCEDURE — 97112 NEUROMUSCULAR REEDUCATION: CPT

## 2024-04-29 NOTE — FLOWSHEET NOTE
[facility] - Outpatient Rehabilitation and Therapy [address], [place] [address] office: [phone] fax: [phone]    Physical Therapy: TREATMENT/PROGRESS NOTE   Patient: Serge Austin (63 y.o. male)   Treatment Date: 2024   :  1960 MRN: [id number]   Visit #:    POC:3/29 (+16 visits)  Insurance Allowable Auth Needed   As of : Auth not required for PT []Yes    [x]No    Insurance: Payor: BCBS / Plan: BCBS - [place] PPO / Product Type: *No Product type* /   Insurance ID: [id number] - ([place] [facility])  Secondary Insurance (if applicable): MEDICARE   Treatment Diagnosis:     ICD-10-CM    1. Decreased range of motion  M25.60       2. Gait abnormality  R26.9       3. Pain  R52       4. Stiffness due to immobility  M25.60     Z74.09       5. Weakness  R53.1       6. Balance disorder  R26.89          Medical Diagnosis:    M79.18 (ICD-10-CM) - Myalgia, other site   M96.1 (ICD-10-CM) - Postlaminectomy syndrome, not elsewhere classified  M54.2 (ICD-10-CM) - Cervicalgia  G89.29 (ICD-10-CM) - other chronic pain       Referring Physician: Dameon Ortiz MD  PCP: Kj Julien MD                             Plan of care signed (Y/N): sent 24    Date of Patient follow up with Physician: May 31 with [facility] for his blood sugar- Mekhi. May 14th has 3/3 infusions and then infusions move to every 2 months.     Progress Report/POC: NO  1/3/23: POC  POC update due: (10 visits /OR AUTH LIMITS, whichever is less) visit 18 or  2024     Precautions/ Contra-indications:                                                                                          Latex allergy:  NO  Pacemaker:    NO  Contraindications for Manipulation: recent surgical history (relative), unhealthy/ multiple comorbidities , and remote history of spinal fusion (relative)  Date of Surgery: multiples  Other: Pt had a seizure mid 2024. had a seizure in 2023; per neurologist: no activity

## 2024-05-01 ENCOUNTER — HOSPITAL ENCOUNTER (OUTPATIENT)
Dept: PHYSICAL THERAPY | Age: 64
Setting detail: THERAPIES SERIES
Discharge: HOME | End: 2024-05-01
Payer: COMMERCIAL

## 2024-05-01 PROCEDURE — 97530 THERAPEUTIC ACTIVITIES: CPT

## 2024-05-01 PROCEDURE — 97110 THERAPEUTIC EXERCISES: CPT

## 2024-05-01 ASSESSMENT — PAIN SCALES - QUEBEC BACK PAIN DISABILITY SCALE
MOVE A CHAIR: SOMEWHAT DIFFICULT
GET OUT OF BED: FAIRLY DIFFICULT
CARRY TWO BAGS OF GROCERIES: SOMEWHAT DIFFICULT
BEND OVER TO CLEAN THE BATHTUB: VERY DIFFICULT
LIFT AND CARRY A HEAVY SUITCASE: SOMEWHAT DIFFICULT
SIT IN A CHAIR FOR SEVERAL HOURS: UNABLE TO DO
THROW A BALL: FAIRLY DIFFICULT
TAKE FOOD OUT OF THE REFRIGERATOR: SOMEWHAT DIFFICULT
PULL OR PUSH HEAVY DOORS: FAIRLY DIFFICULT
RIDE IN A CAR: SOMEWHAT DIFFICULT
TOTAL SCORE: 57
SLEEP THROUGH THE NIGHT: UNABLE TO DO
CLIMB ONE FLIGHT OF STAIRS: FAIRLY DIFFICULT
MAKE YOUR BED: FAIRLY DIFFICULT
PUT ON SOCKS OR PANYHOSE: SOMEWHAT DIFFICULT
RUN ONE BLOCK OR 100M: UNABLE TO DO
WALK SEVERAL KILOMETERS  OR MILES: SOMEWHAT DIFFICULT
WALK A FEW BLOCKS OR 300 TO 400M: SOMEWHAT DIFFICULT
REACH UP TO HIGH SHELVES: SOMEWHAT DIFFICULT
STAND UP FOR 20 TO 30 MINUTES: FAIRLY DIFFICULT
TURN OVER IN BED: SOMEWHAT DIFFICULT

## 2024-05-07 ENCOUNTER — HOSPITAL ENCOUNTER (OUTPATIENT)
Dept: PHYSICAL THERAPY | Age: 64
Setting detail: THERAPIES SERIES
Discharge: HOME | End: 2024-05-07
Payer: COMMERCIAL

## 2024-05-07 PROCEDURE — 97110 THERAPEUTIC EXERCISES: CPT

## 2024-05-07 PROCEDURE — 97530 THERAPEUTIC ACTIVITIES: CPT

## 2024-05-07 NOTE — FLOWSHEET NOTE
and proprioception. Next visit plan to progress weights, progress reps, add new exercises, and progress balance  initiate land therapy     Electronically Signed by Sai Cintron PT, DPT     Date: 05/07/2024       Note: If patient does not return for scheduled/recommended follow up visits, this note will serve as a discharge from care along with the most recent update on progress.

## 2024-05-10 ENCOUNTER — HOSPITAL ENCOUNTER (OUTPATIENT)
Dept: PHYSICAL THERAPY | Age: 64
Setting detail: THERAPIES SERIES
Discharge: HOME | End: 2024-05-10
Payer: COMMERCIAL

## 2024-05-10 PROCEDURE — 97110 THERAPEUTIC EXERCISES: CPT

## 2024-05-10 NOTE — FLOWSHEET NOTE
[facility] - Outpatient Rehabilitation and Therapy [address], [place] [address] office: [phone] fax: [phone]  Physical Therapy: TREATMENT/PROGRESS NOTE   Patient: Serge Austin (63 y.o. male)   Treatment Date: 05/10/2024   :  1960 MRN: [id number]   Visit #:    POC:3/29 (+16 visits)  POC: 24  Insurance Allowable Auth Needed   As of : Auth not required for PT []Yes    [x]No    Insurance: Payor: BCBS / Plan: BCBS - [place] PPO / Product Type: *No Product type* /   Insurance ID: [id number] - ([facility])  Secondary Insurance (if applicable): MEDICARE   Treatment Diagnosis:     ICD-10-CM    1. Decreased range of motion  M25.60       2. Gait abnormality  R26.9       3. Pain  R52       4. Stiffness due to immobility  M25.60     Z74.09       5. Weakness  R53.1       6. Balance disorder  R26.89          Medical Diagnosis:    M79.18 (ICD-10-CM) - Myalgia, other site   M96.1 (ICD-10-CM) - Postlaminectomy syndrome, not elsewhere classified  M54.2 (ICD-10-CM) - Cervicalgia  G89.29 (ICD-10-CM) - other chronic pain       Referring Physician: Dameon Ortiz MD  PCP: Kj Julien MD                             Plan of care signed (Y/N): sent 24    Date of Patient follow up with Physician: May 31 with [facility] for his blood sugar- Mekhi. May 14th has 3/3 infusions and then infusions move to every 2 months. Follow up with Dr. Ortiz 5/10 to discusses medications     Progress Report/POC: NO  1/3/23: POC  POC update due: (10 visits /OR AUTH LIMITS, whichever is less) visit 2024     Precautions/ Contra-indications:                                                                                          Latex allergy:  NO  Pacemaker:    NO  Contraindications for Manipulation: recent surgical history (relative), unhealthy/ multiple comorbidities , and remote history of spinal fusion (relative)  Date of Surgery: multiples  Other: Pt had a seizure mid 2024.

## 2024-05-13 ENCOUNTER — HOSPITAL ENCOUNTER (OUTPATIENT)
Dept: PHYSICAL THERAPY | Age: 64
Setting detail: THERAPIES SERIES
Discharge: HOME | End: 2024-05-13
Payer: COMMERCIAL

## 2024-05-13 PROCEDURE — 97110 THERAPEUTIC EXERCISES: CPT

## 2024-05-13 PROCEDURE — 97112 NEUROMUSCULAR REEDUCATION: CPT

## 2024-05-13 NOTE — FLOWSHEET NOTE
[facility] - Outpatient Rehabilitation and Therapy [address], [place] [address] office: [phone] fax: [phone]  Physical Therapy: TREATMENT/PROGRESS NOTE   Patient: Serge Austin (63 y.o. male)   Treatment Date: 2024   :  1960 MRN: [id number]   Visit #:    POC:3/29 (+16 visits)  POC: 24  Insurance Allowable Auth Needed   As of : Auth not required for PT []Yes    [x]No    Insurance: Payor: BCBS / Plan: BCBS [place] PPO / Product Type: *No Product type* /   Insurance ID: [id number] - ([facility])  Secondary Insurance (if applicable): MEDICARE   Treatment Diagnosis:     ICD-10-CM    1. Decreased range of motion  M25.60       2. Gait abnormality  R26.9       3. Pain  R52       4. Stiffness due to immobility  M25.60     Z74.09       5. Weakness  R53.1       6. Balance disorder  R26.89          Medical Diagnosis:    M79.18 (ICD-10-CM) - Myalgia, other site   M96.1 (ICD-10-CM) - Postlaminectomy syndrome, not elsewhere classified  M54.2 (ICD-10-CM) - Cervicalgia  G89.29 (ICD-10-CM) - other chronic pain       Referring Physician: Dameon Ortiz MD  PCP: Kj Julien MD                             Plan of care signed (Y/N): sent 24    Date of Patient follow up with Physician: May 31 with [facility] for his blood sugar- Mekhi. May 14th has 3/3 infusions and then infusions move to every 2 months. Follow up with Dr. Ortiz 5/10 to discusses medications     Progress Report/POC: NO  1/3/23: POC  POC update due: (10 visits /OR AUTH LIMITS, whichever is less) visit 2024     Precautions/ Contra-indications:                                                                                          Latex allergy:  NO  Pacemaker:    NO  Contraindications for Manipulation: recent surgical history (relative), unhealthy/ multiple comorbidities , and remote history of spinal fusion (relative)  Date of Surgery: multiples  Other: Pt had a seizure mid 2024.

## 2024-05-15 ENCOUNTER — APPOINTMENT (OUTPATIENT)
Dept: PHYSICAL THERAPY | Age: 64
End: 2024-05-15
Payer: COMMERCIAL

## 2024-05-20 ENCOUNTER — HOSPITAL ENCOUNTER (OUTPATIENT)
Dept: PHYSICAL THERAPY | Age: 64
Setting detail: THERAPIES SERIES
End: 2024-05-20
Payer: COMMERCIAL

## 2024-05-22 ENCOUNTER — HOSPITAL ENCOUNTER (OUTPATIENT)
Dept: PHYSICAL THERAPY | Age: 64
Setting detail: THERAPIES SERIES
Discharge: HOME | End: 2024-05-22
Payer: COMMERCIAL

## 2024-05-22 PROCEDURE — 97112 NEUROMUSCULAR REEDUCATION: CPT

## 2024-05-22 PROCEDURE — 97110 THERAPEUTIC EXERCISES: CPT

## 2024-05-22 PROCEDURE — 97530 THERAPEUTIC ACTIVITIES: CPT

## 2024-05-22 NOTE — FLOWSHEET NOTE
[facility] - Outpatient Rehabilitation and Therapy [address], [place] [address] office: [phone] fax: [phone]  Physical Therapy: TREATMENT/PROGRESS NOTE   Patient: Serge Austin (63 y.o. male)   Treatment Date: 2024   :  1960 MRN: [id number]   Visit #:    POC:3/29 (+16 visits)  POC: 24  Insurance Allowable Auth Needed   As of : Auth not required for PT []Yes    [x]No    Insurance: Payor: BCBS / Plan: BCBS [place] PPO / Product Type: *No Product type* /   Insurance ID: [id number] - ([facility])  Secondary Insurance (if applicable): MEDICARE   Treatment Diagnosis:     ICD-10-CM    1. Decreased range of motion  M25.60       2. Gait abnormality  R26.9       3. Pain  R52       4. Stiffness due to immobility  M25.60     Z74.09       5. Weakness  R53.1       6. Balance disorder  R26.89          Medical Diagnosis:    M79.18 (ICD-10-CM) - Myalgia, other site   M96.1 (ICD-10-CM) - Postlaminectomy syndrome, not elsewhere classified  M54.2 (ICD-10-CM) - Cervicalgia  G89.29 (ICD-10-CM) - other chronic pain       Referring Physician: Dameon Ortiz MD  PCP: Kj Julien MD                             Plan of care signed (Y/N): sent 24    Date of Patient follow up with Physician: May 31 with [facility] for his blood sugar- Mekhi. May 14th has 3/3 infusions and then infusions move to every 2 months. Follow up with Dr. Ortiz 5/10 to discusses medications     Progress Report/POC: NO  1/3/23: POC  POC update due: (10 visits /OR AUTH LIMITS, whichever is less) visit 2024     Precautions/ Contra-indications:                                                                                          Latex allergy:  NO  Pacemaker:    NO  Contraindications for Manipulation: recent surgical history (relative), unhealthy/ multiple comorbidities , and remote history of spinal fusion (relative)  Date of Surgery: multiples  Other: Pt had a seizure mid 2024.

## 2024-05-28 ENCOUNTER — HOSPITAL ENCOUNTER (OUTPATIENT)
Dept: PHYSICAL THERAPY | Age: 64
Setting detail: THERAPIES SERIES
Discharge: HOME | End: 2024-05-28
Payer: COMMERCIAL

## 2024-05-28 PROCEDURE — 97112 NEUROMUSCULAR REEDUCATION: CPT

## 2024-05-28 PROCEDURE — 97110 THERAPEUTIC EXERCISES: CPT

## 2024-05-28 NOTE — FLOWSHEET NOTE
[facility] - Outpatient Rehabilitation and Therapy [address], [place] [address] office: [phone] fax: [phone]  Physical Therapy: TREATMENT/PROGRESS NOTE   Patient: Serge Austin (63 y.o. male)   Treatment Date: 2024   :  1960 MRN: [id number]   Visit #:    POC:3/29 (+16 visits)  POC: 24  Insurance Allowable Auth Needed   As of : Auth not required for PT []Yes    [x]No    Insurance: Payor: BCBS / Plan: BCBS [place] PPO / Product Type: *No Product type* /   Insurance ID: [id number] - ([facility])  Secondary Insurance (if applicable): MEDICARE   Treatment Diagnosis:     ICD-10-CM    1. Decreased range of motion  M25.60       2. Gait abnormality  R26.9       3. Pain  R52       4. Stiffness due to immobility  M25.60     Z74.09       5. Weakness  R53.1       6. Balance disorder  R26.89          Medical Diagnosis:    M79.18 (ICD-10-CM) - Myalgia, other site   M96.1 (ICD-10-CM) - Postlaminectomy syndrome, not elsewhere classified  M54.2 (ICD-10-CM) - Cervicalgia  G89.29 (ICD-10-CM) - other chronic pain       Referring Physician: Dameon Ortiz MD  PCP: Kj Julien MD                             Plan of care signed (Y/N): sent 24    Date of Patient follow up with Physician: May 31 with [facility] for his blood sugar- Mekhi. May 14th has 3/3 infusions and then infusions move to every 2 months. Follow up with Dr. Ortiz 5/10 to discusses medications     Progress Report/POC: NO  1/3/23: POC  POC update due: (10 visits /OR AUTH LIMITS, whichever is less) visit 2024     Precautions/ Contra-indications:                                                                                          Latex allergy:  NO  Pacemaker:    NO  Contraindications for Manipulation: recent surgical history (relative), unhealthy/ multiple comorbidities , and remote history of spinal fusion (relative)  Date of Surgery: multiples  Other: Pt had a seizure mid 2024.

## 2024-06-04 ENCOUNTER — HOSPITAL ENCOUNTER (OUTPATIENT)
Dept: PHYSICAL THERAPY | Age: 64
Setting detail: THERAPIES SERIES
Discharge: HOME | End: 2024-06-04
Payer: COMMERCIAL

## 2024-06-04 PROCEDURE — 97530 THERAPEUTIC ACTIVITIES: CPT

## 2024-06-04 PROCEDURE — 97750 PHYSICAL PERFORMANCE TEST: CPT

## 2024-06-04 PROCEDURE — 97110 THERAPEUTIC EXERCISES: CPT

## 2024-06-04 ASSESSMENT — PAIN SCALES - QUEBEC BACK PAIN DISABILITY SCALE
PULL OR PUSH HEAVY DOORS: SOMEWHAT DIFFICULT
BEND OVER TO CLEAN THE BATHTUB: FAIRLY DIFFICULT
LIFT AND CARRY A HEAVY SUITCASE: SOMEWHAT DIFFICULT
TOTAL SCORE: 55
MAKE YOUR BED: FAIRLY DIFFICULT
GET OUT OF BED: SOMEWHAT DIFFICULT
TAKE FOOD OUT OF THE REFRIGERATOR: FAIRLY DIFFICULT
SIT IN A CHAIR FOR SEVERAL HOURS: VERY DIFFICULT
REACH UP TO HIGH SHELVES: FAIRLY DIFFICULT
WALK SEVERAL KILOMETERS  OR MILES: FAIRLY DIFFICULT
RIDE IN A CAR: SOMEWHAT DIFFICULT
CLIMB ONE FLIGHT OF STAIRS: FAIRLY DIFFICULT
SLEEP THROUGH THE NIGHT: UNABLE TO DO
MOVE A CHAIR: SOMEWHAT DIFFICULT
WALK A FEW BLOCKS OR 300 TO 400M: SOMEWHAT DIFFICULT
TURN OVER IN BED: SOMEWHAT DIFFICULT
PUT ON SOCKS OR PANYHOSE: SOMEWHAT DIFFICULT
CARRY TWO BAGS OF GROCERIES: SOMEWHAT DIFFICULT
STAND UP FOR 20 TO 30 MINUTES: SOMEWHAT DIFFICULT
RUN ONE BLOCK OR 100M: UNABLE TO DO
THROW A BALL: FAIRLY DIFFICULT

## 2024-06-04 NOTE — PLAN OF CARE
ROM, reduce/eliminate soft tissue swelling/inflammation/restriction, allowing for proper ROM, static and dynamic balance, and kinesthetic sense and proprioception. Next visit plan to progress weights, progress reps, add new exercises, and progress balance  initiate land therapy     Electronically Signed by Sai Cintron PT, DPT, CNS     Date: 06/04/2024       Note: If patient does not return for scheduled/recommended follow up visits, this note will serve as a discharge from care along with the most recent update on progress.

## 2024-06-07 ENCOUNTER — HOSPITAL ENCOUNTER (OUTPATIENT)
Dept: PHYSICAL THERAPY | Age: 64
Setting detail: THERAPIES SERIES
Discharge: HOME | End: 2024-06-07
Payer: COMMERCIAL

## 2024-06-07 PROCEDURE — 97110 THERAPEUTIC EXERCISES: CPT

## 2024-06-07 PROCEDURE — 97530 THERAPEUTIC ACTIVITIES: CPT

## 2024-06-07 PROCEDURE — 97112 NEUROMUSCULAR REEDUCATION: CPT

## 2024-06-07 NOTE — FLOWSHEET NOTE
[facility] - Outpatient Rehabilitation and Therapy [address], [place] [address] office: [phone] fax: [phone]  Physical Therapy Re-Certification Plan of Care    Dear Dameon Ortiz MD  ,    We had the pleasure of treating the following patient for physical therapy services at [facility] Outpatient Physical Therapy. A summary of our findings can be found in the updated assessment below.  This includes our plan of care.  If you have any questions or concerns regarding these findings, please do not hesitate to contact me at the office phone number checked above.  Thank you for the referral.     Physician Signature:________________________________Date:__________________  By signing above (or electronic signature), therapist's plan is approved by physician      Functional Outcome: [place]: 54  Serge Austin 1960 continues to present with functional deficits in ROM, joint mobility, strength symmetry, and endurance of strength  limiting ability with walking on uneven ground, managing community ambulation, walking up/down stairs, lifting items, and heavy home activity .  During therapy this date, patient required verbal cueing and progression of exercises and program for exercise progression, improving proper muscle recruitment and activation/motor control patterns, increasing ROM, static and dynamic balance, kinesthetic sense and proprioception, and improving postural awareness. Patient will continue to benefit from ongoing evaluation and advanced clinical decision from a Physical Therapist to improve muscle strength, normalization of gait, and balance and proprioception to safely return to PLOF without symptoms or restrictions.    Overall Response to Treatment:  Patient is responding well to treatment and improvement is noted with regards to goals and would continue to benefit from skilled therapy    Total Visits: 32     Recommendation:    [x] Continue PT 2x / wk for 4 weeks.

## 2024-06-11 ENCOUNTER — HOSPITAL ENCOUNTER (OUTPATIENT)
Dept: PHYSICAL THERAPY | Age: 64
Setting detail: THERAPIES SERIES
Discharge: HOME | End: 2024-06-11
Payer: COMMERCIAL

## 2024-06-11 PROCEDURE — 97112 NEUROMUSCULAR REEDUCATION: CPT

## 2024-06-11 PROCEDURE — 97110 THERAPEUTIC EXERCISES: CPT

## 2024-06-14 ENCOUNTER — HOSPITAL ENCOUNTER (OUTPATIENT)
Dept: PHYSICAL THERAPY | Age: 64
Setting detail: THERAPIES SERIES
Discharge: HOME | End: 2024-06-14
Payer: COMMERCIAL

## 2024-06-14 PROCEDURE — 97530 THERAPEUTIC ACTIVITIES: CPT

## 2024-06-14 PROCEDURE — 97110 THERAPEUTIC EXERCISES: CPT

## 2024-06-14 NOTE — FLOWSHEET NOTE
swelling/inflammation/restriction, allowing for proper ROM, static and dynamic balance, and kinesthetic sense and proprioception. Next visit plan to progress weights, progress reps, add new exercises, and progress balance  initiate land therapy     Electronically Signed by Sai Cintron PT, DPT, CNS     Date: 06/14/2024       Note: If patient does not return for scheduled/recommended follow up visits, this note will serve as a discharge from care along with the most recent update on progress.

## 2024-06-18 ENCOUNTER — APPOINTMENT (OUTPATIENT)
Dept: PHYSICAL THERAPY | Age: 64
End: 2024-06-18
Payer: COMMERCIAL

## 2024-06-21 ENCOUNTER — HOSPITAL ENCOUNTER (OUTPATIENT)
Dept: PHYSICAL THERAPY | Age: 64
Setting detail: THERAPIES SERIES
Discharge: HOME | End: 2024-06-21
Payer: COMMERCIAL

## 2024-06-21 PROCEDURE — 97112 NEUROMUSCULAR REEDUCATION: CPT

## 2024-06-21 PROCEDURE — 97110 THERAPEUTIC EXERCISES: CPT

## 2024-06-21 NOTE — FLOWSHEET NOTE
Forearm stretches for wrist flexion/extension  6/7: added to HEP   Half kneel 1/2 circles with BUE  6/11: challenged 2 moments unilateral UE use on bolster for support   SB bridges 6/11   Ritika pose  6/11          3 way kicks   6/14: added to HEP, inc challenged with RLE   Bicep curls                                    Manual Intervention (93221)  TIME                                        NMR re-education (04959) resistance Sets/time Reps CUES NEEDED   PPT progressions  PPT iso with SB  PPT with alt UE  PPT with alt LE  4/12    Inc time req d/t inc in B shoulder discomfort    Seated on SB tilts progressions  PPT  Lat tilt  Cw and cCW  March  Thoracic rotation   Alt flexion 4/15: no BUE use, fatigued      Inc time d/t difficulty with R hip hike activity , rest breaks req   Quadruped LE ext  4/12: v/c and t/c and inc in lumbar rotation    Cat/cow  Quadruped lat flexion  Quadruped firehydrant  Quadruped LE hikes   4/12   Quadruped PPT with abd iso with alt LE ext  4/17   PPT iso with SB bird dog       Seated on lainey disc  Marches  PPT with chest press  D2 with alt march  4/29   Cable column walk outs posterior and lateral       CC lateral step with palloff press 5/13: R hip drop with lateral steps towards L, LLE hyperextension, cueing to for slight knee bend  Will trial to R stepping npv d/c d/t low Blood sugar   NBOS  Modified tandem   NBOS EC 5/22   Shuttle balance  Step ups   Hip abd    Airex pad   Modified tandem - EO  Modified tandem - EC 6/7   SL balance 6/7   PPT iso with LE ext  dead bug 6/11   Cat/cow 6/11   Rows  Ext  D2 flexion  Side steps with palloff press Blue TB  Blue TB  Green TB  Blue TB 2  2  2  2 15  15  10  15 6/21   Quadruped exercises npv  Cat/cow  Alt UE  Thoracic rot  LE ext  firehydrant                     Contralat foot on shuttle balance  Alt UE flexion       Therapeutic Activity (64228)  Sets/time     Reassessed goals, discussed progress made and areas remaining for improvement,

## 2024-06-25 ENCOUNTER — HOSPITAL ENCOUNTER (OUTPATIENT)
Dept: PHYSICAL THERAPY | Age: 64
Setting detail: THERAPIES SERIES
Discharge: HOME | End: 2024-06-25
Payer: COMMERCIAL

## 2024-06-25 PROCEDURE — 97110 THERAPEUTIC EXERCISES: CPT

## 2024-06-25 PROCEDURE — 97112 NEUROMUSCULAR REEDUCATION: CPT

## 2024-06-25 NOTE — FLOWSHEET NOTE
Status: [x] Progressing: [] Met: [] Not Met: [] Adjusted - as of 6/4/24  3. New goal: Patient will be able to lift a 15# crate from ground to waist height 5x to demonstrate improved functional strength.                               Status: [] Progressing: [x] Met: [] Not Met: [] Adjusted  4. New goal: Patient will improve 5xSTS to 15 seconds or less to demonstrate decreased fall risk.    Status: [x] Progressing: [] Met: [] Not Met: [] Adjusted  - as of 6/4/24   New goal: patient will perform a floor transfer with independently and minimal cues in 5 minutes or less in order recover from a potential fall at home.                                                                     Status: [] Progressing: [x] Met: [] Not Met: [] Adjusted - able to complete without BUE in 27 seconds, and with BUE use on mat table in 30 seconds   6. Pt will demonstrate improvements in DGI balance by 3 pts (MCID) to demonstrate decrease fall risk and improvements in higher level balance activities needed to participate in community activities.   Status: [] Progressing: [] Met: [] Not Met: [x] Adjusted - added 6/4    Overall Progression Towards Functional goals/ Treatment Progress Update:  [] Patient is progressing as expected towards functional goals listed.    [] Progression is slowed due to complexities/Impairments listed.  [x] Progression has been slowed due to co-morbidities.  [] Plan just implemented, too soon (<30days) to assess goals progression   [] Goals require adjustment due to lack of progress  [] Patient is not progressing as expected and requires additional follow up with physician  [] Other:     CHARGE CAPTURE     PT CHARGE GRID   CPT Code (TIMED) minutes # CPT Code (UNTIMED) #     Therex (61344)  14 1  EVAL:HIGH (04354 - Typically 45 minutes face-to-face)     Neuromusc. Re-ed (68591) 25 2  Re-Eval (16731)     Manual (54525)    Estim Unattended (51986)     Ther. Act (52659)    Mech. Traction (17364)     Gait (17079)

## 2024-07-01 ENCOUNTER — HOSPITAL ENCOUNTER (OUTPATIENT)
Dept: PHYSICAL THERAPY | Age: 64
Setting detail: THERAPIES SERIES
Discharge: HOME | End: 2024-07-01
Payer: COMMERCIAL

## 2024-07-01 NOTE — FLOWSHEET NOTE
NBOS  Modified tandem   NBOS EC 5/22   Shuttle balance  Step ups   Hip abd    Airex pad   Modified tandem - EO  Modified tandem - EC 6/7   SL balance 6/7   PPT iso with LE ext  dead bug 6/11   Cat/cow 6/11   Rows  Ext  D2 flexion  Side steps with palloff press 6/21   Quadruped exercises npv  Cat/cow  Alt UE  Thoracic rot  LE ext  firehydrant    1      1  2   15      10B  10 6/25   Quadruped Bear hold  1-2 seconds x5 6/25: d/c d/t knee discomfort   Qudaruped hip hike  1 10 6/25; t.c and v/c provided    Contralat foot on shuttle balance  Alt UE flexion       Therapeutic Activity (55534)  Sets/time     Reassessed goals, discussed progress made and areas remaining for improvement, issued outcome measure and reviewed new score with pt as compared to ruperto Waldronec, edu about safe lifting, dec B knee valgus with squat, dec twisting when lifting     5/1   MMT as stated above   3/29   Physical performance test:   Reassessed goals, discussed progress made and areas remaining for improvement, issued outcome measure and reviewed new score with pt as compared to [facility], 5xSTS     3/29   Step taps  Step ups 4/29   Half kneel holds  5/1   half kneel to tall kneel transitions    5/7   Heel sit to tall kneel  5/7   Step ups   Step up knee up 5/22: finger tip support   6/4   Stoop and recover for cone, 1/2 foam roll and foam roll    6.4   Reassessed goals, discussed progress made and areas remaining for improvement, issued outcome measure and reviewed new score with pt as compared to ruperto HOLT, 5xSTS, 30\"STS   6/4   Contralat foot on 6 inch step thoracic rotation   6/7: challenged with LLE  on step   Edu about quarter turns to improve balance and stability  X5 min  6/25     AquaticTherapy Dates of Service: 12/5, 12/11, 12/18, 1/8,  1/10, 1/15, 1/17, 2/26, 3/4, 3/11, 3/15, 3/22, 3/25  Aquatic Visits Exercises/Activities:   Transfers:  [x] Stairs   [] Ramp  [] Chair Lift   % Immersion: Deep end - 4.5'

## 2024-07-03 ENCOUNTER — HOSPITAL ENCOUNTER (OUTPATIENT)
Dept: PHYSICAL THERAPY | Age: 64
Setting detail: THERAPIES SERIES
Discharge: HOME | End: 2024-07-03
Payer: COMMERCIAL

## 2024-07-03 PROCEDURE — 97110 THERAPEUTIC EXERCISES: CPT

## 2024-07-03 PROCEDURE — 97530 THERAPEUTIC ACTIVITIES: CPT

## 2024-07-03 ASSESSMENT — PAIN SCALES - QUEBEC BACK PAIN DISABILITY SCALE
RUN ONE BLOCK OR 100M: VERY DIFFICULT
WALK A FEW BLOCKS OR 300 TO 400M: MINIMALLY DIFFICULT
SIT IN A CHAIR FOR SEVERAL HOURS: SOMEWHAT DIFFICULT
SLEEP THROUGH THE NIGHT: FAIRLY DIFFICULT
MOVE A CHAIR: SOMEWHAT DIFFICULT
MAKE YOUR BED: SOMEWHAT DIFFICULT
WALK SEVERAL KILOMETERS  OR MILES: SOMEWHAT DIFFICULT
PULL OR PUSH HEAVY DOORS: SOMEWHAT DIFFICULT
STAND UP FOR 20 TO 30 MINUTES: SOMEWHAT DIFFICULT
CLIMB ONE FLIGHT OF STAIRS: SOMEWHAT DIFFICULT
CARRY TWO BAGS OF GROCERIES: SOMEWHAT DIFFICULT
TOTAL SCORE: 41
PUT ON SOCKS OR PANYHOSE: SOMEWHAT DIFFICULT
BEND OVER TO CLEAN THE BATHTUB: FAIRLY DIFFICULT
TURN OVER IN BED: SOMEWHAT DIFFICULT
TAKE FOOD OUT OF THE REFRIGERATOR: MINIMALLY DIFFICULT
REACH UP TO HIGH SHELVES: SOMEWHAT DIFFICULT
LIFT AND CARRY A HEAVY SUITCASE: SOMEWHAT DIFFICULT
RIDE IN A CAR: MINIMALLY DIFFICULT
GET OUT OF BED: SOMEWHAT DIFFICULT
THROW A BALL: SOMEWHAT DIFFICULT

## 2024-07-03 NOTE — FLOWSHEET NOTE
[facility] - Outpatient Rehabilitation and Therapy [address], [place] [address] office: [phone] fax: [phone]   Physical Therapy Discharge Summary    Dear Dameon Ortiz MD  ,    We had the pleasure of treating the following patient for physical therapy services at [facility] Outpatient Physical Therapy.  A summary of our findings can be found in the discharge summary below.  If you have any questions or concerns regarding these findings, please do not hesitate to contact me at the office phone number checked above.  Thank you for the referral.       Functional Outcome: [place] Back Pain Disability Scale: 41 % disability    Total Visits: 37     Plan of Care: Discharge from Physical Therapy    Reason for Discharge:   Serge has met 5/7 goals. provided with road to wellness pass. Education provided about proper set up and use of equipment to ensure safety and decrease injury risk. Pt discharged with updated land and aquatic exercises to continue to improve strength and functional mobility.           Physical Therapy: TREATMENT/PROGRESS NOTE   Patient: Serge Austin (64 y.o. male)   Treatment Date: 2024   :  1960 MRN: [id number]   Visit #: 37  39   POC:3/29 (+16 visits)  POC: 24  POC: (+3 visits)  Insurance Allowable Auth Needed   As of : Auth not required for PT []Yes    [x]No    Insurance: Payor: BCBS / Plan: BCBS - [place] PPO / Product Type: *No Product type* /   Insurance ID: [id number] - ([facility])  Secondary Insurance (if applicable): MEDICARE   Treatment Diagnosis:     ICD-10-CM    1. Decreased range of motion  M25.60       2. Gait abnormality  R26.9       3. Pain  R52       4. Stiffness due to immobility  M25.60     Z74.09       5. Weakness  R53.1       6. Balance disorder  R26.89          Medical Diagnosis:    M79.18 (ICD-10-CM) - Myalgia, other site   M96.1 (ICD-10-CM) - Postlaminectomy syndrome, not elsewhere classified  M54.2 (ICD-10-CM) -

## 2024-07-09 ENCOUNTER — OFFICE VISIT (OUTPATIENT)
Dept: PULMONOLOGY | Age: 64
End: 2024-07-09
Payer: COMMERCIAL

## 2024-07-09 VITALS
WEIGHT: 194 LBS | SYSTOLIC BLOOD PRESSURE: 124 MMHG | HEIGHT: 73 IN | HEART RATE: 74 BPM | BODY MASS INDEX: 25.71 KG/M2 | OXYGEN SATURATION: 98 % | DIASTOLIC BLOOD PRESSURE: 76 MMHG

## 2024-07-09 DIAGNOSIS — J43.9 BULLOUS EMPHYSEMA (HCC): Primary | ICD-10-CM

## 2024-07-09 DIAGNOSIS — Z87.09 HISTORY OF PNEUMOTHORAX: ICD-10-CM

## 2024-07-09 PROCEDURE — G8427 DOCREV CUR MEDS BY ELIG CLIN: HCPCS | Performed by: INTERNAL MEDICINE

## 2024-07-09 PROCEDURE — 3074F SYST BP LT 130 MM HG: CPT | Performed by: INTERNAL MEDICINE

## 2024-07-09 PROCEDURE — 3023F SPIROM DOC REV: CPT | Performed by: INTERNAL MEDICINE

## 2024-07-09 PROCEDURE — 3078F DIAST BP <80 MM HG: CPT | Performed by: INTERNAL MEDICINE

## 2024-07-09 PROCEDURE — 1036F TOBACCO NON-USER: CPT | Performed by: INTERNAL MEDICINE

## 2024-07-09 PROCEDURE — G8419 CALC BMI OUT NRM PARAM NOF/U: HCPCS | Performed by: INTERNAL MEDICINE

## 2024-07-09 PROCEDURE — 1123F ACP DISCUSS/DSCN MKR DOCD: CPT | Performed by: INTERNAL MEDICINE

## 2024-07-09 PROCEDURE — 3017F COLORECTAL CA SCREEN DOC REV: CPT | Performed by: INTERNAL MEDICINE

## 2024-07-09 PROCEDURE — 99214 OFFICE O/P EST MOD 30 MIN: CPT | Performed by: INTERNAL MEDICINE

## 2024-07-09 RX ORDER — ALBUTEROL SULFATE 90 UG/1
2 AEROSOL, METERED RESPIRATORY (INHALATION) 4 TIMES DAILY PRN
Qty: 54 G | Refills: 2 | Status: SHIPPED | OUTPATIENT
Start: 2024-07-09

## 2024-07-09 RX ORDER — FLUTICASONE FUROATE, UMECLIDINIUM BROMIDE AND VILANTEROL TRIFENATATE 100; 62.5; 25 UG/1; UG/1; UG/1
1 POWDER RESPIRATORY (INHALATION) DAILY
Qty: 180 EACH | Refills: 3 | Status: SHIPPED | OUTPATIENT
Start: 2024-07-09

## 2024-07-09 ASSESSMENT — ENCOUNTER SYMPTOMS
CONSTIPATION: 0
VOMITING: 0
BACK PAIN: 0
COLOR CHANGE: 0
BLOOD IN STOOL: 0
DIARRHEA: 0
SHORTNESS OF BREATH: 1
APNEA: 0
ABDOMINAL DISTENTION: 0
VOICE CHANGE: 0
RHINORRHEA: 0
SINUS PRESSURE: 0
ABDOMINAL PAIN: 0
WHEEZING: 0
COUGH: 0
CHOKING: 0
STRIDOR: 0
SORE THROAT: 0
CHEST TIGHTNESS: 0

## 2024-07-09 NOTE — PROGRESS NOTES
Serge Austin    YOB: 1960     Date of Service:  7/9/2024     Chief Complaint   Patient presents with    3 Month Follow-Up    COPD       HPI patient is doing well, minimally symptomatic.  Feels that Trelegy 100 has longer effect on his breathing than Symbicort.  He has been pretty active in the choir and also has been swimming/diving.    Allergies   Allergen Reactions    Metformin Other (See Comments)     GI upset    Shellfish-Derived Products Hives    Simvastatin Other (See Comments)     GI upset     Outpatient Medications Marked as Taking for the 7/9/24 encounter (Office Visit) with Leandro Roman MD   Medication Sig Dispense Refill    albuterol sulfate HFA (VENTOLIN HFA) 108 (90 Base) MCG/ACT inhaler Inhale 2 puffs into the lungs 4 times daily as needed for Wheezing 54 g 2    fluticasone-umeclidin-vilant (TRELEGY ELLIPTA) 100-62.5-25 MCG/ACT AEPB inhaler Inhale 1 puff into the lungs daily 180 each 3    folic acid (FOLVITE) 1 MG tablet Take 1 tablet by mouth in the morning and at bedtime      amLODIPine (NORVASC) 10 MG tablet Take 1 tablet by mouth daily      hydroxychloroquine (PLAQUENIL) 200 MG tablet Take 1 tablet by mouth 2 times daily      valsartan (DIOVAN) 320 MG tablet Take 1 tablet by mouth daily      tadalafil (CIALIS) 20 MG tablet SMARTSIG:Tablet(s) By Mouth      tamsulosin (FLOMAX) 0.4 MG capsule Take 1 capsule by mouth daily      pravastatin (PRAVACHOL) 20 MG tablet Take 1 tablet by mouth daily      LINZESS 290 MCG CAPS capsule every morning (before breakfast)       acetaminophen (TYLENOL 8 HOUR ARTHRITIS PAIN) 650 MG extended release tablet Take 1 tablet by mouth every 8 hours as needed for Pain         Immunization History   Administered Date(s) Administered    COVID-19, PFIZER Bivalent, DO NOT Dilute, (age 12y+), IM, 30 mcg/0.3 mL 10/04/2022    COVID-19, PFIZER GRAY top, DO NOT Dilute, (age 12 y+), IM, 30 mcg/0.3 mL 04/21/2022    COVID-19, PFIZER PURPLE top, DILUTE for

## 2024-09-30 ENCOUNTER — TELEPHONE (OUTPATIENT)
Dept: ADMINISTRATIVE | Age: 64
End: 2024-09-30

## 2024-09-30 NOTE — TELEPHONE ENCOUNTER
Submitted PA for TRELEGY  Via [facility] Key: [id number]  STATUS:  Available without authorization.     Follow up done daily; if no decision with in three days we will refax.  If another three days goes by with no decision will call the insurance for status.

## 2024-10-29 ENCOUNTER — OFFICE VISIT (OUTPATIENT)
Dept: PULMONOLOGY | Age: 64
End: 2024-10-29
Payer: COMMERCIAL

## 2024-10-29 ENCOUNTER — HOSPITAL ENCOUNTER (OUTPATIENT)
Age: 64
Discharge: HOME | End: 2024-10-29
Payer: COMMERCIAL

## 2024-10-29 ENCOUNTER — HOSPITAL ENCOUNTER (OUTPATIENT)
Dept: GENERAL RADIOLOGY | Age: 64
Discharge: HOME | End: 2024-10-29
Payer: COMMERCIAL

## 2024-10-29 VITALS
OXYGEN SATURATION: 95 % | BODY MASS INDEX: 27.7 KG/M2 | HEART RATE: 78 BPM | WEIGHT: 209 LBS | DIASTOLIC BLOOD PRESSURE: 72 MMHG | SYSTOLIC BLOOD PRESSURE: 116 MMHG | HEIGHT: 73 IN

## 2024-10-29 DIAGNOSIS — R06.09 DOE (DYSPNEA ON EXERTION): ICD-10-CM

## 2024-10-29 DIAGNOSIS — J44.9 COPD, SEVERE (HCC): Primary | ICD-10-CM

## 2024-10-29 PROCEDURE — G8419 CALC BMI OUT NRM PARAM NOF/U: HCPCS | Performed by: INTERNAL MEDICINE

## 2024-10-29 PROCEDURE — 99214 OFFICE O/P EST MOD 30 MIN: CPT | Performed by: INTERNAL MEDICINE

## 2024-10-29 PROCEDURE — G8484 FLU IMMUNIZE NO ADMIN: HCPCS | Performed by: INTERNAL MEDICINE

## 2024-10-29 PROCEDURE — 1123F ACP DISCUSS/DSCN MKR DOCD: CPT | Performed by: INTERNAL MEDICINE

## 2024-10-29 PROCEDURE — 3078F DIAST BP <80 MM HG: CPT | Performed by: INTERNAL MEDICINE

## 2024-10-29 PROCEDURE — 3017F COLORECTAL CA SCREEN DOC REV: CPT | Performed by: INTERNAL MEDICINE

## 2024-10-29 PROCEDURE — 1036F TOBACCO NON-USER: CPT | Performed by: INTERNAL MEDICINE

## 2024-10-29 PROCEDURE — 3074F SYST BP LT 130 MM HG: CPT | Performed by: INTERNAL MEDICINE

## 2024-10-29 PROCEDURE — 71046 X-RAY EXAM CHEST 2 VIEWS: CPT

## 2024-10-29 PROCEDURE — G8427 DOCREV CUR MEDS BY ELIG CLIN: HCPCS | Performed by: INTERNAL MEDICINE

## 2024-10-29 PROCEDURE — 3023F SPIROM DOC REV: CPT | Performed by: INTERNAL MEDICINE

## 2024-10-29 RX ORDER — ALBUTEROL SULFATE 2.5 MG/.5ML
2.5 SOLUTION RESPIRATORY (INHALATION) EVERY 6 HOURS PRN
Qty: 120 ML | Refills: 3 | Status: SHIPPED | OUTPATIENT
Start: 2024-10-29 | End: 2024-11-01

## 2024-10-29 ASSESSMENT — ENCOUNTER SYMPTOMS
SHORTNESS OF BREATH: 1
SORE THROAT: 0
DIARRHEA: 0
BLOOD IN STOOL: 0
CONSTIPATION: 0
STRIDOR: 0
ABDOMINAL PAIN: 0
RHINORRHEA: 0
WHEEZING: 0
SINUS PRESSURE: 0
COUGH: 0
VOMITING: 0
COLOR CHANGE: 0
VOICE CHANGE: 0
ABDOMINAL DISTENTION: 0
APNEA: 0
CHOKING: 0
CHEST TIGHTNESS: 0
BACK PAIN: 0

## 2024-10-29 NOTE — PROGRESS NOTES
Problem Relation Age of Onset    Arthritis Mother     Stroke Father     Diabetes Maternal Aunt     Diabetes Maternal Uncle     Diabetes Maternal Grandfather        Review of Systems:  Review of Systems   Constitutional:  Positive for fatigue. Negative for activity change, appetite change, fever and unexpected weight change.   HENT:  Negative for congestion, ear discharge, ear pain, postnasal drip, rhinorrhea, sinus pressure, sneezing, sore throat, tinnitus and voice change.    Respiratory:  Positive for shortness of breath. Negative for apnea, cough, choking, chest tightness, wheezing and stridor.    Cardiovascular:  Negative for chest pain, palpitations and leg swelling.   Gastrointestinal:  Negative for abdominal distention, abdominal pain, blood in stool, constipation, diarrhea and vomiting.   Musculoskeletal:  Negative for arthralgias, back pain and gait problem.   Skin:  Negative for color change, pallor and rash.   Allergic/Immunologic: Negative for environmental allergies.   Neurological:  Negative for dizziness, tremors, seizures, syncope, speech difficulty, weakness, light-headedness, numbness and headaches.   Hematological:  Negative for adenopathy. Does not bruise/bleed easily.   Psychiatric/Behavioral:  Negative for sleep disturbance.        Vitals:    10/29/24 1454   BP: 116/72   Site: Left Upper Arm   Position: Sitting   Cuff Size: Large Adult   Pulse: 78   SpO2: 95%   Weight: 94.8 kg (209 lb)   Height: 1.854 m (6' 1\")          No data to display               Body mass index is 27.57 kg/m².     Wt Readings from Last 3 Encounters:   10/29/24 94.8 kg (209 lb)   07/09/24 88 kg (194 lb)   04/04/24 86.2 kg (190 lb)     BP Readings from Last 3 Encounters:   10/29/24 116/72   07/09/24 124/76   04/04/24 128/70         Physical Exam  Constitutional:       General: He is not in acute distress.     Appearance: He is well-developed. He is not ill-appearing or diaphoretic.   HENT:      Nose: No congestion or  Cheek Interpolation Flap Division And Inset Text: Division and inset of the cheek interpolation flap was performed to achieve optimal aesthetic result, restore normal anatomic appearance and avoid distortion of normal anatomy, expedite and facilitate wound healing, achieve optimal functional result and because linear closure either not possible or would produce suboptimal result. The patient was prepped and draped in the usual manner. The pedicle was infiltrated with local anesthesia. The pedicle was sectioned with a #15 blade. The pedicle was de-bulked and trimmed to match the shape of the defect. Hemostasis was achieved. The flap donor site and free margin of the flap were secured with deep buried sutures and the wound edges were re-approximated.

## 2024-11-01 DIAGNOSIS — J44.9 COPD, SEVERE (HCC): ICD-10-CM

## 2024-11-01 RX ORDER — ALBUTEROL SULFATE 0.83 MG/ML
2.5 SOLUTION RESPIRATORY (INHALATION) EVERY 4 HOURS PRN
Qty: 180 ML | Refills: 3 | Status: SHIPPED | OUTPATIENT
Start: 2024-11-01

## 2024-11-15 ENCOUNTER — OFFICE VISIT (OUTPATIENT)
Dept: PULMONOLOGY | Age: 64
End: 2024-11-15
Payer: COMMERCIAL

## 2024-11-15 VITALS
SYSTOLIC BLOOD PRESSURE: 122 MMHG | BODY MASS INDEX: 27.7 KG/M2 | WEIGHT: 209 LBS | HEIGHT: 73 IN | OXYGEN SATURATION: 94 % | DIASTOLIC BLOOD PRESSURE: 78 MMHG | HEART RATE: 67 BPM

## 2024-11-15 DIAGNOSIS — Z87.09 HISTORY OF PNEUMOTHORAX: ICD-10-CM

## 2024-11-15 DIAGNOSIS — J44.9 COPD, SEVERE (HCC): Primary | ICD-10-CM

## 2024-11-15 PROCEDURE — G8427 DOCREV CUR MEDS BY ELIG CLIN: HCPCS | Performed by: INTERNAL MEDICINE

## 2024-11-15 PROCEDURE — G8419 CALC BMI OUT NRM PARAM NOF/U: HCPCS | Performed by: INTERNAL MEDICINE

## 2024-11-15 PROCEDURE — 3023F SPIROM DOC REV: CPT | Performed by: INTERNAL MEDICINE

## 2024-11-15 PROCEDURE — G8484 FLU IMMUNIZE NO ADMIN: HCPCS | Performed by: INTERNAL MEDICINE

## 2024-11-15 PROCEDURE — 3078F DIAST BP <80 MM HG: CPT | Performed by: INTERNAL MEDICINE

## 2024-11-15 PROCEDURE — 1036F TOBACCO NON-USER: CPT | Performed by: INTERNAL MEDICINE

## 2024-11-15 PROCEDURE — 3017F COLORECTAL CA SCREEN DOC REV: CPT | Performed by: INTERNAL MEDICINE

## 2024-11-15 PROCEDURE — 1123F ACP DISCUSS/DSCN MKR DOCD: CPT | Performed by: INTERNAL MEDICINE

## 2024-11-15 PROCEDURE — 99214 OFFICE O/P EST MOD 30 MIN: CPT | Performed by: INTERNAL MEDICINE

## 2024-11-15 PROCEDURE — 3074F SYST BP LT 130 MM HG: CPT | Performed by: INTERNAL MEDICINE

## 2024-11-15 ASSESSMENT — ENCOUNTER SYMPTOMS
SORE THROAT: 0
ABDOMINAL PAIN: 0
VOMITING: 0
APNEA: 0
WHEEZING: 0
CHOKING: 0
COUGH: 0
DIARRHEA: 0
RHINORRHEA: 0
COLOR CHANGE: 0
ABDOMINAL DISTENTION: 0
VOICE CHANGE: 0
CHEST TIGHTNESS: 0
STRIDOR: 0
CONSTIPATION: 0
BLOOD IN STOOL: 0
SINUS PRESSURE: 0
SHORTNESS OF BREATH: 1
BACK PAIN: 0

## 2024-11-15 NOTE — PROGRESS NOTES
Serge Austin    YOB: 1960     Date of Service:  11/15/2024     Chief Complaint   Patient presents with    1 Month Follow-Up     CXR    COPD       HPI patient presented to ER at  on 11/8 with COPD exacerbation, improved symptoms with Z-Iván and prednisone taper.  He currently denies any wheezing or cough.  Improved exercise tolerance.  Denies any chest pain or tightness.    Allergies   Allergen Reactions    Metformin Other (See Comments)     GI upset    Shellfish-Derived Products Hives    Simvastatin Other (See Comments)     GI upset     Outpatient Medications Marked as Taking for the 11/15/24 encounter (Office Visit) with Leandro Roman MD   Medication Sig Dispense Refill    albuterol (PROVENTIL) (2.5 MG/3ML) 0.083% nebulizer solution Take 3 mLs by nebulization every 4 hours as needed for Wheezing 180 mL 3    albuterol sulfate HFA (VENTOLIN HFA) 108 (90 Base) MCG/ACT inhaler Inhale 2 puffs into the lungs 4 times daily as needed for Wheezing 54 g 2    fluticasone-umeclidin-vilant (TRELEGY ELLIPTA) 100-62.5-25 MCG/ACT AEPB inhaler Inhale 1 puff into the lungs daily 180 each 3    folic acid (FOLVITE) 1 MG tablet Take 1 tablet by mouth in the morning and at bedtime      amLODIPine (NORVASC) 10 MG tablet Take 1 tablet by mouth daily      hydroxychloroquine (PLAQUENIL) 200 MG tablet Take 1 tablet by mouth 2 times daily      valsartan (DIOVAN) 320 MG tablet Take 1 tablet by mouth daily      tadalafil (CIALIS) 20 MG tablet SMARTSIG:Tablet(s) By Mouth      tamsulosin (FLOMAX) 0.4 MG capsule Take 1 capsule by mouth daily      sildenafil (REVATIO) 20 MG tablet Take 1 tablet by mouth as needed      pravastatin (PRAVACHOL) 20 MG tablet Take 1 tablet by mouth daily      LINZESS 290 MCG CAPS capsule every morning (before breakfast)       acetaminophen (TYLENOL 8 HOUR ARTHRITIS PAIN) 650 MG extended release tablet Take 1 tablet by mouth every 8 hours as needed for Pain         Immunization History

## 2024-11-25 ENCOUNTER — TELEPHONE (OUTPATIENT)
Dept: CARDIAC REHAB | Age: 64
End: 2024-11-25

## 2024-11-25 NOTE — TELEPHONE ENCOUNTER
Left message with patient's wife, reminding of Mr. Austin's   Pulmonary rehab appointment at 1pm on 11/26/2024

## 2024-11-26 ENCOUNTER — HOSPITAL ENCOUNTER (OUTPATIENT)
Dept: CARDIAC REHAB | Age: 64
Setting detail: THERAPIES SERIES
Discharge: HOME | End: 2024-11-26
Payer: COMMERCIAL

## 2024-11-26 VITALS
WEIGHT: 211.4 LBS | HEART RATE: 77 BPM | OXYGEN SATURATION: 94 % | RESPIRATION RATE: 16 BRPM | HEIGHT: 73 IN | BODY MASS INDEX: 28.02 KG/M2 | DIASTOLIC BLOOD PRESSURE: 76 MMHG | SYSTOLIC BLOOD PRESSURE: 140 MMHG

## 2024-11-26 PROCEDURE — 94625 PHY/QHP OP PULM RHB W/O MNTR: CPT

## 2024-11-26 RX ORDER — METHOTREXATE 2.5 MG/1
2.5 TABLET ORAL WEEKLY
COMMUNITY
Start: 2024-11-15

## 2024-11-26 RX ORDER — LEVETIRACETAM 750 MG/1
750 TABLET ORAL 2 TIMES DAILY
COMMUNITY

## 2024-11-26 RX ORDER — INFLIXIMAB 100 MG/10ML
5 INJECTION, POWDER, LYOPHILIZED, FOR SOLUTION INTRAVENOUS SEE ADMIN INSTRUCTIONS
COMMUNITY

## 2024-11-26 RX ORDER — LORATADINE PSEUDOEPHEDRINE SULFATE 10; 240 MG/1; MG/1
1 TABLET, EXTENDED RELEASE ORAL DAILY
COMMUNITY

## 2024-11-26 RX ORDER — BACLOFEN 10 MG/1
10 TABLET ORAL 2 TIMES DAILY PRN
COMMUNITY

## 2024-11-26 RX ORDER — PREDNISONE 2.5 MG/1
2.5 TABLET ORAL DAILY PRN
COMMUNITY

## 2024-11-26 ASSESSMENT — PATIENT HEALTH QUESTIONNAIRE - PHQ9
9. THOUGHTS THAT YOU WOULD BE BETTER OFF DEAD, OR OF HURTING YOURSELF: NOT AT ALL
SUM OF ALL RESPONSES TO PHQ QUESTIONS 1-9: 9
3. TROUBLE FALLING OR STAYING ASLEEP: NEARLY EVERY DAY
6. FEELING BAD ABOUT YOURSELF - OR THAT YOU ARE A FAILURE OR HAVE LET YOURSELF OR YOUR FAMILY DOWN: NOT AT ALL
SUM OF ALL RESPONSES TO PHQ QUESTIONS 1-9: 9
SUM OF ALL RESPONSES TO PHQ QUESTIONS 1-9: 9
5. POOR APPETITE OR OVEREATING: MORE THAN HALF THE DAYS
4. FEELING TIRED OR HAVING LITTLE ENERGY: SEVERAL DAYS
10. IF YOU CHECKED OFF ANY PROBLEMS, HOW DIFFICULT HAVE THESE PROBLEMS MADE IT FOR YOU TO DO YOUR WORK, TAKE CARE OF THINGS AT HOME, OR GET ALONG WITH OTHER PEOPLE: SOMEWHAT DIFFICULT
2. FEELING DOWN, DEPRESSED OR HOPELESS: NOT AT ALL
SUM OF ALL RESPONSES TO PHQ QUESTIONS 1-9: 9
1. LITTLE INTEREST OR PLEASURE IN DOING THINGS: SEVERAL DAYS
7. TROUBLE CONCENTRATING ON THINGS, SUCH AS READING THE NEWSPAPER OR WATCHING TELEVISION: SEVERAL DAYS
SUM OF ALL RESPONSES TO PHQ9 QUESTIONS 1 & 2: 1
8. MOVING OR SPEAKING SO SLOWLY THAT OTHER PEOPLE COULD HAVE NOTICED. OR THE OPPOSITE, BEING SO FIGETY OR RESTLESS THAT YOU HAVE BEEN MOVING AROUND A LOT MORE THAN USUAL: SEVERAL DAYS

## 2024-11-26 ASSESSMENT — PAIN SCALES - GENERAL: PAINLEVEL_OUTOF10: 0

## 2024-11-26 NOTE — CARDIO/PULMONARY
[facility] Cardiac Rehabilitation Initial Evaluation    Serge Austin       1960     [id number]    Share medical information:  Yes Saray (spouse) [phone]     Pulmonary History    COPD    Physical Assessment     General Appearance   Height:  185.4 cm (6' 1\")  Weight:  95.9 kg (211 lb 6.4 oz)   BMI:  27.9  Skin color:     warm, dry, intact, appropriate for ethnicity     Cardiovascular Assessment  BP Sitting:  (!) 140/76  Sittin/74  Standin/84  Heart rate:   77 Monitor   Heart sounds:   Regular S1, S2    Respiratory Assessment  Resp rate: 16                  SpO2:  94 %  Quality/Effort:    regular, easy  Sleep Apnea:  No  CPAP  No  Oxygen  No     Sleeping Habits:  Sleeps on average 4 hours per night, mucous builds up overnight in lungs. Sleeping on right side is better than left.   Cough:  No, not often  Wheezing:  No but reports some with exertion  Chest discomfort:  Yes chest tightness with exertion at times.    Edema:  Yes in some joints due to AS.      Orthopedic/Exercise Limitations:  No    Pain:   Do you have pain?:  no       Fall Risk Assessment     History of falling with or without injury: No  Use of ambulatory aid: No  Difficulty walking/impaired gait: No  Numbness in feet: No  Vision changes: No  Dizziness: No  Shortness of breath: Yes (when carrying something heavy or walking up stairs)  Current medications include but not limited to: Seizure medication, ACE, ARB, Beta  Blocker  Other fall risk : No  Outpatient fall risk intervention strategies: None of these strategies apply    Abuse / Neglect  Physical/behavioral signs of abuse/neglect   No    Do you feel safe at home   Yes    Advanced Directives  Patient has Advanced Directives:  No  Patient given Advanced Directive pack:  Not interested    Vaccinations  Influenza (annual):  Yes  Pneumonia:  Yes    Pt here for first session of Pulmonary Rehab.Reviewed and discussed insurance benefits, pt v/u.  Reviewed

## 2024-12-02 ENCOUNTER — HOSPITAL ENCOUNTER (OUTPATIENT)
Dept: CARDIAC REHAB | Age: 64
Setting detail: THERAPIES SERIES
Discharge: HOME | End: 2024-12-02
Payer: COMMERCIAL

## 2024-12-02 PROCEDURE — 94625 PHY/QHP OP PULM RHB W/O MNTR: CPT

## 2024-12-04 ENCOUNTER — HOSPITAL ENCOUNTER (OUTPATIENT)
Dept: CARDIAC REHAB | Age: 64
Setting detail: THERAPIES SERIES
Discharge: HOME | End: 2024-12-04
Payer: COMMERCIAL

## 2024-12-04 PROCEDURE — 94625 PHY/QHP OP PULM RHB W/O MNTR: CPT

## 2024-12-06 ENCOUNTER — HOSPITAL ENCOUNTER (OUTPATIENT)
Dept: CARDIAC REHAB | Age: 64
Setting detail: THERAPIES SERIES
Discharge: HOME | End: 2024-12-06
Payer: COMMERCIAL

## 2024-12-06 PROCEDURE — 94625 PHY/QHP OP PULM RHB W/O MNTR: CPT

## 2024-12-09 ENCOUNTER — HOSPITAL ENCOUNTER (OUTPATIENT)
Dept: CARDIAC REHAB | Age: 64
Setting detail: THERAPIES SERIES
Discharge: HOME | End: 2024-12-09
Payer: COMMERCIAL

## 2024-12-09 PROCEDURE — 94625 PHY/QHP OP PULM RHB W/O MNTR: CPT

## 2024-12-11 ENCOUNTER — HOSPITAL ENCOUNTER (OUTPATIENT)
Dept: CARDIAC REHAB | Age: 64
Setting detail: THERAPIES SERIES
Discharge: HOME | End: 2024-12-11
Payer: COMMERCIAL

## 2024-12-11 PROCEDURE — 94625 PHY/QHP OP PULM RHB W/O MNTR: CPT

## 2024-12-13 ENCOUNTER — HOSPITAL ENCOUNTER (OUTPATIENT)
Dept: CARDIAC REHAB | Age: 64
Setting detail: THERAPIES SERIES
Discharge: HOME | End: 2024-12-13
Payer: COMMERCIAL

## 2024-12-13 PROCEDURE — 94625 PHY/QHP OP PULM RHB W/O MNTR: CPT

## 2024-12-16 ENCOUNTER — HOSPITAL ENCOUNTER (OUTPATIENT)
Dept: CARDIAC REHAB | Age: 64
Setting detail: THERAPIES SERIES
Discharge: HOME | End: 2024-12-16
Payer: COMMERCIAL

## 2024-12-16 PROCEDURE — 94625 PHY/QHP OP PULM RHB W/O MNTR: CPT

## 2024-12-18 ENCOUNTER — HOSPITAL ENCOUNTER (OUTPATIENT)
Dept: CARDIAC REHAB | Age: 64
Setting detail: THERAPIES SERIES
Discharge: HOME | End: 2024-12-18
Payer: COMMERCIAL

## 2024-12-18 PROCEDURE — 94625 PHY/QHP OP PULM RHB W/O MNTR: CPT

## 2024-12-20 ENCOUNTER — HOSPITAL ENCOUNTER (OUTPATIENT)
Dept: CARDIAC REHAB | Age: 64
Setting detail: THERAPIES SERIES
Discharge: HOME | End: 2024-12-20
Payer: COMMERCIAL

## 2024-12-20 PROCEDURE — 94625 PHY/QHP OP PULM RHB W/O MNTR: CPT

## 2024-12-23 ENCOUNTER — HOSPITAL ENCOUNTER (OUTPATIENT)
Dept: CARDIAC REHAB | Age: 64
Setting detail: THERAPIES SERIES
Discharge: HOME | End: 2024-12-23
Payer: COMMERCIAL

## 2024-12-23 PROCEDURE — 94625 PHY/QHP OP PULM RHB W/O MNTR: CPT

## 2024-12-27 ENCOUNTER — TELEPHONE (OUTPATIENT)
Dept: ADMINISTRATIVE | Age: 64
End: 2024-12-27

## 2024-12-27 ENCOUNTER — HOSPITAL ENCOUNTER (OUTPATIENT)
Dept: CARDIAC REHAB | Age: 64
Setting detail: THERAPIES SERIES
Discharge: HOME | End: 2024-12-27
Payer: COMMERCIAL

## 2024-12-27 PROCEDURE — 94625 PHY/QHP OP PULM RHB W/O MNTR: CPT

## 2024-12-27 NOTE — TELEPHONE ENCOUNTER
Submitted PA for Arnold Dobbs 100-62.5-25MCG/ACT aerosol powder   [facility]ia [facility] Key: [id number]  STATUS: Available without authorization.     Follow up done daily; if no decision with in three days we will refax.  If another three days goes by with no decision will call the insurance for status.

## 2024-12-30 ENCOUNTER — HOSPITAL ENCOUNTER (OUTPATIENT)
Dept: CARDIAC REHAB | Age: 64
Setting detail: THERAPIES SERIES
Discharge: HOME | End: 2024-12-30
Payer: COMMERCIAL

## 2024-12-30 PROCEDURE — 94625 PHY/QHP OP PULM RHB W/O MNTR: CPT

## 2025-01-03 ENCOUNTER — HOSPITAL ENCOUNTER (OUTPATIENT)
Dept: CARDIAC REHAB | Age: 65
Setting detail: THERAPIES SERIES
End: 2025-01-03
Payer: COMMERCIAL

## 2025-01-03 ENCOUNTER — OFFICE VISIT (OUTPATIENT)
Dept: PULMONOLOGY | Age: 65
End: 2025-01-03

## 2025-01-03 VITALS
HEART RATE: 88 BPM | OXYGEN SATURATION: 93 % | BODY MASS INDEX: 28.2 KG/M2 | DIASTOLIC BLOOD PRESSURE: 88 MMHG | HEIGHT: 73 IN | SYSTOLIC BLOOD PRESSURE: 124 MMHG | WEIGHT: 212.8 LBS

## 2025-01-03 DIAGNOSIS — Z23 NEED FOR PNEUMOCOCCAL VACCINATION: ICD-10-CM

## 2025-01-03 DIAGNOSIS — Z23 NEED FOR PNEUMOCOCCAL VACCINATION: Primary | ICD-10-CM

## 2025-01-03 DIAGNOSIS — Z87.891 FORMER SMOKER: ICD-10-CM

## 2025-01-03 DIAGNOSIS — Z87.09 HISTORY OF PNEUMOTHORAX: ICD-10-CM

## 2025-01-03 DIAGNOSIS — J44.9 COPD, SEVERE (HCC): Primary | ICD-10-CM

## 2025-01-03 ASSESSMENT — ENCOUNTER SYMPTOMS
SORE THROAT: 0
VOICE CHANGE: 0
STRIDOR: 0
BACK PAIN: 0
APNEA: 0
CONSTIPATION: 0
SHORTNESS OF BREATH: 1
SINUS PRESSURE: 0
CHOKING: 0
DIARRHEA: 0
COUGH: 0
CHEST TIGHTNESS: 0
ABDOMINAL PAIN: 0
RHINORRHEA: 0
COLOR CHANGE: 0
ABDOMINAL DISTENTION: 0
VOMITING: 0
WHEEZING: 0
BLOOD IN STOOL: 0

## 2025-01-03 NOTE — PROGRESS NOTES
gastritis     Hyperlipidemia     Hypertension     Pancreatitis     peripheral neuropathy     both legs    Seizures (HCC)     spinal stenosis     spinal stenosis, DDD     Past Surgical History:   Procedure Laterality Date    ANKLE SURGERY Right 07/25/2013    RIGHT ANKLE ARTHROTOMY WITH EXPLORATION, REMOVAL OF LOOSE    BACK SURGERY      TOTAL OF 6 SPINE SURGERIES    CARPAL TUNNEL RELEASE Left     CERVICAL SPINE SURGERY      COLONOSCOPY      FOOT SURGERY      bone spur removed right foot    KNEE ARTHROPLASTY Left     LUMBAR FUSION N/A 4/10/2023    LUMBAR 2 - LUMBAR 4 EXTREME LATERAL INTERBODY FUSION WITH LUMBAR 2 - PELVIS POSTERIOR FIXATION performed by Aaron Blackburn MD at [facility] OR    LUMBAR FUSION N/A 4/10/2023    . performed by Aaron Blackburn MD at [facility] OR    NERVE SURGERY      ulnar, bilat.    REVISION TOTAL KNEE ARTHROPLASTY Left 12/17/2019    LEFT TOTAL KNEE DEBRIDEMENT AND POLYETHYLENE EXCHANGE performed by Robert Tavarez MD at [facility] OR    ROTATOR CUFF REPAIR Bilateral     SHOULDER ARTHROSCOPY Right     TOTAL KNEE ARTHROPLASTY Left 02/25/2020    INCISION AND DRAINAGE, CULTURE AND CLOSURE LEFT TOTAL KNEE ARTHROPLASTY WOUND,  POLY EXCHANGE performed by Robert Tavarez MD at [facility] OR     Family History   Problem Relation Age of Onset    Arthritis Mother     Stroke Father     Diabetes Maternal Aunt     Diabetes Maternal Uncle     Diabetes Maternal Grandfather        Review of Systems:  Review of Systems   Constitutional:  Positive for fatigue. Negative for activity change, appetite change, fever and unexpected weight change.   HENT:  Negative for congestion, ear discharge, ear pain, postnasal drip, rhinorrhea, sinus pressure, sneezing, sore throat, tinnitus and voice change.    Respiratory:  Positive for shortness of breath. Negative for apnea, cough, choking, chest tightness, wheezing and stridor.    Cardiovascular:  Negative for chest pain, palpitations and leg swelling.   Gastrointestinal:

## 2025-01-06 ENCOUNTER — HOSPITAL ENCOUNTER (OUTPATIENT)
Dept: CARDIAC REHAB | Age: 65
Setting detail: THERAPIES SERIES
End: 2025-01-06
Payer: COMMERCIAL

## 2025-01-08 ENCOUNTER — HOSPITAL ENCOUNTER (OUTPATIENT)
Dept: CARDIAC REHAB | Age: 65
Setting detail: THERAPIES SERIES
End: 2025-01-08
Payer: COMMERCIAL

## 2025-01-10 ENCOUNTER — HOSPITAL ENCOUNTER (OUTPATIENT)
Dept: CARDIAC REHAB | Age: 65
Setting detail: THERAPIES SERIES
End: 2025-01-10
Payer: COMMERCIAL

## 2025-01-13 ENCOUNTER — HOSPITAL ENCOUNTER (OUTPATIENT)
Dept: CARDIAC REHAB | Age: 65
Setting detail: THERAPIES SERIES
Discharge: HOME | End: 2025-01-13
Payer: COMMERCIAL

## 2025-01-13 PROCEDURE — 94625 PHY/QHP OP PULM RHB W/O MNTR: CPT

## 2025-01-15 ENCOUNTER — HOSPITAL ENCOUNTER (OUTPATIENT)
Dept: CARDIAC REHAB | Age: 65
Setting detail: THERAPIES SERIES
Discharge: HOME | End: 2025-01-15
Payer: COMMERCIAL

## 2025-01-15 PROCEDURE — 94625 PHY/QHP OP PULM RHB W/O MNTR: CPT

## 2025-01-17 ENCOUNTER — HOSPITAL ENCOUNTER (OUTPATIENT)
Dept: CARDIAC REHAB | Age: 65
Setting detail: THERAPIES SERIES
Discharge: HOME | End: 2025-01-17
Payer: COMMERCIAL

## 2025-01-17 PROCEDURE — 94625 PHY/QHP OP PULM RHB W/O MNTR: CPT

## 2025-01-20 ENCOUNTER — HOSPITAL ENCOUNTER (OUTPATIENT)
Dept: CARDIAC REHAB | Age: 65
Setting detail: THERAPIES SERIES
Discharge: HOME | End: 2025-01-20
Payer: COMMERCIAL

## 2025-01-20 PROCEDURE — 94625 PHY/QHP OP PULM RHB W/O MNTR: CPT

## 2025-01-22 ENCOUNTER — HOSPITAL ENCOUNTER (OUTPATIENT)
Dept: CARDIAC REHAB | Age: 65
Setting detail: THERAPIES SERIES
Discharge: HOME | End: 2025-01-22
Payer: COMMERCIAL

## 2025-01-22 PROCEDURE — 94625 PHY/QHP OP PULM RHB W/O MNTR: CPT

## 2025-01-24 ENCOUNTER — HOSPITAL ENCOUNTER (OUTPATIENT)
Dept: CARDIAC REHAB | Age: 65
Setting detail: THERAPIES SERIES
Discharge: HOME | End: 2025-01-24
Payer: COMMERCIAL

## 2025-01-24 PROCEDURE — 94625 PHY/QHP OP PULM RHB W/O MNTR: CPT

## 2025-01-27 ENCOUNTER — HOSPITAL ENCOUNTER (OUTPATIENT)
Dept: CARDIAC REHAB | Age: 65
Setting detail: THERAPIES SERIES
Discharge: HOME | End: 2025-01-27
Payer: COMMERCIAL

## 2025-01-27 PROCEDURE — 94626 PHY/QHP OP PULM RHB W/MNTR: CPT

## 2025-01-27 PROCEDURE — 94625 PHY/QHP OP PULM RHB W/O MNTR: CPT

## 2025-01-29 ENCOUNTER — HOSPITAL ENCOUNTER (OUTPATIENT)
Dept: CARDIAC REHAB | Age: 65
Setting detail: THERAPIES SERIES
Discharge: HOME | End: 2025-01-29
Payer: COMMERCIAL

## 2025-01-29 ENCOUNTER — TELEPHONE (OUTPATIENT)
Dept: CARDIAC REHAB | Age: 65
End: 2025-01-29

## 2025-01-31 ENCOUNTER — HOSPITAL ENCOUNTER (OUTPATIENT)
Dept: CARDIAC REHAB | Age: 65
Setting detail: THERAPIES SERIES
Discharge: HOME | End: 2025-01-31
Payer: COMMERCIAL

## 2025-01-31 NOTE — PROGRESS NOTES
Pt called and still having problems with SOB. Pt saw MD and now on Z pack. Pt plans on returning to pulmonary rehabilitation on Monday 2/3/25.

## 2025-02-03 ENCOUNTER — TELEPHONE (OUTPATIENT)
Dept: CARDIAC REHAB | Age: 65
End: 2025-02-03

## 2025-02-03 ENCOUNTER — HOSPITAL ENCOUNTER (OUTPATIENT)
Dept: CARDIAC REHAB | Age: 65
Setting detail: THERAPIES SERIES
End: 2025-02-03
Payer: COMMERCIAL

## 2025-02-03 DIAGNOSIS — J44.9 COPD, SEVERE (HCC): Primary | ICD-10-CM

## 2025-02-03 NOTE — TELEPHONE ENCOUNTER
Patient called and is feeling some better, but is still has a mucous and is unable to attend pulmonary rehab today.

## 2025-02-05 ENCOUNTER — HOSPITAL ENCOUNTER (OUTPATIENT)
Dept: CARDIAC REHAB | Age: 65
Setting detail: THERAPIES SERIES
Discharge: HOME | End: 2025-02-05

## 2025-02-05 NOTE — PROGRESS NOTES
Pt left message regarding not attending pulmonary rehab today, but is feeling better. Pt stated that he will be seeing his MD before he returns to exercise.

## 2025-02-07 ENCOUNTER — HOSPITAL ENCOUNTER (OUTPATIENT)
Dept: CARDIAC REHAB | Age: 65
Setting detail: THERAPIES SERIES
Discharge: HOME | End: 2025-02-07

## 2025-02-07 ENCOUNTER — TELEPHONE (OUTPATIENT)
Dept: CARDIAC REHAB | Age: 65
End: 2025-02-07

## 2025-02-07 NOTE — TELEPHONE ENCOUNTER
Pt. Called and still not feeling well - seen by PMD yesterday.  Will not be able to attend Pulmonary rehab today

## 2025-02-10 ENCOUNTER — HOSPITAL ENCOUNTER (OUTPATIENT)
Dept: CARDIAC REHAB | Age: 65
Setting detail: THERAPIES SERIES
End: 2025-02-10
Payer: COMMERCIAL

## 2025-02-10 ENCOUNTER — TELEPHONE (OUTPATIENT)
Dept: CARDIAC REHAB | Age: 65
End: 2025-02-10

## 2025-02-10 NOTE — TELEPHONE ENCOUNTER
Patient called and stated that he is unable to attend Pulmonary Rehab today or Wednesday 2/12/2025. He stated that he is starting to feel better and that he has 3 days left on his prednisone and antibiotic. He hopes to return on Friday 2/14/2025.

## 2025-02-12 ENCOUNTER — APPOINTMENT (OUTPATIENT)
Dept: CARDIAC REHAB | Age: 65
End: 2025-02-12
Payer: COMMERCIAL

## 2025-02-14 ENCOUNTER — HOSPITAL ENCOUNTER (OUTPATIENT)
Dept: CARDIAC REHAB | Age: 65
Setting detail: THERAPIES SERIES
Discharge: HOME | End: 2025-02-14
Payer: COMMERCIAL

## 2025-02-14 PROCEDURE — 94625 PHY/QHP OP PULM RHB W/O MNTR: CPT

## 2025-02-17 ENCOUNTER — HOSPITAL ENCOUNTER (OUTPATIENT)
Dept: CT IMAGING | Age: 65
Discharge: HOME | End: 2025-02-17
Attending: INTERNAL MEDICINE
Payer: COMMERCIAL

## 2025-02-17 ENCOUNTER — HOSPITAL ENCOUNTER (OUTPATIENT)
Dept: PULMONOLOGY | Age: 65
Discharge: HOME | End: 2025-02-17
Attending: INTERNAL MEDICINE
Payer: COMMERCIAL

## 2025-02-17 ENCOUNTER — HOSPITAL ENCOUNTER (OUTPATIENT)
Dept: CARDIAC REHAB | Age: 65
Setting detail: THERAPIES SERIES
Discharge: HOME | End: 2025-02-17
Payer: COMMERCIAL

## 2025-02-17 DIAGNOSIS — J44.9 COPD, SEVERE (HCC): ICD-10-CM

## 2025-02-17 LAB
DLCO %PRED: 54 %
DLCO PRED: NORMAL
DLCO/VA %PRED: NORMAL
DLCO/VA PRED: NORMAL
DLCO/VA: NORMAL
DLCO: NORMAL
EXPIRATORY TIME-POST: NORMAL
EXPIRATORY TIME: NORMAL
FEF 25-75 %CHNG: NORMAL
FEF 25-75 POST %PRED: NORMAL
FEF 25-75% %PRED-PRE: NORMAL
FEF 25-75% PRED: NORMAL
FEF 25-75-POST: NORMAL
FEF 25-75-PRE: NORMAL
FEV1 %PRED-POST: 44 %
FEV1 %PRED-PRE: 39 %
FEV1 PRED: NORMAL
FEV1-POST: NORMAL
FEV1-PRE: NORMAL
FEV1/FVC %PRED-POST: NORMAL
FEV1/FVC %PRED-PRE: NORMAL
FEV1/FVC PRED: NORMAL
FEV1/FVC-POST: 73 %
FEV1/FVC-PRE: 71 %
FVC %PRED-POST: NORMAL
FVC %PRED-PRE: NORMAL
FVC PRED: NORMAL
FVC-POST: NORMAL
FVC-PRE: NORMAL
GAW %PRED: NORMAL
GAW PRED: NORMAL
GAW: NORMAL
IC PRE %PRED: NORMAL
IC PRED: NORMAL
IC: NORMAL
MEP: NORMAL
MIP: NORMAL
MVV %PRED-PRE: NORMAL
MVV PRED: NORMAL
MVV-PRE: NORMAL
PEF %PRED-POST: NORMAL
PEF %PRED-PRE: NORMAL
PEF PRED: NORMAL
PEF%CHNG: NORMAL
PEF-POST: NORMAL
PEF-PRE: NORMAL
RAW %PRED: NORMAL
RAW PRED: NORMAL
RAW: NORMAL
RV PRE %PRED: NORMAL
RV PRED: NORMAL
RV: NORMAL
SVC %PRED: NORMAL
SVC PRED: NORMAL
SVC: NORMAL
TLC PRE %PRED: 61 %
TLC PRED: NORMAL
TLC: NORMAL
VA %PRED: NORMAL
VA PRED: NORMAL
VA: NORMAL
VTG %PRED: NORMAL
VTG PRED: NORMAL
VTG: NORMAL

## 2025-02-17 PROCEDURE — 94625 PHY/QHP OP PULM RHB W/O MNTR: CPT

## 2025-02-17 PROCEDURE — 6370000000 HC RX 637 (ALT 250 FOR IP): Performed by: INTERNAL MEDICINE

## 2025-02-17 PROCEDURE — 94729 DIFFUSING CAPACITY: CPT

## 2025-02-17 PROCEDURE — 94760 N-INVAS EAR/PLS OXIMETRY 1: CPT

## 2025-02-17 PROCEDURE — 71250 CT THORAX DX C-: CPT

## 2025-02-17 PROCEDURE — 94726 PLETHYSMOGRAPHY LUNG VOLUMES: CPT

## 2025-02-17 PROCEDURE — 94060 EVALUATION OF WHEEZING: CPT

## 2025-02-17 RX ORDER — ALBUTEROL SULFATE 90 UG/1
4 INHALANT RESPIRATORY (INHALATION) ONCE
Status: COMPLETED | OUTPATIENT
Start: 2025-02-17 | End: 2025-02-17

## 2025-02-17 RX ADMIN — Medication 4 PUFF: at 14:11

## 2025-02-17 ASSESSMENT — PULMONARY FUNCTION TESTS
FEV1/FVC_PRE: 71
FEV1_PERCENT_PREDICTED_POST: 44
FEV1/FVC_POST: 73
FEV1_PERCENT_PREDICTED_PRE: 39

## 2025-02-18 NOTE — PROCEDURES
Pulmonary Function Testing      Patient name:  Serge Austin      Unit #:   [id number]   Date of test: 2/17/2020  Date of interpretation:   2/18/2025    Mr. Serge Austin is a 64 y.o. year-old.  The spirometry data were acceptable and reproducible.     Spirometry:  Flow volume loops were obstructed. The FEV-1/FVC ratio was decreased . The  post-bronchodilator FEV-1 was 1.58 liters (44% of predicted) (Z score: -3.30), which was moderately decreased. The FVC was 2.8 liters (60% of predicted) (Z score: -2.61), which was decreased. Response to inhaled bronchodilators (albuterol) was not significant.    Lung volumes:  Lung volumes were tested by plethysmography. The total lung capacity was 4.71 liters (61% of predicted), which was decreased. The residual volume was 1.69 liters (66% of predicted), which was decreased. The ratio of residual volume to total lung capacity (RV/TLC) was 104, which was normal.     Diffusion capacity was found to be (54% of predicted) which was decreased.      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      Winter Cosme MD  [facility]   [address], [address], [place] [address]    Z-scores are recommended over fixed percent predicted thresholds to classify the severity of airflow obstruction.  Using Z-scores can reduce age and hide by, and the recommended threshold correlate with mortality risk.  The recommended Z-score threshold for different severities of airflow obstruction are:    Mild: -1.645 to -2.5  Moderate: -2.5 to -4.0  Severe:> -4.0

## 2025-02-19 ENCOUNTER — HOSPITAL ENCOUNTER (OUTPATIENT)
Dept: CARDIAC REHAB | Age: 65
Setting detail: THERAPIES SERIES
Discharge: HOME | End: 2025-02-19
Payer: COMMERCIAL

## 2025-02-19 PROCEDURE — 94625 PHY/QHP OP PULM RHB W/O MNTR: CPT

## 2025-02-21 ENCOUNTER — HOSPITAL ENCOUNTER (OUTPATIENT)
Dept: CARDIAC REHAB | Age: 65
Setting detail: THERAPIES SERIES
Discharge: HOME | End: 2025-02-21
Payer: COMMERCIAL

## 2025-02-21 PROCEDURE — 94625 PHY/QHP OP PULM RHB W/O MNTR: CPT

## 2025-02-24 ENCOUNTER — HOSPITAL ENCOUNTER (OUTPATIENT)
Dept: CARDIAC REHAB | Age: 65
Setting detail: THERAPIES SERIES
Discharge: HOME | End: 2025-02-24
Payer: COMMERCIAL

## 2025-02-24 PROCEDURE — 94625 PHY/QHP OP PULM RHB W/O MNTR: CPT

## 2025-02-25 ENCOUNTER — HOSPITAL ENCOUNTER (OUTPATIENT)
Age: 65
Discharge: HOME | End: 2025-02-25
Payer: COMMERCIAL

## 2025-02-25 ENCOUNTER — OFFICE VISIT (OUTPATIENT)
Dept: PULMONOLOGY | Age: 65
End: 2025-02-25
Payer: COMMERCIAL

## 2025-02-25 VITALS
DIASTOLIC BLOOD PRESSURE: 62 MMHG | OXYGEN SATURATION: 98 % | BODY MASS INDEX: 29.16 KG/M2 | SYSTOLIC BLOOD PRESSURE: 118 MMHG | HEART RATE: 73 BPM | WEIGHT: 220 LBS | HEIGHT: 73 IN

## 2025-02-25 DIAGNOSIS — J44.9 COPD, SEVERE (HCC): Primary | ICD-10-CM

## 2025-02-25 DIAGNOSIS — Z87.09 HX OF PNEUMOTHORAX: ICD-10-CM

## 2025-02-25 DIAGNOSIS — J44.9 COPD, SEVERE (HCC): ICD-10-CM

## 2025-02-25 DIAGNOSIS — J43.9 CHRONIC BULLOUS EMPHYSEMA (HCC): ICD-10-CM

## 2025-02-25 PROCEDURE — 3074F SYST BP LT 130 MM HG: CPT | Performed by: INTERNAL MEDICINE

## 2025-02-25 PROCEDURE — 1036F TOBACCO NON-USER: CPT | Performed by: INTERNAL MEDICINE

## 2025-02-25 PROCEDURE — 99215 OFFICE O/P EST HI 40 MIN: CPT | Performed by: INTERNAL MEDICINE

## 2025-02-25 PROCEDURE — 1123F ACP DISCUSS/DSCN MKR DOCD: CPT | Performed by: INTERNAL MEDICINE

## 2025-02-25 PROCEDURE — G8427 DOCREV CUR MEDS BY ELIG CLIN: HCPCS | Performed by: INTERNAL MEDICINE

## 2025-02-25 PROCEDURE — 82103 ALPHA-1-ANTITRYPSIN TOTAL: CPT

## 2025-02-25 PROCEDURE — 82104 ALPHA-1-ANTITRYPSIN PHENO: CPT

## 2025-02-25 PROCEDURE — G8419 CALC BMI OUT NRM PARAM NOF/U: HCPCS | Performed by: INTERNAL MEDICINE

## 2025-02-25 PROCEDURE — G2211 COMPLEX E/M VISIT ADD ON: HCPCS | Performed by: INTERNAL MEDICINE

## 2025-02-25 PROCEDURE — 3023F SPIROM DOC REV: CPT | Performed by: INTERNAL MEDICINE

## 2025-02-25 PROCEDURE — 3017F COLORECTAL CA SCREEN DOC REV: CPT | Performed by: INTERNAL MEDICINE

## 2025-02-25 PROCEDURE — 36415 COLL VENOUS BLD VENIPUNCTURE: CPT

## 2025-02-25 PROCEDURE — 3078F DIAST BP <80 MM HG: CPT | Performed by: INTERNAL MEDICINE

## 2025-02-25 ASSESSMENT — ENCOUNTER SYMPTOMS
ABDOMINAL PAIN: 0
STRIDOR: 0
RHINORRHEA: 0
CONSTIPATION: 0
DIARRHEA: 0
BLOOD IN STOOL: 0
ABDOMINAL DISTENTION: 0
CHEST TIGHTNESS: 0
WHEEZING: 0
SHORTNESS OF BREATH: 1
COUGH: 0
SINUS PRESSURE: 0
COLOR CHANGE: 0
VOICE CHANGE: 0
SORE THROAT: 0
APNEA: 0
VOMITING: 0
BACK PAIN: 0
CHOKING: 0

## 2025-02-25 NOTE — PROGRESS NOTES
leak status post right thoracoscopic mechanical pleurodesis at  in January 2024.  I suspect patient's restriction as noted on lung volume studies likely to be related to patient's mechanical pleurodesis.    CT chest without contrast performed on 2/17 revealed severe bullous changes at both apices with emphysema.    Complex case.  Discussed with SYLVESTER Garcia at .  Patient may not be an ideal candidate for [place] valve/endobronchial valve placement based on his lung volume studies.  He would discuss about this case at the pulmonary conference prior to any evaluation.  Patient might be a candidate for bullectomy if not a good candidate for [place] valve placement.    Return in about 3 months (around 5/25/2025).

## 2025-02-26 ENCOUNTER — HOSPITAL ENCOUNTER (OUTPATIENT)
Dept: CARDIAC REHAB | Age: 65
Setting detail: THERAPIES SERIES
Discharge: HOME | End: 2025-02-26
Payer: COMMERCIAL

## 2025-02-26 PROCEDURE — 94625 PHY/QHP OP PULM RHB W/O MNTR: CPT

## 2025-02-28 ENCOUNTER — HOSPITAL ENCOUNTER (OUTPATIENT)
Dept: CARDIAC REHAB | Age: 65
Setting detail: THERAPIES SERIES
Discharge: HOME | End: 2025-02-28
Payer: COMMERCIAL

## 2025-02-28 PROCEDURE — 94625 PHY/QHP OP PULM RHB W/O MNTR: CPT

## 2025-03-02 LAB
A1AT PHENOTYP SERPL-IMP: NORMAL
A1AT SERPL-MCNC: 143 MG/DL (ref 90–200)

## 2025-03-03 ENCOUNTER — HOSPITAL ENCOUNTER (OUTPATIENT)
Dept: CARDIAC REHAB | Age: 65
Setting detail: THERAPIES SERIES
Discharge: HOME | End: 2025-03-03
Payer: COMMERCIAL

## 2025-03-03 PROCEDURE — 94625 PHY/QHP OP PULM RHB W/O MNTR: CPT

## 2025-03-05 ENCOUNTER — HOSPITAL ENCOUNTER (OUTPATIENT)
Dept: CARDIAC REHAB | Age: 65
Setting detail: THERAPIES SERIES
Discharge: HOME | End: 2025-03-05
Payer: COMMERCIAL

## 2025-03-05 PROCEDURE — 94625 PHY/QHP OP PULM RHB W/O MNTR: CPT

## 2025-03-07 ENCOUNTER — HOSPITAL ENCOUNTER (OUTPATIENT)
Dept: CARDIAC REHAB | Age: 65
Setting detail: THERAPIES SERIES
End: 2025-03-07
Payer: COMMERCIAL

## 2025-03-10 ENCOUNTER — HOSPITAL ENCOUNTER (OUTPATIENT)
Dept: CARDIAC REHAB | Age: 65
Setting detail: THERAPIES SERIES
Discharge: HOME | End: 2025-03-10
Payer: COMMERCIAL

## 2025-03-10 PROCEDURE — 94625 PHY/QHP OP PULM RHB W/O MNTR: CPT

## 2025-03-12 ENCOUNTER — HOSPITAL ENCOUNTER (OUTPATIENT)
Dept: CARDIAC REHAB | Age: 65
Setting detail: THERAPIES SERIES
Discharge: HOME | End: 2025-03-12
Payer: COMMERCIAL

## 2025-03-12 PROCEDURE — 94625 PHY/QHP OP PULM RHB W/O MNTR: CPT

## 2025-03-14 ENCOUNTER — HOSPITAL ENCOUNTER (OUTPATIENT)
Dept: CARDIAC REHAB | Age: 65
Setting detail: THERAPIES SERIES
Discharge: HOME | End: 2025-03-14
Payer: COMMERCIAL

## 2025-03-14 PROCEDURE — 94625 PHY/QHP OP PULM RHB W/O MNTR: CPT

## 2025-03-17 ENCOUNTER — HOSPITAL ENCOUNTER (OUTPATIENT)
Dept: CARDIAC REHAB | Age: 65
Setting detail: THERAPIES SERIES
Discharge: HOME | End: 2025-03-17
Payer: COMMERCIAL

## 2025-03-17 PROCEDURE — 94625 PHY/QHP OP PULM RHB W/O MNTR: CPT

## 2025-03-19 ENCOUNTER — HOSPITAL ENCOUNTER (OUTPATIENT)
Dept: CARDIAC REHAB | Age: 65
Setting detail: THERAPIES SERIES
Discharge: HOME | End: 2025-03-19
Payer: COMMERCIAL

## 2025-03-19 PROCEDURE — 94625 PHY/QHP OP PULM RHB W/O MNTR: CPT

## 2025-03-20 ENCOUNTER — TELEPHONE (OUTPATIENT)
Dept: ADMINISTRATIVE | Age: 65
End: 2025-03-20

## 2025-03-20 NOTE — TELEPHONE ENCOUNTER
Submitted PA for Arnold Dobsb 100-62.5-25MCG/ACT aerosol powder   Via [facility] Key: [id number]  STATUS: Available without authorization.     Follow up done daily; if no decision with in three days we will refax.  If another three days goes by with no decision will call the insurance for status.

## 2025-03-21 ENCOUNTER — HOSPITAL ENCOUNTER (OUTPATIENT)
Dept: CARDIAC REHAB | Age: 65
Setting detail: THERAPIES SERIES
Discharge: HOME | End: 2025-03-21
Payer: COMMERCIAL

## 2025-03-21 PROCEDURE — 94625 PHY/QHP OP PULM RHB W/O MNTR: CPT

## 2025-03-24 ENCOUNTER — HOSPITAL ENCOUNTER (OUTPATIENT)
Dept: CARDIAC REHAB | Age: 65
Setting detail: THERAPIES SERIES
Discharge: HOME | End: 2025-03-24
Payer: COMMERCIAL

## 2025-03-24 PROCEDURE — 94625 PHY/QHP OP PULM RHB W/O MNTR: CPT

## 2025-03-26 ENCOUNTER — TELEPHONE (OUTPATIENT)
Dept: PULMONOLOGY | Age: 65
End: 2025-03-26

## 2025-03-26 NOTE — TELEPHONE ENCOUNTER
Radha with  interventional Pulmonology called requesting PFT reading and results for patient. She requested them from medical records and received fax, but stated they are ineligible. Attn to Audrey    PH:[phone]  FAX:[phone]

## 2025-05-23 RX ORDER — FLUTICASONE FUROATE, UMECLIDINIUM BROMIDE AND VILANTEROL TRIFENATATE 100; 62.5; 25 UG/1; UG/1; UG/1
POWDER RESPIRATORY (INHALATION)
Qty: 180 EACH | Refills: 3 | Status: SHIPPED | OUTPATIENT
Start: 2025-05-23

## 2025-06-03 ENCOUNTER — OFFICE VISIT (OUTPATIENT)
Dept: PULMONOLOGY | Age: 65
End: 2025-06-03
Payer: MEDICARE

## 2025-06-03 VITALS
HEART RATE: 77 BPM | HEIGHT: 73 IN | SYSTOLIC BLOOD PRESSURE: 122 MMHG | OXYGEN SATURATION: 96 % | DIASTOLIC BLOOD PRESSURE: 74 MMHG | BODY MASS INDEX: 29.42 KG/M2 | WEIGHT: 222 LBS

## 2025-06-03 DIAGNOSIS — Z87.09 HX OF PNEUMOTHORAX: ICD-10-CM

## 2025-06-03 DIAGNOSIS — J44.9 COPD, SEVERE (HCC): Primary | ICD-10-CM

## 2025-06-03 DIAGNOSIS — J43.9 BULLOUS EMPHYSEMA (HCC): ICD-10-CM

## 2025-06-03 PROCEDURE — 1123F ACP DISCUSS/DSCN MKR DOCD: CPT | Performed by: INTERNAL MEDICINE

## 2025-06-03 PROCEDURE — G2211 COMPLEX E/M VISIT ADD ON: HCPCS | Performed by: INTERNAL MEDICINE

## 2025-06-03 PROCEDURE — G8419 CALC BMI OUT NRM PARAM NOF/U: HCPCS | Performed by: INTERNAL MEDICINE

## 2025-06-03 PROCEDURE — 1090F PRES/ABSN URINE INCON ASSESS: CPT | Performed by: INTERNAL MEDICINE

## 2025-06-03 PROCEDURE — G8400 PT W/DXA NO RESULTS DOC: HCPCS | Performed by: INTERNAL MEDICINE

## 2025-06-03 PROCEDURE — 1036F TOBACCO NON-USER: CPT | Performed by: INTERNAL MEDICINE

## 2025-06-03 PROCEDURE — 3074F SYST BP LT 130 MM HG: CPT | Performed by: INTERNAL MEDICINE

## 2025-06-03 PROCEDURE — 3023F SPIROM DOC REV: CPT | Performed by: INTERNAL MEDICINE

## 2025-06-03 PROCEDURE — 3017F COLORECTAL CA SCREEN DOC REV: CPT | Performed by: INTERNAL MEDICINE

## 2025-06-03 PROCEDURE — 3078F DIAST BP <80 MM HG: CPT | Performed by: INTERNAL MEDICINE

## 2025-06-03 PROCEDURE — G8427 DOCREV CUR MEDS BY ELIG CLIN: HCPCS | Performed by: INTERNAL MEDICINE

## 2025-06-03 PROCEDURE — 99214 OFFICE O/P EST MOD 30 MIN: CPT | Performed by: INTERNAL MEDICINE

## 2025-06-03 RX ORDER — FLUTICASONE FUROATE, UMECLIDINIUM BROMIDE AND VILANTEROL TRIFENATATE 100; 62.5; 25 UG/1; UG/1; UG/1
1 POWDER RESPIRATORY (INHALATION) DAILY
Qty: 180 EACH | Refills: 3 | Status: SHIPPED | OUTPATIENT
Start: 2025-06-03

## 2025-06-03 RX ORDER — ALBUTEROL SULFATE 90 UG/1
2 INHALANT RESPIRATORY (INHALATION) 4 TIMES DAILY PRN
Qty: 54 G | Refills: 2 | Status: SHIPPED | OUTPATIENT
Start: 2025-06-03

## 2025-06-03 ASSESSMENT — ENCOUNTER SYMPTOMS
BLOOD IN STOOL: 0
RHINORRHEA: 0
SHORTNESS OF BREATH: 1
ABDOMINAL PAIN: 0
COUGH: 0
COLOR CHANGE: 0
DIARRHEA: 0
VOMITING: 0
WHEEZING: 0
SORE THROAT: 0
CHOKING: 0
BACK PAIN: 0
APNEA: 0
SINUS PRESSURE: 0
ABDOMINAL DISTENTION: 0
STRIDOR: 0
VOICE CHANGE: 0
CONSTIPATION: 0
CHEST TIGHTNESS: 0

## 2025-06-03 NOTE — PROGRESS NOTES
Serge Austin    YOB: 1960     Date of Service:  6/3/2025     Chief Complaint   Patient presents with    3 Month Follow-Up       HPI patient is here for follow-up for COPD.  Has BILL with activities of daily living-states that he continues to exercise with all equipment similar to pulmonary rehabilitation at home.    He was recently seen by interventional pulmonology at -turned down for BLVR treatment of emphysema of left lung.    Allergies   Allergen Reactions    Metformin Other (See Comments)     GI upset    Shellfish-Derived Products Hives    Simvastatin Other (See Comments)     GI upset     Outpatient Medications Marked as Taking for the 6/3/25 encounter (Office Visit) with Leandro Roman MD   Medication Sig Dispense Refill    albuterol sulfate HFA (VENTOLIN HFA) 108 (90 Base) MCG/ACT inhaler Inhale 2 puffs into the lungs 4 times daily as needed for Wheezing 54 g 2    fluticasone-umeclidin-vilant (TRELEGY ELLIPTA) 100-62.5-25 MCG/ACT AEPB inhaler Inhale 1 puff into the lungs daily 180 each 3    albuterol-ipratropium (COMBIVENT RESPIMAT)  MCG/ACT AERS inhaler Inhale 1 puff into the lungs every 6 hours as needed for Wheezing 1 each 3    methotrexate (RHEUMATREX) 2.5 MG chemo tablet Take 1 tablet by mouth once a week Take 8 tablets by mouth once a week-4 tablets in the morning and 4 tablets in the evening.      baclofen (LIORESAL) 10 MG tablet Take 1 tablet by mouth 2 times daily as needed (unknown)      levETIRAcetam (KEPPRA) 750 MG tablet Take 1 tablet by mouth 2 times daily      predniSONE (DELTASONE) 2.5 MG tablet Take 1 tablet by mouth daily as needed (for arthriis flares)      loratadine-pseudoephedrine (CLARITIN-D 24 HOUR)  MG per extended release tablet Take 1 tablet by mouth daily      Multiple Vitamin (MULTIVITAMIN ADULT PO) Take by mouth daily      omeprazole (PRILOSEC) 20 MG delayed release capsule Take 2 capsules by mouth daily      inFLIXimab (REMICADE) 100 MG

## (undated) DEVICE — COVER,MAYO STAND,XL,STERILE: Brand: MEDLINE

## (undated) DEVICE — SURE SET-DOUBLE BASIN-LF: Brand: MEDLINE INDUSTRIES, INC.

## (undated) DEVICE — SPONGE,LAP,18"X18",DLX,XR,ST,5/PK,40/PK: Brand: MEDLINE

## (undated) DEVICE — SUTURE VCRL SZ 2-0 L18IN ABSRB UD CT-1 L36MM 1/2 CIR J839D

## (undated) DEVICE — PRE OP PACK: Brand: MEDLINE INDUSTRIES, INC.

## (undated) DEVICE — MONOPOLAR STIMULATING PRB STRL SGL USE ONLY

## (undated) DEVICE — COVER LT HNDL BLU PLAS

## (undated) DEVICE — COUNTER NDL 40 COUNT HLD 70 NUM FOAM BLK SGL MAG W BLDE REMV

## (undated) DEVICE — GARMENT,MEDLINE,DVT,INT,CALF,MED, GEN2: Brand: MEDLINE

## (undated) DEVICE — PROBE 8225101 5PK STD PRASS FL TIP ROHS

## (undated) DEVICE — MARKER SURG SKIN UTIL BLK REG TIP NONSMEARING W/ 6IN RUL

## (undated) DEVICE — SYRINGE IRRIG 60ML SFT PLIABLE BLB EZ TO GRP 1 HND USE W/

## (undated) DEVICE — PACK PROCEDURE SURG TOTAL KNEE

## (undated) DEVICE — GOWN,SIRUS,POLYRNF,BRTHSLV,XL,30/CS: Brand: MEDLINE

## (undated) DEVICE — JEWISH HOSPITAL TURNOVER KIT: Brand: MEDLINE INDUSTRIES, INC.

## (undated) DEVICE — 3M™ STERI-DRAPE™ U-DRAPE 1015: Brand: STERI-DRAPE™

## (undated) DEVICE — SUTURE STRATAFIX SYMMETRIC SZ 1 L18IN ABSRB VLT CT1 L36CM SXPP1A404

## (undated) DEVICE — TOWEL,STOP FLAG GOLD N-W: Brand: MEDLINE

## (undated) DEVICE — SPONGE,NEURO,0.5"X3",XR,STRL,LF,10/PK: Brand: MEDLINE

## (undated) DEVICE — 3M™ TEGADERM™ TRANSPARENT FILM DRESSING FRAME STYLE, 1627, 4 IN X 10 IN (10 CM X 25 CM), 20/CT 4CT/CASE: Brand: 3M™ TEGADERM™

## (undated) DEVICE — COAXIAL HIGH FLOW TIP WITH SOFT SHIELD

## (undated) DEVICE — APPLICATOR PREP 26ML 0.7% IOD POVACRYLEX 74% ISO ALC ST

## (undated) DEVICE — Z CONVERTED USE 2271043 CONTAINER SPEC COLL 4OZ SCR ON LID PEEL PCH

## (undated) DEVICE — SOLUTION IV 1000ML 0.9% SOD CHL

## (undated) DEVICE — 3M™ TEGADERM™ CHG CHLORHEXIDINE GLUCONATE GEL PAD 1664, 25 EACH/CARTON, 4 CARTONS/CASE: Brand: 3M™ TEGADERM™

## (undated) DEVICE — SUTURE VCRL SZ 0 L18IN ABSRB UD L36MM CT-1 1/2 CIR J840D

## (undated) DEVICE — SOLUTION IV IRRIG WATER 1000ML POUR BRL 2F7114

## (undated) DEVICE — GLOVE SURG SZ 8 L12IN FNGR THK13MIL BRN LTX SYN POLYMER W

## (undated) DEVICE — BLANKET WRM W29.9XL79.1IN UP BODY FORC AIR MISTRAL-AIR

## (undated) DEVICE — SPLINT ORTH 22IN KNEE BASIC

## (undated) DEVICE — SUTURE VCRL SZ 2 L27IN ABSRB VLT L65MM TP-1 1/2 CIR J649G

## (undated) DEVICE — SSC BONE WAX: Brand: SSC BONE WAX

## (undated) DEVICE — ADHESIVE SKIN CLSR 0.7ML TOP DERMBND ADV

## (undated) DEVICE — ORTHO PRE OP PACK: Brand: MEDLINE INDUSTRIES, INC.

## (undated) DEVICE — CLIP MULTI-STAGE  STRL SINGLE-USE

## (undated) DEVICE — PROBE NEUROMONITORING FOR MINIMALLY INVASIVE SURG LAT

## (undated) DEVICE — PAD,NON-ADHERENT,3X8,STERILE,LF,1/PK: Brand: MEDLINE

## (undated) DEVICE — SPHERES FOR BRAINLAB

## (undated) DEVICE — C-ARMOR C-ARM EQUIPMENT COVERS CLEAR STERILE UNIVERSAL FIT 12 PER CASE: Brand: C-ARMOR

## (undated) DEVICE — E-Z CLEAN, NON-STICK, PTFE COATED, ELECTROSURGICAL BLADE ELECTRODE, 2.5 INCH (6.35 CM): Brand: EZ CLEAN

## (undated) DEVICE — BIPOLAR SEALER 23-112-1 AQM 6.0: Brand: AQUAMANTYS ®

## (undated) DEVICE — BATTERY SURG DRVR DISP FOR MATRIXPRO

## (undated) DEVICE — SYSTEM SKIN CLSR 22CM DERMBND PRINEO

## (undated) DEVICE — SUTURE MCRYL SZ 4-0 L27IN ABSRB UD L19MM PS-2 1/2 CIR PRIM Y426H

## (undated) DEVICE — LAMINECTOMY: Brand: MEDLINE INDUSTRIES, INC.

## (undated) DEVICE — PEEL-AWAY HOOD: Brand: FLYTE, SURGICOOL

## (undated) DEVICE — KIT EVAC 400CC DIA1/8IN H PAT 12.5IN 3 SPR RND SHP PVC DRN

## (undated) DEVICE — DRESSING FOAM W4XL12IN SIL RECT ADH WTRPRF FLM BK W/ BORD

## (undated) DEVICE — COVER LT HNDL CAM BLU DISP W/ SURG CTRL

## (undated) DEVICE — SUTURE STRATAFIX SYMMETRIC PDS + SZ 1 L18IN ABSRB VLT L48MM SXPP1A400

## (undated) DEVICE — GLOVE SURG SZ 65 L12IN FNGR THK79MIL GRN LTX FREE

## (undated) DEVICE — STAPLER SKIN H3.9MM WIRE DIA0.58MM CRWN 6.9MM 35 STPL ROT

## (undated) DEVICE — UNDERGLOVE SURG SZ 8 BLU LTX FREE SYN POLYISOPRENE POLYMER

## (undated) DEVICE — TOOL MR8-14MH30 MR8 14CM MATCH 3MM: Brand: MIDAS REX MR8

## (undated) DEVICE — GLOVE SURG SZ 6 L12IN FNGR THK75MIL WHT LTX POLYMER BEAD

## (undated) DEVICE — Z INACTIVE USE 2660664 SOLUTION IRRIG 3000ML 0.9% SOD CHL USP UROMATIC PLAS CONT

## (undated) DEVICE — SUTURE ETHBND EXCEL SZ 2 L30IN NONABSORBABLE GRN L40MM V-37 MX69G

## (undated) DEVICE — GOWN,SIRUS,POLYRNF,BRTHSLV,LG,30/CS: Brand: MEDLINE

## (undated) DEVICE — GLOVE SURG SZ 65 CRM LTX FREE POLYISOPRENE POLYMER BEAD ANTI

## (undated) DEVICE — PLATE ES AD W 9FT CRD 2

## (undated) DEVICE — ZIMMER® STERILE DISPOSABLE TOURNIQUET CUFF WITH PLC, DUAL PORT, SINGLE BLADDER, 30 IN. (76 CM)

## (undated) DEVICE — 3M™ TEGADERM™ TRANSPARENT FILM DRESSING FRAME STYLE, 1626W, 4 IN X 4-3/4 IN (10 CM X 12 CM), 50/CT 4CT/CASE: Brand: 3M™ TEGADERM™

## (undated) DEVICE — 3M™ STERI-DRAPE™ INSTRUMENT POUCH 1018: Brand: STERI-DRAPE™

## (undated) DEVICE — NEEDLE SUT SZ 4 MAYO CATGUT 1/2 CIR TAPR PNT DISP

## (undated) DEVICE — HOLDER SCALP PLAS G STD

## (undated) DEVICE — GLOVE SURG SZ 75 L12IN FNGR THK94MIL TRNSLUC YEL LTX

## (undated) DEVICE — PROTECTOR ULN NRV PUR FOAM HK LOOP STRP ANATOMICALLY

## (undated) DEVICE — HANDPIECE SUCTION TUBING INTERPULSE 10FT

## (undated) DEVICE — Device

## (undated) DEVICE — DRAPE MICSCP W54XL150IN W/ 4 BINOC GLS LENS LEICA

## (undated) DEVICE — 450 ML BOTTLE OF 0.05% CHLORHEXIDINE GLUCONATE IN 99.95% STERILE WATER FOR IRRIGATION, USP AND APPLICATOR.: Brand: IRRISEPT ANTIMICROBIAL WOUND LAVAGE